# Patient Record
Sex: MALE | Race: BLACK OR AFRICAN AMERICAN | Employment: FULL TIME | URBAN - METROPOLITAN AREA
[De-identification: names, ages, dates, MRNs, and addresses within clinical notes are randomized per-mention and may not be internally consistent; named-entity substitution may affect disease eponyms.]

---

## 2015-03-31 LAB — HBA1C MFR BLD: 8.1 % (ref 0–5.7)

## 2015-11-24 LAB
CHOLEST SERPL-MCNC: 213 MG/DL
CREAT SERPL-MCNC: 1.12 MG/DL (ref 0.7–1.3)
GFR SERPL CREATININE-BSD FRML MDRD: >60 ML/MIN/1.73M2
GLUCOSE SERPL-MCNC: 182 MG/DL (ref 74–106)
HBA1C MFR BLD: 8.4 % (ref 0–5.7)
HDLC SERPL-MCNC: 52 MG/DL
LDLC SERPL CALC-MCNC: 115 MG/DL
POTASSIUM SERPL-SCNC: 3.6 MMOL/L (ref 3.5–5.1)
TRIGL SERPL-MCNC: 228 MG/DL

## 2016-06-28 LAB — HBA1C MFR BLD: 8 % (ref 0–5.7)

## 2016-08-16 LAB — HBA1C MFR BLD: 7.2 % (ref 0–5.7)

## 2016-11-17 LAB
CHOLEST SERPL-MCNC: 252 MG/DL
CREAT SERPL-MCNC: 1.09 MG/DL (ref 0.7–1.3)
GFR SERPL CREATININE-BSD FRML MDRD: >60 ML/MIN/1.73M2
GLUCOSE SERPL-MCNC: 147 MG/DL (ref 74–106)
HBA1C MFR BLD: 7.2 % (ref 0–5.7)
HDLC SERPL-MCNC: 81 MG/DL
HEP C HIM: NORMAL
LDLC SERPL CALC-MCNC: 150 MG/DL
POTASSIUM SERPL-SCNC: 4 MMOL/L (ref 3.5–5.1)
TRIGL SERPL-MCNC: 107 MG/DL

## 2017-02-27 ENCOUNTER — PRE VISIT (OUTPATIENT)
Dept: UROLOGY | Facility: CLINIC | Age: 62
End: 2017-02-27

## 2017-02-27 NOTE — TELEPHONE ENCOUNTER
"Spoke with patient had patient states difficulty with ejaculation and Dr Molina \"told him\" to take sudafed and it would help him and its not working. Patient also stated that he is coming in to discuss Viagra.   "

## 2017-03-02 ENCOUNTER — OFFICE VISIT (OUTPATIENT)
Dept: UROLOGY | Facility: CLINIC | Age: 62
End: 2017-03-02

## 2017-03-02 VITALS
HEART RATE: 104 BPM | BODY MASS INDEX: 21.5 KG/M2 | HEIGHT: 67 IN | DIASTOLIC BLOOD PRESSURE: 76 MMHG | SYSTOLIC BLOOD PRESSURE: 111 MMHG | WEIGHT: 137 LBS

## 2017-03-02 DIAGNOSIS — N52.9 ERECTILE DYSFUNCTION, UNSPECIFIED ERECTILE DYSFUNCTION TYPE: Primary | ICD-10-CM

## 2017-03-02 DIAGNOSIS — R68.82 DECREASED LIBIDO: ICD-10-CM

## 2017-03-02 DIAGNOSIS — C61 PC (PROSTATE CANCER) (H): ICD-10-CM

## 2017-03-02 RX ORDER — SILDENAFIL 100 MG/1
100 TABLET, FILM COATED ORAL DAILY PRN
Qty: 12 TABLET | Refills: 11 | Status: SHIPPED | OUTPATIENT
Start: 2017-03-02 | End: 2019-01-07

## 2017-03-02 RX ORDER — SILDENAFIL CITRATE 20 MG/1
20-40 TABLET ORAL DAILY PRN
Qty: 30 TABLET | Refills: 12 | Status: SHIPPED | OUTPATIENT
Start: 2017-03-02 | End: 2018-05-07

## 2017-03-02 ASSESSMENT — PAIN SCALES - GENERAL: PAINLEVEL: NO PAIN (0)

## 2017-03-02 NOTE — MR AVS SNAPSHOT
After Visit Summary   3/2/2017    August Ray    MRN: [id number]           Patient Information     Date Of Birth          1955        Visit Information        Provider Department      3/2/2017 10:40 AM Mao Molina MD [facility] Urology and [facility]        Today's Diagnoses     Erectile dysfunction, unspecified erectile dysfunction type    -  1    PC (prostate cancer) (H)        Decreased libido          Care Instructions    Follow up in 1 year with Dr Molina    It was a pleasure meeting with you today.  Thank you for allowing me and my team the privilege of caring for you today.  YOU are the reason we are here, and I truly hope we provided you with the excellent service you deserve.  Please let us know if there is anything else we can do for you so that we can be sure you are leaving completely satisfied with your care experience.                Follow-ups after your visit        Follow-up notes from your care team     Return in about 1 year (around 3/2/2018).      Your next 10 appointments already scheduled     Aug 29, 2017  8:30 AM CDT   Return Visit with Lillie Worley MD   Radiation Therapy Center ([facility])    [address] [place] [address], [address]  [facility] [address]   [phone]              Who to contact     Please call your clinic at [phone] to:    Ask questions about your health    Make or cancel appointments    Discuss your medicines    Learn about your test results    Speak to your doctor   If you have compliments or concerns about an experience at your clinic, or if you wish to file a complaint, please contact [facility] Patient Relations at [phone] or email us at [email or web address]         Additional Information About Your Visit        MyChart Information     Upplication is an electronic gateway that provides easy, online access to your medical records. With Upplication, you can request a  "clinic appointment, read your test results, renew a prescription or communicate with your care team.     To sign up for DEMANDITt visit the website at [email or web address]   You will be asked to enter the access code listed below, as well as some personal information. Please follow the directions to create your username and password.     Your access code is: [email or web address]  Expires: 2017  6:31 AM     Your access code will  in 90 days. If you need help or a new code, please contact your [facility] or call [phone] for assistance.        Care EveryWhere ID     This is your Care EveryWhere ID. This could be used by other organizations to access your [place] medical records  [id number]        Your Vitals Were     Pulse Height BMI (Body Mass Index)             104 1.702 m (5' 7\") 21.46 kg/m2          Blood Pressure from Last 3 Encounters:   17 111/76   10/06/16 99/67   16 109/60    Weight from Last 3 Encounters:   17 62.1 kg (137 lb)   10/06/16 62.1 kg (137 lb)   16 59.9 kg (132 lb)              Today, you had the following     No orders found for display         Today's Medication Changes          These changes are accurate as of: 3/2/17  1:35 PM.  If you have any questions, ask your nurse or doctor.               Start taking these medicines.        Dose/Directions    sildenafil 20 MG tablet   Commonly known as:  REVATIO/VIAGRA   Used for:  Erectile dysfunction, unspecified erectile dysfunction type   Started by:  Mao Molina MD        Dose:  20-40 mg   Take 1-2 tablets (20-40 mg) by mouth daily as needed May increase up to 100mg dose ( 5 tablets) if needed.  Use lowest effective dose.   Quantity:  30 tablet   Refills:  12         These medicines have changed or have updated prescriptions.        Dose/Directions    * VIAGRA PO   This may have changed:  Another medication with the same name was added. Make sure you understand how and " when to take each.   Changed by:  Lillie Worley MD        Take  by mouth as needed.   Refills:  0       * sildenafil 100 MG cap/tab   Commonly known as:  VIAGRA   This may have changed:  You were already taking a medication with the same name, and this prescription was added. Make sure you understand how and when to take each.   Used for:  Erectile dysfunction, unspecified erectile dysfunction type   Changed by:  Mao Molina MD        Dose:  100 mg   Take 1 tablet (100 mg) by mouth daily as needed for erectile dysfunction   Quantity:  12 tablet   Refills:  11       * Notice:  This list has 2 medication(s) that are the same as other medications prescribed for you. Read the directions carefully, and ask your doctor or other care provider to review them with you.         Where to get your medicines      Some of these will need a paper prescription and others can be bought over the counter.  Ask your nurse if you have questions.     Bring a paper prescription for each of these medications     sildenafil 100 MG cap/tab    sildenafil 20 MG tablet                Primary Care Provider Office Phone # Fax #    Jackson Gorman [phone]       [facility] [address] [address]        Thank you!     Thank you for choosing [facility] UROLOGY AND [facility] FOR PROSTATE AND UROLOGIC CANCERS  for your care. Our goal is always to provide you with excellent care. Hearing back from our patients is one way we can continue to improve our services. Please take a few minutes to complete the written survey that you may receive in the mail after your visit with us. Thank you!             Your Updated Medication List - Protect others around you: Learn how to safely use, store and throw away your medicines at www.disposemymeds.org.          This list is accurate as of: 3/2/17  1:35 PM.  Always use your most recent med list.                   Brand Name Dispense Instructions for use     acetaminophen-codeine 300-30 MG per tablet    TYLENOL #3     Reported on 3/2/2017       Adjustable Lancing Device Misc          Alcohol Prep 70 % Pads          allopurinol 150 MG Tabs half-tab    ZYLOPRIM     Take 150 mg by mouth daily.       amLODIPine 5 MG tablet    NORVASC         amoxicillin 875 MG tablet    AMOXIL     TK 1 T PO  BID TAT       aspirin 81 MG tablet      Take 1 tablet by mouth daily.       ASSURE COMFORT LANCETS 30G Misc          atorvastatin 40 MG tablet    LIPITOR     TK 1 T PO QD       azithromycin 250 MG tablet    ZITHROMAX         CARESENS CONTROL A Soln          CARESENS N GLUCOSE SYSTEM Gris          CARESENS N GLUCOSE TEST test strip   Generic drug:  blood glucose monitoring          ciprofloxacin 500 MG tablet    CIPRO     TK 1 T PO BID       CLARITIN PO      Take  by mouth as needed.       glipiZIDE 5 MG 24 hr tablet    GLUCOTROL XL     TK 1 T PO D IN THE MORNING WITH TIERNEY       glyBURIDE 5 MG tablet    DIABETA /MICRONASE     Take 5 mg by mouth 2 times daily.       HYDROcodone-acetaminophen 5-325 MG per tablet    NORCO     Reported on 3/2/2017       IBUPROFEN PO      Take  by mouth as needed.       indomethacin 50 MG capsule    INDOCIN         JARDIANCE 10 MG Tabs tablet   Generic drug:  empagliflozin      TK 1 T PO D       lisinopril-hydrochlorothiazide 20-25 MG per tablet    PRINZIDE/ZESTORETIC     Take 1 tablet by mouth daily.       metFORMIN 1000 MG tablet    GLUCOPHAGE     Take 1,000 mg by mouth 2 times daily (with meals) PT has been changed to a different medication - cannot remember name -       naproxen 500 MG tablet    NAPROSYN     TK 1 T PO BID       NIASPAN 500 MG CR tablet   Generic drug:  niacin      Take 1 tablet by mouth daily.       oxybutynin 10 MG 24 hr tablet    DITROPAN-XL     TK 1 T PO QD       penicillin V potassium 500 MG tablet    VEETID     TK 1 T PO QID       pravastatin 40 MG tablet    PRAVACHOL     TK ONE T PO D       * QC MENS DAILY MULTIVITAMIN PO      Take 1  tablet by mouth daily.       * ROGERS MULTI MEN PO      Take 1 tablet by mouth daily.       sildenafil 20 MG tablet    REVATIO/VIAGRA    30 tablet    Take 1-2 tablets (20-40 mg) by mouth daily as needed May increase up to 100mg dose ( 5 tablets) if needed.  Use lowest effective dose.       SIMVASTATIN PO          tamsulosin 0.4 MG capsule    FLOMAX     Take 1 capsule by mouth daily.       * testosterone 12.5 MG/ACT (1%) gel    ANDROGEL 1% PUMP    1 Package    Place 400 pumps (5,000 mg) onto the skin every morning       * testosterone 40.5 MG/2.5GM (1.62%) Gel    ANDROGEL    3 Month    Place 1 packet (40.5 mg) onto the skin daily       TYLENOL PO      Take  by mouth as needed.       * VIAGRA PO      Take  by mouth as needed.       * sildenafil 100 MG cap/tab    VIAGRA    12 tablet    Take 1 tablet (100 mg) by mouth daily as needed for erectile dysfunction       * Notice:  This list has 6 medication(s) that are the same as other medications prescribed for you. Read the directions carefully, and ask your doctor or other care provider to review them with you.

## 2017-03-02 NOTE — LETTER
"3/2/2017       RE: August Ray  [address] [place] [address]     Dear Colleague,    Thank you for referring your patient, August Ray, to the [facility] UROLOGY AND INST FOR PROSTATE AND UROLOGIC CANCERS at [facility]. Please see a copy of my visit note below.    Urology Clinic Note:     Mr. Ray is a 61 year old male with urologic history of prostate cancer Rose 6= 3+3 s/p  brachytherapy in 2007 who presents today for follow up for low libido. Patient has previously been on Androgel in 2013 which has since been discontinued. His most recent PSA 0.2. He states that his libido comes and goes but he does report good erections. He has tried Cialis in the past but without much improvement.     He reports that sometimes he can have normal intercourse without Viagra.  Medications do help, though.  He struggles with cost.      He complains of low libido and lack of ejaculation.  Testosterone was normal 10/2016.    /76 (BP Location: Right arm, Cuff Size: Adult Large)  Pulse 104  Ht 1.702 m (5' 7\")  Wt 62.1 kg (137 lb)  BMI 21.46 kg/m2   No acute distress   Non-labored breathing on RA     Labs:  Testosterone 378 10/2016   PSA 0.02 8/30/16    Lab Results   Component Value Date    PSA 0.02 08/30/2016    PSA 0.04 09/21/2015    PSA 0.06 08/14/2015    PSA 0.05 03/09/2015    PSA  03/06/2014     <0.07  PSA results are about 7% lower than our prior method due to a methodology change   on August 30, 2011.    PSA  08/23/2013     <0.07  PSA results are about 7% lower than our prior method due to a methodology change   on August 30, 2011.    PSA 0.09 04/26/2013    PSA 0.10 02/04/2013    PSA 0.07 07/16/2012    PSA 0.15 09/06/2011        A-  Prostate cancer  Decreased libido with normal testosterone.  Anejaculation    Plan:     - Will prescribe Viagra 50-100mg.  Discussed side effects and how to take.  - Sudafed could be tried but probably won't work for antegrade " ejaculation.  - prefer not do testosterone replacement therapy due to history of prostate cancer and normal testosterone level.  - return to clinic 1 year, sooner if needed.    15min visit, over 50% face to face in counseling/discussion of ED, CAP, T level.    Mao Molina MD  Urology Staff

## 2017-03-02 NOTE — PATIENT INSTRUCTIONS
Follow up in 1 year with Dr Molina    It was a pleasure meeting with you today.  Thank you for allowing me and my team the privilege of caring for you today.  YOU are the reason we are here, and I truly hope we provided you with the excellent service you deserve.  Please let us know if there is anything else we can do for you so that we can be sure you are leaving completely satisfied with your care experience.

## 2017-03-02 NOTE — PROGRESS NOTES
"Urology Clinic Note:     Mr. Ray is a 61 year old male with urologic history of prostate cancer Rose 6= 3+3 s/p  brachytherapy in 2007 who presents today for follow up for low libido. Patient has previously been on Androgel in 2013 which has since been discontinued. His most recent PSA 0.2. He states that his libido comes and goes but he does report good erections. He has tried Cialis in the past but without much improvement.     He reports that sometimes he can have normal intercourse without Viagra.  Medications do help, though.  He struggles with cost.      He complains of low libido and lack of ejaculation.  Testosterone was normal 10/2016.    /76 (BP Location: Right arm, Cuff Size: Adult Large)  Pulse 104  Ht 1.702 m (5' 7\")  Wt 62.1 kg (137 lb)  BMI 21.46 kg/m2   No acute distress   Non-labored breathing on RA     Labs:  Testosterone 378 10/2016   PSA 0.02 8/30/16    Lab Results   Component Value Date    PSA 0.02 08/30/2016    PSA 0.04 09/21/2015    PSA 0.06 08/14/2015    PSA 0.05 03/09/2015    PSA  03/06/2014     <0.07  PSA results are about 7% lower than our prior method due to a methodology change   on August 30, 2011.    PSA  08/23/2013     <0.07  PSA results are about 7% lower than our prior method due to a methodology change   on August 30, 2011.    PSA 0.09 04/26/2013    PSA 0.10 02/04/2013    PSA 0.07 07/16/2012    PSA 0.15 09/06/2011        A-  Prostate cancer  Decreased libido with normal testosterone.  Anejaculation    Plan:     - Will prescribe Viagra 50-100mg.  Discussed side effects and how to take.  - Sudafed could be tried but probably won't work for antegrade ejaculation.  - prefer not do testosterone replacement therapy due to history of prostate cancer and normal testosterone level.  - return to clinic 1 year, sooner if needed.    15min visit, over 50% face to face in counseling/discussion of ED, CAP, T level.    Mao Molina MD  Urology Staff   "

## 2017-06-06 LAB
CREAT SERPL-MCNC: 1.08 MG/DL (ref 0.7–1.3)
GFR SERPL CREATININE-BSD FRML MDRD: >60 ML/MIN/1.73M2
GLUCOSE SERPL-MCNC: 228 MG/DL (ref 74–106)
HBA1C MFR BLD: 8.7 % (ref 0–5.7)
POTASSIUM SERPL-SCNC: 4.1 MMOL/L (ref 3.5–5.1)

## 2017-08-17 ENCOUNTER — TRANSFERRED RECORDS (OUTPATIENT)
Dept: HEALTH INFORMATION MANAGEMENT | Facility: CLINIC | Age: 62
End: 2017-08-17

## 2017-10-17 ENCOUNTER — OFFICE VISIT (OUTPATIENT)
Dept: RADIATION THERAPY | Facility: OUTPATIENT CENTER | Age: 62
End: 2017-10-17

## 2017-10-17 VITALS
BODY MASS INDEX: 22.02 KG/M2 | SYSTOLIC BLOOD PRESSURE: 130 MMHG | DIASTOLIC BLOOD PRESSURE: 80 MMHG | HEART RATE: 88 BPM | OXYGEN SATURATION: 99 % | WEIGHT: 140.6 LBS

## 2017-10-17 DIAGNOSIS — C61 MALIGNANT NEOPLASM OF PROSTATE (H): ICD-10-CM

## 2017-10-17 DIAGNOSIS — C61 MALIGNANT NEOPLASM OF PROSTATE (H): Primary | ICD-10-CM

## 2017-10-17 LAB — PSA SERPL-MCNC: 0.03 UG/L (ref 0–4)

## 2017-10-17 PROCEDURE — 36415 COLL VENOUS BLD VENIPUNCTURE: CPT | Performed by: RADIOLOGY

## 2017-10-17 PROCEDURE — 84153 ASSAY OF PSA TOTAL: CPT | Performed by: RADIOLOGY

## 2017-10-17 ASSESSMENT — PAIN SCALES - GENERAL: PAINLEVEL: NO PAIN (0)

## 2017-10-17 NOTE — LETTER
10/17/2017      RE: August Ray  [address]  [place] [address]       RADIATION ONCOLOGY FOLLOW UP  October 17, 2017    DISEASE TREATED:   cT1c N0 M0, [place] 3+3 = 6 adenocarcinoma of the prostate with a pre-treatment PSA of 5.6    RADIATION THERAPY DELIVERED:   Low-dose rate brachytherapy: 144 Gy via I-125 seeds, implanted 9/28/2007    INTERVAL SINCE COMPLETION OF RADIATION THERAPY: 10 years    SUBJECTIVE:   August Ray is a 61 year old male with a PMH significant for a low-risk prostate cancer s/p definitive radiotherapy with low-dose rate prostate brachytherapy. He received a total dose of 144 Gy via I-125 seeds which were implanted on 9/28/2007. Mr. Ray has been doing very well since implantation, with a progressively declining PSA and no significant late-term radiation-induced toxicities.    On exam today, Mr. Ray is doing very well. He continues to have some issues with urinary frequency with AUA today of 16/35. This is largely unchanged over the past year. He continues to take Flomax daily. . He does not currently have any dysuria or hematuria. His sexual function remains good with DEMAR of 22/25. However, he does not have much sexual desire. He has been using Viagra with some help. His bowel movements have been regular without any diarrhea or constipation. He otherwise feels well without any significant issues     PHYSICAL EXAM:  /80 (Cuff Size: Adult Regular)  Pulse 88  Wt 63.8 kg (140 lb 9.6 oz)  SpO2 99%  BMI 22.02 kg/m2  Gen: Alert, in NAD  Pulm: CTAB, no wheezing, stridor or respiratory distress  CV: Frequent PVCs, no m/r/g, well-perfused, no cyanosis, no pedal edema  Rectal: Deferred  Musculoskeletal: Normal bulk and tone   Skin: Normal color and turgor    LABS AND IMAGING:    PSA   Date Value Ref Range Status   10/17/2017 0.03 0 - 4 ug/L Final     Comment:     Assay Method:  Chemiluminescence using Siemens Vista analyzer   08/30/2016 0.02 0 - 4 ug/L Final      Comment:     Assay Method:  Chemiluminescence using Siemens Vista analyzer   09/21/2015 0.04 0 - 4 ug/L Final   08/14/2015 0.06 0 - 4 ug/L Final   03/09/2015 0.05 0 - 4 ug/L Final   03/06/2014  0 - 4 ug/L Final    <0.07  PSA results are about 7% lower than our prior method due to a methodology change   on August 30, 2011.   08/23/2013  0 - 4 ug/L Final    <0.07  PSA results are about 7% lower than our prior method due to a methodology change   on August 30, 2011.   04/26/2013 0.09 0 - 4 ug/L Final   02/04/2013 0.10 0 - 4 ug/L Final   07/16/2012 0.07 0 - 4 ug/L Final     Comment:     PSA results are about 7% lower than our prior method due to a methodology   change   on August 30, 2011.   09/06/2011 0.15 0 - 4 ug/L Final     Comment:     PSA results are about 7% lower than our prior method due to a methodology   change   on August 30, 2011. To repeat testing by prior method contact the laboratory.   02/11/2011 0.16 0 - 4 ug/L Final   02/15/2010 0.25 0 - 4 ug/L Final   07/06/2009 0.99 0 - 4 ug/L Final   02/16/2009 0.71 0 - 4 ug/L Final   09/29/2008 0.68 0 - 4 ug/L Final   05/12/2008 0.88 0 - 4 ug/L Final   02/11/2008 1.08 0 - 4 ug/L Final        IMPRESSION:   Mr. Ray is a 61 year old male with a low-risk prostate cancer s/p definitive radiotherapy with I-125 low dose rate brachytherapy implantation in 9/2007. He is currently 10 years out from the completion of treatment with no biochemical evidence concerning for recurrent/relapsed disease.     PLAN:   1. Follow-up in radiation oncology clinic ([facility] or [facility])  in 1 year with a repeat PSA  2.   Continue regular follow up with PCP for health maintenance       Lillie Worley M.D., Ph.D.      Radiation Oncologist   [facility]   Radiation Therapy Center   Phone: [phone]    CC  Patient Care Team:  Jackson Gorman as PCP - General  Mao Molina MD as MD (Urology)

## 2017-10-17 NOTE — PROGRESS NOTES
RADIATION ONCOLOGY FOLLOW UP  October 17, 2017    DISEASE TREATED:   cT1c N0 M0, Rose 3+3 = 6 adenocarcinoma of the prostate with a pre-treatment PSA of 5.6    RADIATION THERAPY DELIVERED:   Low-dose rate brachytherapy: 144 Gy via I-125 seeds, implanted 9/28/2007    INTERVAL SINCE COMPLETION OF RADIATION THERAPY: 10 years    SUBJECTIVE:   August Ray is a 61 year old male with a PMH significant for a low-risk prostate cancer s/p definitive radiotherapy with low-dose rate prostate brachytherapy. He received a total dose of 144 Gy via I-125 seeds which were implanted on 9/28/2007. Mr. Ray has been doing very well since implantation, with a progressively declining PSA and no significant late-term radiation-induced toxicities.    On exam today, Mr. Ray is doing very well. He continues to have some issues with urinary frequency with AUA today of 16/35. This is largely unchanged over the past year. He continues to take Flomax daily. . He does not currently have any dysuria or hematuria. His sexual function remains good with DEMAR of 22/25. However, he does not have much sexual desire. He has been using Viagra with some help. His bowel movements have been regular without any diarrhea or constipation. He otherwise feels well without any significant issues     PHYSICAL EXAM:  /80 (Cuff Size: Adult Regular)  Pulse 88  Wt 63.8 kg (140 lb 9.6 oz)  SpO2 99%  BMI 22.02 kg/m2  Gen: Alert, in NAD  Pulm: CTAB, no wheezing, stridor or respiratory distress  CV: Frequent PVCs, no m/r/g, well-perfused, no cyanosis, no pedal edema  Rectal: Deferred  Musculoskeletal: Normal bulk and tone   Skin: Normal color and turgor    LABS AND IMAGING:    PSA   Date Value Ref Range Status   10/17/2017 0.03 0 - 4 ug/L Final     Comment:     Assay Method:  Chemiluminescence using Siemens Vista analyzer   08/30/2016 0.02 0 - 4 ug/L Final     Comment:     Assay Method:  Chemiluminescence using Siemens Vista analyzer   09/21/2015  0.04 0 - 4 ug/L Final   08/14/2015 0.06 0 - 4 ug/L Final   03/09/2015 0.05 0 - 4 ug/L Final   03/06/2014  0 - 4 ug/L Final    <0.07  PSA results are about 7% lower than our prior method due to a methodology change   on August 30, 2011.   08/23/2013  0 - 4 ug/L Final    <0.07  PSA results are about 7% lower than our prior method due to a methodology change   on August 30, 2011.   04/26/2013 0.09 0 - 4 ug/L Final   02/04/2013 0.10 0 - 4 ug/L Final   07/16/2012 0.07 0 - 4 ug/L Final     Comment:     PSA results are about 7% lower than our prior method due to a methodology   change   on August 30, 2011.   09/06/2011 0.15 0 - 4 ug/L Final     Comment:     PSA results are about 7% lower than our prior method due to a methodology   change   on August 30, 2011. To repeat testing by prior method contact the laboratory.   02/11/2011 0.16 0 - 4 ug/L Final   02/15/2010 0.25 0 - 4 ug/L Final   07/06/2009 0.99 0 - 4 ug/L Final   02/16/2009 0.71 0 - 4 ug/L Final   09/29/2008 0.68 0 - 4 ug/L Final   05/12/2008 0.88 0 - 4 ug/L Final   02/11/2008 1.08 0 - 4 ug/L Final        IMPRESSION:   Mr. Ray is a 61 year old male with a low-risk prostate cancer s/p definitive radiotherapy with I-125 low dose rate brachytherapy implantation in 9/2007. He is currently 10 years out from the completion of treatment with no biochemical evidence concerning for recurrent/relapsed disease.     PLAN:   1. Follow-up in radiation oncology clinic (here or [facility])  in 1 year with a repeat PSA  2.   Continue regular follow up with PCP for health maintenance       Lillie Rodrigez M.D., Ph.D.      Radiation Oncologist   [facility]   Radiation Therapy Center   Phone: [phone]    CC  Patient Care Team:  Jackson Gorman as PCP - General  Lillie Rodrigez MD as Referring Physician (Radiation Oncology)  Mao Molina MD as MD (Urology)  Jackson Gorman as PCP (Physician Assistant)  LILLIE RODRIGEZ

## 2017-10-17 NOTE — MR AVS SNAPSHOT
After Visit Summary   10/17/2017    August Ray    MRN: [id number]           Patient Information     Date Of Birth          1955        Visit Information        Provider Department      10/17/2017 8:30 AM Lillie Worley MD [facility]         Follow-ups after your visit        Who to contact     Please call your clinic at [phone] to:    Ask questions about your health    Make or cancel appointments    Discuss your medicines    Learn about your test results    Speak to your doctor   If you have compliments or concerns about an experience at your clinic, or if you wish to file a complaint, please contact [facility] Patient Relations at [phone] or email us at [email or web address]         Additional Information About Your Visit        Care EveryWhere ID     This is your Care EveryWhere ID. This could be used by other organizations to access your [place] medical records  [id number]        Your Vitals Were     Pulse Pulse Oximetry BMI (Body Mass Index)             88 99% 22.02 kg/m2          Blood Pressure from Last 3 Encounters:   10/17/17 130/80   03/02/17 111/76   10/06/16 99/67    Weight from Last 3 Encounters:   10/17/17 63.8 kg (140 lb 9.6 oz)   03/02/17 62.1 kg (137 lb)   10/06/16 62.1 kg (137 lb)              Today, you had the following     No orders found for display         Today's Medication Changes          These changes are accurate as of: 10/17/17 11:40 AM.  If you have any questions, ask your nurse or doctor.               These medicines have changed or have updated prescriptions.        Dose/Directions    ROGERS MULTI MEN PO   This may have changed:  Another medication with the same name was removed. Continue taking this medication, and follow the directions you see here.   Changed by:  Josh Baeza MD        Dose:  1 tablet   Take 1 tablet by mouth daily.   Refills:  0       sildenafil 100 MG tablet   Commonly  known as:  VIAGRA   This may have changed:  Another medication with the same name was removed. Continue taking this medication, and follow the directions you see here.   Used for:  Erectile dysfunction, unspecified erectile dysfunction type   Changed by:  Mao Molina MD        Dose:  100 mg   Take 1 tablet (100 mg) by mouth daily as needed for erectile dysfunction   Quantity:  12 tablet   Refills:  11         Stop taking these medicines if you haven't already. Please contact your care team if you have questions.     acetaminophen-codeine 300-30 MG per tablet   Commonly known as:  TYLENOL #3   Stopped by:  Lillie Worley MD           Alcohol Prep 70 % Pads   Stopped by:  Lillie Worley MD           allopurinol 150 mg Tabs half-tab   Commonly known as:  ZYLOPRIM   Stopped by:  Lillie Worley MD           amLODIPine 5 MG tablet   Commonly known as:  NORVASC   Stopped by:  Lillie Worley MD           amoxicillin 875 MG tablet   Commonly known as:  AMOXIL   Stopped by:  Lillie Worley MD           azithromycin 250 MG tablet   Commonly known as:  ZITHROMAX   Stopped by:  Lillie Worley MD           ciprofloxacin 500 MG tablet   Commonly known as:  CIPRO   Stopped by:  Lillie Worley MD           HYDROcodone-acetaminophen 5-325 MG per tablet   Commonly known as:  NORCO   Stopped by:  Lillie Worley MD           IBUPROFEN PO   Stopped by:  Lillie Worley MD           oxybutynin 10 MG 24 hr tablet   Commonly known as:  DITROPAN-XL   Stopped by:  Lillie Worley MD           penicillin V potassium 500 MG tablet   Commonly known as:  VEETID   Stopped by:  Lillie Worley MD                    Primary Care Provider Office Phone # Fax #    Jackson Gorman [phone]       [facility] [address]  [facility] [address]        Equal Access to Services     [facility]: Hadii aad willie hadasho Soomaali, waaxda luqadaha, qaybta kaalmada ademarcell, mel little . [facility] [phone].    ATENCIÓN: Lui murphy,  tiene a mceknzie disposición servicios gratuitos de asistencia lingüística. Radhames denney [phone].    We comply with applicable federal civil rights laws and [place] laws. We do not discriminate on the basis of race, color, national origin, age, disability, sex, sexual orientation, or gender identity.            Thank you!     Thank you for choosing [facility]  for your care. Our goal is always to provide you with excellent care. Hearing back from our patients is one way we can continue to improve our services. Please take a few minutes to complete the written survey that you may receive in the mail after your visit with us. Thank you!             Your Updated Medication List - Protect others around you: Learn how to safely use, store and throw away your medicines at www.disposemymeds.org.          This list is accurate as of: 10/17/17 11:40 AM.  Always use your most recent med list.                   Brand Name Dispense Instructions for use Diagnosis    Adjustable Lancing Device Misc           aspirin 81 MG tablet      Take 1 tablet by mouth daily.        ASSURE COMFORT LANCETS 30G Misc           atorvastatin 40 MG tablet    LIPITOR     TK 1 T PO QD        CARESENS CONTROL A Soln           CARESENS N GLUCOSE SYSTEM Gris           CARESENS N GLUCOSE TEST test strip   Generic drug:  blood glucose monitoring           CLARITIN PO      Take  by mouth as needed.        glipiZIDE 5 MG 24 hr tablet    GLUCOTROL XL     TK 1 T PO D IN THE MORNING WITH TIERNEY        glyBURIDE 5 MG tablet    DIABETA /MICRONASE     Take 5 mg by mouth 2 times daily.        indomethacin 50 MG capsule    INDOCIN          JARDIANCE 10 MG Tabs tablet   Generic drug:  empagliflozin      TK 1 T PO D        lisinopril-hydrochlorothiazide 20-25 MG per tablet    PRINZIDE/ZESTORETIC     Take 1 tablet by mouth daily.        ROGERS MULTI MEN PO      Take 1 tablet by mouth daily.        metFORMIN 1000 MG tablet    GLUCOPHAGE     Take 1,000 mg by mouth  2 times daily (with meals) PT has been changed to a different medication - cannot remember name -        naproxen 500 MG tablet    NAPROSYN     TK 1 T PO BID        NIASPAN 500 MG CR tablet   Generic drug:  niacin      Take 1 tablet by mouth daily.        pravastatin 40 MG tablet    PRAVACHOL     TK ONE T PO D        sildenafil 100 MG tablet    VIAGRA    12 tablet    Take 1 tablet (100 mg) by mouth daily as needed for erectile dysfunction    Erectile dysfunction, unspecified erectile dysfunction type       sildenafil 20 MG tablet    REVATIO    30 tablet    Take 1-2 tablets (20-40 mg) by mouth daily as needed May increase up to 100mg dose ( 5 tablets) if needed.  Use lowest effective dose.    Erectile dysfunction, unspecified erectile dysfunction type       SIMVASTATIN PO           tamsulosin 0.4 MG capsule    FLOMAX     Take 1 capsule by mouth daily.        * testosterone 12.5 MG/ACT (1%) gel    ANDROGEL 1% PUMP    1 Package    Place 400 pumps (5,000 mg) onto the skin every morning    Libido, decreased, Hypogonadism, male       * testosterone 40.5 MG/2.5GM (1.62%) Gel    ANDROGEL    3 Month    Place 1 packet (40.5 mg) onto the skin daily    Hypogonadism male       TYLENOL PO      Take  by mouth as needed.        * Notice:  This list has 2 medication(s) that are the same as other medications prescribed for you. Read the directions carefully, and ask your doctor or other care provider to review them with you.

## 2017-11-24 ENCOUNTER — TRANSFERRED RECORDS (OUTPATIENT)
Dept: HEALTH INFORMATION MANAGEMENT | Facility: CLINIC | Age: 62
End: 2017-11-24

## 2017-11-24 LAB
CHOLEST SERPL-MCNC: 212 MG/DL
HBA1C MFR BLD: 8.2 % (ref 0–5.7)
HDLC SERPL-MCNC: 76 MG/DL
LDLC SERPL CALC-MCNC: 118 MG/DL
TRIGL SERPL-MCNC: 88 MG/DL

## 2017-12-13 ENCOUNTER — TRANSFERRED RECORDS (OUTPATIENT)
Dept: HEALTH INFORMATION MANAGEMENT | Facility: CLINIC | Age: 62
End: 2017-12-13

## 2018-01-23 ENCOUNTER — TELEPHONE (OUTPATIENT)
Dept: UROLOGY | Facility: CLINIC | Age: 63
End: 2018-01-23

## 2018-01-23 NOTE — TELEPHONE ENCOUNTER
"RE: follow up  Received: Today       Philip Ibanez Brian, LPN                   Per pt, he is currently switching insurance, and he would like to hold off on making a f/u appt with Dr. Molina at this time. The pt said he would call to make an future appt in need be.     Philip            Previous Messages       ----- Message -----      From: Jv Mukherjee LPN      Sent: 1/23/2018   8:22 AM        To: [facility]ordin[facility]   Subject: FW: follow up                                     Please see below; call patient to schedule next available with Dr. Molina either here or [place].     Thanks   ----- Message -----      From: Mao Molina MD      Sent: 1/23/2018   8:01 AM        To: Jv Mukherjee LPN   Subject: RE: follow up                                     Just have him see me back in clinic.   Thank You     ----- Message -----      From: Jv Mukherjee LPN      Sent: 1/22/2018   3:32 PM        To: Mao Molina MD   Subject: follow up                                         This patient called triage and it was very difficult to understand/communicate.     He says Dr. Downs told him to call you and tell you that \"he has no sexual feeling\" as a side effect from cancer 10 years ago.     He insists that Dr. Downs said after he tells you this, you will know what to do.     Repeatedly asked if he wanted to make an appt to see you; he kept reiterating, Dr. Downs said to tell you this and you would decide if/when to see him.  Couldn't get any other information from him.     I agreed to send you this message and told him our office would return his call with your response.            "

## 2018-05-07 ENCOUNTER — TELEPHONE (OUTPATIENT)
Dept: FAMILY MEDICINE | Facility: CLINIC | Age: 63
End: 2018-05-07

## 2018-05-07 ENCOUNTER — OFFICE VISIT (OUTPATIENT)
Dept: FAMILY MEDICINE | Facility: CLINIC | Age: 63
End: 2018-05-07
Payer: COMMERCIAL

## 2018-05-07 VITALS
HEART RATE: 92 BPM | DIASTOLIC BLOOD PRESSURE: 93 MMHG | WEIGHT: 136 LBS | OXYGEN SATURATION: 99 % | RESPIRATION RATE: 16 BRPM | TEMPERATURE: 98.4 F | SYSTOLIC BLOOD PRESSURE: 180 MMHG | HEIGHT: 65 IN | BODY MASS INDEX: 22.66 KG/M2

## 2018-05-07 VITALS
WEIGHT: 136 LBS | TEMPERATURE: 98.4 F | HEART RATE: 92 BPM | SYSTOLIC BLOOD PRESSURE: 146 MMHG | HEIGHT: 65 IN | DIASTOLIC BLOOD PRESSURE: 83 MMHG | OXYGEN SATURATION: 99 % | BODY MASS INDEX: 22.66 KG/M2 | RESPIRATION RATE: 16 BRPM

## 2018-05-07 DIAGNOSIS — E11.69 TYPE 2 DIABETES MELLITUS WITH OTHER SPECIFIED COMPLICATION, WITHOUT LONG-TERM CURRENT USE OF INSULIN (H): Primary | ICD-10-CM

## 2018-05-07 DIAGNOSIS — R39.9 LOWER URINARY TRACT SYMPTOMS (LUTS): ICD-10-CM

## 2018-05-07 DIAGNOSIS — C61 PC (PROSTATE CANCER) (H): ICD-10-CM

## 2018-05-07 DIAGNOSIS — M54.2 CERVICALGIA: Primary | ICD-10-CM

## 2018-05-07 DIAGNOSIS — M54.50 ACUTE BILATERAL LOW BACK PAIN WITHOUT SCIATICA: ICD-10-CM

## 2018-05-07 DIAGNOSIS — Z11.4 SCREENING FOR HUMAN IMMUNODEFICIENCY VIRUS: ICD-10-CM

## 2018-05-07 PROBLEM — I95.1 ORTHOSTATIC HYPOTENSION: Status: ACTIVE | Noted: 2017-05-22

## 2018-05-07 PROBLEM — I10 HTN (HYPERTENSION): Status: ACTIVE | Noted: 2017-05-22

## 2018-05-07 LAB
GLUCOSE BLD-MCNC: 237 MG/DL (ref 70–99)
HBA1C MFR BLD: 7.9 % (ref 0–5.6)
HIV 1+2 AB+HIV1 P24 AG SERPL QL IA: NONREACTIVE
TSH SERPL DL<=0.005 MIU/L-ACNC: 0.94 MU/L (ref 0.4–4)

## 2018-05-07 PROCEDURE — 36415 COLL VENOUS BLD VENIPUNCTURE: CPT | Performed by: PHYSICIAN ASSISTANT

## 2018-05-07 PROCEDURE — 87389 HIV-1 AG W/HIV-1&-2 AB AG IA: CPT | Performed by: PHYSICIAN ASSISTANT

## 2018-05-07 PROCEDURE — 82947 ASSAY GLUCOSE BLOOD QUANT: CPT | Performed by: PHYSICIAN ASSISTANT

## 2018-05-07 PROCEDURE — 83036 HEMOGLOBIN GLYCOSYLATED A1C: CPT | Performed by: PHYSICIAN ASSISTANT

## 2018-05-07 PROCEDURE — 99214 OFFICE O/P EST MOD 30 MIN: CPT | Performed by: PHYSICIAN ASSISTANT

## 2018-05-07 PROCEDURE — 99203 OFFICE O/P NEW LOW 30 MIN: CPT | Performed by: PHYSICIAN ASSISTANT

## 2018-05-07 PROCEDURE — 84443 ASSAY THYROID STIM HORMONE: CPT | Performed by: PHYSICIAN ASSISTANT

## 2018-05-07 RX ORDER — TIZANIDINE 2 MG/1
2 TABLET ORAL 3 TIMES DAILY PRN
Qty: 30 TABLET | Refills: 0 | Status: SHIPPED | OUTPATIENT
Start: 2018-05-07 | End: 2018-09-20

## 2018-05-07 RX ORDER — GLIPIZIDE 5 MG/1
TABLET, FILM COATED, EXTENDED RELEASE ORAL
Qty: 30 TABLET | Refills: 2 | Status: SHIPPED | OUTPATIENT
Start: 2018-05-07 | End: 2018-07-26

## 2018-05-07 RX ORDER — EMPAGLIFLOZIN 10 MG/1
TABLET, FILM COATED ORAL
Qty: 30 TABLET | Refills: 2 | Status: SHIPPED | OUTPATIENT
Start: 2018-05-07 | End: 2018-05-14

## 2018-05-07 RX ORDER — TIZANIDINE 2 MG/1
2 TABLET ORAL 3 TIMES DAILY PRN
Qty: 30 TABLET | Refills: 0 | Status: SHIPPED | OUTPATIENT
Start: 2018-05-07 | End: 2018-05-07

## 2018-05-07 RX ORDER — OXYBUTYNIN CHLORIDE 10 MG/1
10 TABLET, EXTENDED RELEASE ORAL DAILY
Qty: 90 TABLET | Refills: 0 | Status: SHIPPED | OUTPATIENT
Start: 2018-05-07 | End: 2019-04-17

## 2018-05-07 NOTE — PATIENT INSTRUCTIONS
Apply ice for 24 hours then alternate heat or ice, which ever feels better. Return to clinic or Emergency Department  for uncontrolled pain, chest pain, shortness of breath, fever, numbness, incontinence or urinary problems.    Neck/Back Pain [General]  Both neck and back pain are usually caused by injury to the muscles or ligaments of the spine. Sometimes the disks that separate each bone of the spine may cause pain by putting pressure on a nearby nerve. Back and neck pain may appear after a sudden twisting/bending force (such as in a car accident), or sometimes after a simple awkward movement. In either case, muscle spasm is often present and adds to the pain.  Acute neck and back pain usually gets better in one to two weeks. Pain related to disk disease, arthritis in the spinal joints or spinal stenosis (narrowing of the spinal canal) can become chronic and last for months or years.  Home Care:  FOR NECK PAIN: Use a comfortable pillow that supports the head and keeps the spine in a neutral position. The position of the head should not be tilted forward or backward.  FOR BACK PAIN: You may need to stay in bed the first few days. But, as soon as possible, begin sitting or walking to avoid problems with prolonged bed rest (muscle weakness, worsening back stiffness and pain, blood clots in the legs).  When in bed, try to find a position of comfort. A firm mattress is best. Try lying flat on your back with pillows under your knees. You can also try lying on your side with your knees bent up towards your chest and a pillow between your knees.  Avoid prolonged sitting. This puts more stress on the lower back than standing or walking.  During the first two days after injury, apply an ICE PACK to the painful area for 20 minutes every 2-4 hours. This will reduce swelling and pain. HEAT (hot shower, hot bath or heating pad) works well for muscle spasm. You can start with ice, then switch to heat after two days. Some  patients feel best alternating ice and heat treatments. Use the one method that feels the best to you.  You may use acetaminophen (Tylenol) or ibuprofen (Motrin, Advil) to control pain, unless another pain medicine was prescribed. [NOTE: If you have chronic liver or kidney disease or ever had a stomach ulcer or GI bleeding, talk with your doctor before using these medicines.]  Be aware of safe lifting methods and do not lift anything over 15 pounds until all the pain is gone.  Follow Up  with your physician or this facility if your symptoms do not start to improve after one week. Physical therapy or further tests may be needed.  [NOTE: If X-rays were taken, they will be reviewed by a radiologist. You will be notified of any new findings that may affect your care.]  Get Prompt Medical Attention  if any of the following occur:  Pain becomes worse or spreads into your arms or legs  Weakness, numbness or pain in one or both arms or legs  Loss of bowel or bladder control  Numbness in the groin area  Difficulty walking  Fever of 100.4 F (38 C) or higher, or as directed by your healthcare provider    [id number] [facility], [address] [place] [address], [place] [address]. All rights reserved. This information is not intended as a substitute for professional medical care. Always follow your healthcare professional's instructions.

## 2018-05-07 NOTE — TELEPHONE ENCOUNTER
Central Prior Authorization Team   Phone: [phone]    PRIOR AUTHORIZATION DENIED - epa    Medication: JARDIANCE 10 MG TABS tablet - epa denied    Denial Date:  5/7/18    Denial Rational:                     Appeal Information:
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
## 2018-05-07 NOTE — PROGRESS NOTES
MrMatthew Ray,    Your thyroid and HIV tests were normal.    Please contact the clinic if you have additional questions or if symptoms persist.  Thank you.    Sincerely,    Ibrahima Malhotra

## 2018-05-07 NOTE — LETTER
[address] [address]  Phone: [phone]    May 7, 2018        August Ray  [address]  [facility] [address]          To whom it may concern:    RE: August Ray    Patient was seen and treated today at our clinic.  Patient excused from work until until 5/14/18.    Patient may return to work without restrictions after that.          Please contact me for questions or concerns.      Sincerely,        JESSI Latif
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
## 2018-05-07 NOTE — MR AVS SNAPSHOT
After Visit Summary   5/7/2018    August Ray    MRN: [id number]           Patient Information     Date Of Birth          1955        Visit Information        Provider Department      5/7/2018 8:20 AM Ibrahima Malhotra PA [facility]        Today's Diagnoses     Cervicalgia    -  1    Acute bilateral low back pain without sciatica          Care Instructions    Apply ice for 24 hours then alternate heat or ice, which ever feels better. Return to clinic or Emergency Department  for uncontrolled pain, chest pain, shortness of breath, fever, numbness, incontinence or urinary problems.    Neck/Back Pain [General]  Both neck and back pain are usually caused by injury to the muscles or ligaments of the spine. Sometimes the disks that separate each bone of the spine may cause pain by putting pressure on a nearby nerve. Back and neck pain may appear after a sudden twisting/bending force (such as in a car accident), or sometimes after a simple awkward movement. In either case, muscle spasm is often present and adds to the pain.  Acute neck and back pain usually gets better in one to two weeks. Pain related to disk disease, arthritis in the spinal joints or spinal stenosis (narrowing of the spinal canal) can become chronic and last for months or years.  Home Care:  FOR NECK PAIN: Use a comfortable pillow that supports the head and keeps the spine in a neutral position. The position of the head should not be tilted forward or backward.  FOR BACK PAIN: You may need to stay in bed the first few days. But, as soon as possible, begin sitting or walking to avoid problems with prolonged bed rest (muscle weakness, worsening back stiffness and pain, blood clots in the legs).  When in bed, try to find a position of comfort. A firm mattress is best. Try lying flat on your back with pillows under your knees. You can also try lying on your side with your knees bent up towards your  chest and a pillow between your knees.  Avoid prolonged sitting. This puts more stress on the lower back than standing or walking.  During the first two days after injury, apply an ICE PACK to the painful area for 20 minutes every 2-4 hours. This will reduce swelling and pain. HEAT (hot shower, hot bath or heating pad) works well for muscle spasm. You can start with ice, then switch to heat after two days. Some patients feel best alternating ice and heat treatments. Use the one method that feels the best to you.  You may use acetaminophen (Tylenol) or ibuprofen (Motrin, Advil) to control pain, unless another pain medicine was prescribed. [NOTE: If you have chronic liver or kidney disease or ever had a stomach ulcer or GI bleeding, talk with your doctor before using these medicines.]  Be aware of safe lifting methods and do not lift anything over 15 pounds until all the pain is gone.  Follow Up  with your physician or this facility if your symptoms do not start to improve after one week. Physical therapy or further tests may be needed.  [NOTE: If X-rays were taken, they will be reviewed by a radiologist. You will be notified of any new findings that may affect your care.]  Get Prompt Medical Attention  if any of the following occur:  Pain becomes worse or spreads into your arms or legs  Weakness, numbness or pain in one or both arms or legs  Loss of bowel or bladder control  Numbness in the groin area  Difficulty walking  Fever of 100.4 F (38 C) or higher, or as directed by your healthcare provider    [id number] [facility], [address] [place] [address], [place] [address]. All rights reserved. This information is not intended as a substitute for professional medical care. Always follow your healthcare professional's instructions.                  Follow-ups after your visit        Additional Services     [facility] PT, HAND, AND CHIROPRACTIC REFERRAL       **This order will print in the [facility] Scheduling Office**    Physical  Therapy, Hand Therapy and Chiropractic Care are available through:    *[facility]  *[facility]  *[facility]    Call one number to schedule at any of the above locations: [phone].    Your provider has referred you to: [facility] - PT and/or Chiropractic Care determined by clinical presentation at [facility] or [facility] Initial Visit    Indication/Reason for Referral: Low Back Pain and Neck Pain  Onset of Illness: 3 days  Therapy Orders: Evaluate and Treat  Special Programs: None  Special Request: None    [place]      Additional Comments for the Therapist or Chiropractor: no    Please be aware that coverage of these services is subject to the terms and limitations of your health insurance plan.  Call member services at your health plan with any benefit or coverage questions.      Please bring the following to your appointment:    *Your personal calendar for scheduling future appointments  *Comfortable clothing                  Follow-up notes from your care team     Return if symptoms worsen or fail to improve.      Who to contact     If you have questions or need follow up information about today's clinic visit or your schedule please contact [facility] directly at [phone].  Normal or non-critical lab and imaging results will be communicated to you by MyChart, letter or phone within 4 business days after the clinic has received the results. If you do not hear from us within 7 days, please contact the clinic through [facility] or phone. If you have a critical or abnormal lab result, we will notify you by phone as soon as possible.  Submit refill requests through [employer] or call your pharmacy and they will forward the refill request to us. Please allow 3 business days for your refill to be completed.          Additional Information About Your Visit        [facility] Information     [employer] lets you send messages to your doctor,  "view your test results, renew your prescriptions, schedule appointments and more. To sign up, go to [email or web address] . Click on \"Log in\" on the left side of the screen, which will take you to the Welcome page. Then click on \"Sign up Now\" on the right side of the page.     You will be asked to enter the access code listed below, as well as some personal information. Please follow the directions to create your username and password.     Your access code is: [email or web address]  Expires: 2018  9:02 AM     Your access code will  in 90 days. If you need help or a new code, please call your [place] clinic or [phone].        Care EveryWhere ID     This is your Care EveryWhere ID. This could be used by other organizations to access your [place] medical records  [id number]        Your Vitals Were     Pulse Temperature Respirations Height Pulse Oximetry BMI (Body Mass Index)    92 98.4  F (36.9  C) (Oral) 16 5' 4.5\" (1.638 m) 99% 22.98 kg/m2       Blood Pressure from Last 3 Encounters:   18 (!) 180/93   10/17/17 130/80   17 111/76    Weight from Last 3 Encounters:   18 136 lb (61.7 kg)   10/17/17 140 lb 9.6 oz (63.8 kg)   17 137 lb (62.1 kg)              We Performed the Following     [facility] PT, HAND, AND CHIROPRACTIC REFERRAL          Today's Medication Changes          These changes are accurate as of 18  9:02 AM.  If you have any questions, ask your nurse or doctor.               Start taking these medicines.        Dose/Directions    tiZANidine 2 MG tablet   Commonly known as:  ZANAFLEX   Used for:  Cervicalgia   Started by:  Ibrahima Malhotra PA        Dose:  2 mg   Take 1 tablet (2 mg) by mouth 3 times daily as needed for muscle spasms   Quantity:  30 tablet   Refills:  0            Where to get your medicines      These medications were sent to [facility] [address] [facility] [address] [facility]  [address] [facility] " [address]     Phone:  [phone]     tiZANidine 2 MG tablet                Primary Care Provider Office Phone # Fax #    Jackson Gorman PA-C [phone]       [facility] [address]  [facility] [address]        Equal Access to Services     KIRK CAGLE : Hadii aad ku hadasho Soomaali, waaxda luqadaha, qaybta kaalmada adeegyada, waxay idiin hayaan adeeg kharash la'aan . So [facility] [phone].    ATENCIÓN: Si habla español, tiene a mckenzie disposición servicios gratuitos de asistencia lingüística. Radhames al [phone].    We comply with applicable federal civil rights laws and [place] laws. We do not discriminate on the basis of race, color, national origin, age, disability, sex, sexual orientation, or gender identity.            Thank you!     Thank you for choosing [facility]  for your care. Our goal is always to provide you with excellent care. Hearing back from our patients is one way we can continue to improve our services. Please take a few minutes to complete the written survey that you may receive in the mail after your visit with us. Thank you!             Your Updated Medication List - Protect others around you: Learn how to safely use, store and throw away your medicines at www.disposemymeds.org.          This list is accurate as of 5/7/18  9:02 AM.  Always use your most recent med list.                   Brand Name Dispense Instructions for use Diagnosis    Adjustable Lancing Device Misc           aspirin 81 MG tablet      Take 1 tablet by mouth daily.        ASSURE COMFORT LANCETS 30G Misc           atorvastatin 40 MG tablet    LIPITOR     TK 1 T PO QD        CARESENS CONTROL A Soln           CARESENS N GLUCOSE SYSTEM Gris           CARESENS N GLUCOSE TEST test strip   Generic drug:  blood glucose monitoring           CLARITIN PO      Take  by mouth as needed.        glipiZIDE 5 MG 24 hr tablet    GLUCOTROL XL     TK 1 T PO D IN THE MORNING WITH TIERNEY         glyBURIDE 5 MG tablet    DIABETA /MICRONASE     Take 5 mg by mouth 2 times daily.        indomethacin 50 MG capsule    INDOCIN          JARDIANCE 10 MG Tabs tablet   Generic drug:  empagliflozin      TK 1 T PO D        lisinopril-hydrochlorothiazide 20-25 MG per tablet    PRINZIDE/ZESTORETIC     Take 1 tablet by mouth daily.        ROGERS MULTI MEN PO      Take 1 tablet by mouth daily.        metFORMIN 1000 MG tablet    GLUCOPHAGE     Take 1,000 mg by mouth 2 times daily (with meals) PT has been changed to a different medication - cannot remember name -        naproxen 500 MG tablet    NAPROSYN     TK 1 T PO BID        NIASPAN 500 MG CR tablet   Generic drug:  niacin      Take 1 tablet by mouth daily.        pravastatin 40 MG tablet    PRAVACHOL     TK ONE T PO D        sildenafil 100 MG tablet    VIAGRA    12 tablet    Take 1 tablet (100 mg) by mouth daily as needed for erectile dysfunction    Erectile dysfunction, unspecified erectile dysfunction type       sildenafil 20 MG tablet    REVATIO    30 tablet    Take 1-2 tablets (20-40 mg) by mouth daily as needed May increase up to 100mg dose ( 5 tablets) if needed.  Use lowest effective dose.    Erectile dysfunction, unspecified erectile dysfunction type       SIMVASTATIN PO           tamsulosin 0.4 MG capsule    FLOMAX     Take 1 capsule by mouth daily.        * testosterone 12.5 MG/ACT (1%) gel    ANDROGEL 1% PUMP    1 Package    Place 400 pumps (5,000 mg) onto the skin every morning    Libido, decreased, Hypogonadism, male       * testosterone 40.5 MG/2.5GM (1.62%) Gel    ANDROGEL    3 Month    Place 1 packet (40.5 mg) onto the skin daily    Hypogonadism male       tiZANidine 2 MG tablet    ZANAFLEX    30 tablet    Take 1 tablet (2 mg) by mouth 3 times daily as needed for muscle spasms    Cervicalgia       TYLENOL PO      Take  by mouth as needed.        * Notice:  This list has 2 medication(s) that are the same as other medications prescribed for you. Read the  directions carefully, and ask your doctor or other care provider to review them with you.

## 2018-05-07 NOTE — LETTER
May 7, 2018      August aRy  [address]  [place] [address]        Dear August NUNU oCry    Your thyroid and HIV tests were normal.     Please contact the clinic if you have additional questions or if symptoms persist.  Thank you.      Enclosed is a copy of the results.  It was a pleasure to see you at your last appointment.    If you have any questions or concerns, please call myself or my nurse at [phone].      Sincerely,      JESSI Segundo/ROSSANA Jay MA      Results for orders placed or performed in visit on 05/07/18   Glucose whole blood   Result Value Ref Range    Glucose Whole Blood 237 (H) 70 - 99 mg/dL   Hemoglobin A1c   Result Value Ref Range    Hemoglobin A1C 7.9 (H) 0 - 5.6 %   TSH with free T4 reflex   Result Value Ref Range    TSH 0.94 0.40 - 4.00 mU/L   HIV Antigen Antibody Combo   Result Value Ref Range    HIV Antigen Antibody Combo Nonreactive NR^Nonreactive

## 2018-05-07 NOTE — Clinical Note
Please abstract the following data from this visit with this patient into the appropriate field in Epic:  Colonoscopy done on this date: 7/3/2012 (approximately), by this group: [facility], ( in care everywhere), results were normal.

## 2018-05-07 NOTE — MR AVS SNAPSHOT
"              After Visit Summary   5/7/2018    August Ray    MRN: [id number]           Patient Information     Date Of Birth          1955        Visit Information        Provider Department      5/7/2018 9:40 AM Ibrahima Malhotra PA [facility]        Today's Diagnoses     Type 2 diabetes mellitus with other specified complication, without long-term current use of insulin (H)    -  1    PC (prostate cancer) (H)        Screening for human immunodeficiency virus        Lower urinary tract symptoms (LUTS)          Care Instructions    BRING YOUR PILL BOTTLE WITH YOU TO YOUR NEXT APPT              Follow-ups after your visit        Follow-up notes from your care team     Return in about 3 months (around 8/7/2018), or if symptoms worsen or fail to improve.      Who to contact     If you have questions or need follow up information about today's clinic visit or your schedule please contact [facility] directly at [phone].  Normal or non-critical lab and imaging results will be communicated to you by MyChart, letter or phone within 4 business days after the clinic has received the results. If you do not hear from us within 7 days, please contact the clinic through [employer] or phone. If you have a critical or abnormal lab result, we will notify you by phone as soon as possible.  Submit refill requests through [facility] or call your pharmacy and they will forward the refill request to us. Please allow 3 business days for your refill to be completed.          Additional Information About Your Visit        MyChart Information     [facility] lets you send messages to your doctor, view your test results, renew your prescriptions, schedule appointments and more. To sign up, go to [email or web address] . Click on \"Log in\" on the left side of the screen, which will take you to the Welcome page. Then click on \"Sign up Now\" on the right side of the page.     You will " "be asked to enter the access code listed below, as well as some personal information. Please follow the directions to create your username and password.     Your access code is: [email or web address]  Expires: 2018  9:02 AM     Your access code will  in 90 days. If you need help or a new code, please call your [place] clinic or [phone].        Care EveryWhere ID     This is your Care EveryWhere ID. This could be used by other organizations to access your [place] medical records  [id number]        Your Vitals Were     Pulse Temperature Respirations Height Pulse Oximetry BMI (Body Mass Index)    92 98.4  F (36.9  C) (Oral) 16 5' 4.5\" (1.638 m) 99% 22.98 kg/m2       Blood Pressure from Last 3 Encounters:   18 146/83   18 (!) 180/93   10/17/17 130/80    Weight from Last 3 Encounters:   18 136 lb (61.7 kg)   18 136 lb (61.7 kg)   10/17/17 140 lb 9.6 oz (63.8 kg)              We Performed the Following     Glucose whole blood     Hemoglobin A1c     HIV Antigen Antibody Combo     TSH with free T4 reflex          Today's Medication Changes          These changes are accurate as of 18 10:10 AM.  If you have any questions, ask your nurse or doctor.               Start taking these medicines.        Dose/Directions    oxybutynin 10 MG 24 hr tablet   Commonly known as:  DITROPAN XL   Used for:  Lower urinary tract symptoms (LUTS)   Started by:  Ibrahima Malhotra PA        Dose:  10 mg   Take 1 tablet (10 mg) by mouth daily   Quantity:  90 tablet   Refills:  0       tiZANidine 2 MG tablet   Commonly known as:  ZANAFLEX   Used for:  Cervicalgia   Started by:  Ibrahima Malhotra PA        Dose:  2 mg   Take 1 tablet (2 mg) by mouth 3 times daily as needed for muscle spasms   Quantity:  30 tablet   Refills:  0         These medicines have changed or have updated prescriptions.        Dose/Directions    glipiZIDE 5 MG 24 hr tablet   Commonly known as:  GLUCOTROL XL   This may " have changed:  See the new instructions.   Used for:  Type 2 diabetes mellitus with other specified complication, without long-term current use of insulin (H)   Changed by:  Ibrahima Malhotra PA        TK 1 T PO D IN THE MORNING WITH BREAKFAST   Quantity:  30 tablet   Refills:  2       JARDIANCE 10 MG Tabs tablet   This may have changed:  See the new instructions.   Used for:  Type 2 diabetes mellitus with other specified complication, without long-term current use of insulin (H)   Generic drug:  empagliflozin   Changed by:  Ibrahima Malhotra PA        Take 1 tab PO daily   Quantity:  30 tablet   Refills:  2         Stop taking these medicines if you haven't already. Please contact your care team if you have questions.     atorvastatin 40 MG tablet   Commonly known as:  LIPITOR   Stopped by:  Ibrahima Malhotra PA           glyBURIDE 5 MG tablet   Commonly known as:  DIABETA /MICRONASE   Stopped by:  Ibrahima Malhotra PA           naproxen 500 MG tablet   Commonly known as:  NAPROSYN   Stopped by:  Ibrahima Malhotra PA           sildenafil 20 MG tablet   Commonly known as:  REVATIO   Stopped by:  Ibrahima Malhotra PA           SIMVASTATIN PO   Stopped by:  Ibrahima Malhotra PA           testosterone 12.5 MG/ACT (1%) gel   Commonly known as:  ANDROGEL 1% PUMP   Stopped by:  Ibrahima Malhotra PA           testosterone 40.5 MG/2.5GM (1.62%) Gel   Commonly known as:  ANDROGEL   Stopped by:  Ibrahima Malhotra PA                Where to get your medicines      These medications were sent to [facility] - [place], [place] - [address]  [address], [facility] [address]     Phone:  [phone]     tiZANidine 2 MG tablet         These medications were sent to [facility] [address] [facility] [address] OF [address]  [address] [facility] [address]     Phone:   [phone]     glipiZIDE 5 MG 24 hr tablet    metFORMIN 1000 MG tablet    oxybutynin 10 MG 24 hr tablet         Call your pharmacy to confirm that your medication is ready for pickup. It may take up to 24 hours for them to receive the prescription. If the prescription is not ready within 3 business days, please contact your clinic or your provider.     We will let you know when these medications are ready. If you don't hear back within 3 business days, please contact us.     JARDIANCE 10 MG Tabs tablet                Primary Care Provider Office Phone # Fax #    Jackson Gorman PA-C [phone]       [facility] [address]  [facility] [address]        Equal Access to Services     KIRK CAGLE : Hadii aad ku hadasho Sojimenaali, waaxda luqadaha, qaybta kaalmada adeegyada, mel little . So [facility] [phone].    ATENCIÓN: Si habla español, tiene a mckenzie disposición servicios gratuitos de asistencia lingüística. [facility] [phone].    We comply with applicable federal civil rights laws and [place] laws. We do not discriminate on the basis of race, color, national origin, age, disability, sex, sexual orientation, or gender identity.            Thank you!     Thank you for choosing [facility]  for your care. Our goal is always to provide you with excellent care. Hearing back from our patients is one way we can continue to improve our services. Please take a few minutes to complete the written survey that you may receive in the mail after your visit with us. Thank you!             Your Updated Medication List - Protect others around you: Learn how to safely use, store and throw away your medicines at www.disposemymeds.org.          This list is accurate as of 5/7/18 10:10 AM.  Always use your most recent med list.                   Brand Name Dispense Instructions for use Diagnosis    Adjustable Lancing Device Misc           aspirin 81 MG  tablet      Take 1 tablet by mouth daily.        ASSURE COMFORT LANCETS 30G [facility]           CARESENS CONTROL A Soln           CARESENS N GLUCOSE SYSTEM Gris           CARESENS N GLUCOSE TEST test strip   Generic drug:  blood glucose monitoring           CLARITIN PO      Take  by mouth as needed.        glipiZIDE 5 MG 24 hr tablet    GLUCOTROL XL    30 tablet    TK 1 T PO D IN THE MORNING WITH BREAKFAST    Type 2 diabetes mellitus with other specified complication, without long-term current use of insulin (H)       indomethacin 50 MG capsule    INDOCIN          JARDIANCE 10 MG Tabs tablet   Generic drug:  empagliflozin     30 tablet    Take 1 tab PO daily    Type 2 diabetes mellitus with other specified complication, without long-term current use of insulin (H)       lisinopril-hydrochlorothiazide 20-25 MG per tablet    PRINZIDE/ZESTORETIC     Take 1 tablet by mouth daily.        ROGERS MULTI MEN PO      Take 1 tablet by mouth daily.        metFORMIN 1000 MG tablet    GLUCOPHAGE    60 tablet    Take 1 tablet (1,000 mg) by mouth 2 times daily (with meals) PT has been changed to a different medication - cannot remember name -    Type 2 diabetes mellitus with other specified complication, without long-term current use of insulin (H)       NIASPAN 500 MG CR tablet   Generic drug:  niacin      Take 1 tablet by mouth daily.        oxybutynin 10 MG 24 hr tablet    DITROPAN XL    90 tablet    Take 1 tablet (10 mg) by mouth daily    Lower urinary tract symptoms (LUTS)       pravastatin 40 MG tablet    PRAVACHOL     TK ONE T PO D        sildenafil 100 MG tablet    VIAGRA    12 tablet    Take 1 tablet (100 mg) by mouth daily as needed for erectile dysfunction    Erectile dysfunction, unspecified erectile dysfunction type       tamsulosin 0.4 MG capsule    FLOMAX     Take 1 capsule by mouth daily.        tiZANidine 2 MG tablet    ZANAFLEX    30 tablet    Take 1 tablet (2 mg) by mouth 3 times daily as needed for muscle spasms     Cervicalgia       TYLENOL PO      Take  by mouth as needed.

## 2018-05-08 RX ORDER — DAPAGLIFLOZIN 5 MG/1
5 TABLET, FILM COATED ORAL EVERY MORNING
Qty: 30 TABLET | Refills: 2 | Status: SHIPPED | OUTPATIENT
Start: 2018-05-08 | End: 2019-01-07

## 2018-05-08 NOTE — TELEPHONE ENCOUNTER
Please inform patient his jardiance wasn't covered so I sent farxiga ( a similar medicine ) instead.  Will recheck labs at his f/u in ~ 3 months.    Ja Malhotra PA-C

## 2018-05-09 NOTE — TELEPHONE ENCOUNTER
This writer attempted to contact patient on 05/09/18      Reason for call  Medication change and left detailed message.      If patient calls back:   Relay message from Ja Malhotra, (read verbatim), document that pt called and close encounter      Quita Adler, CMA

## 2018-05-10 NOTE — TELEPHONE ENCOUNTER
This writer attempted to contact August on 05/10/18      Reason for call inform alternative medication sent to his pharmacy & follow in 3 months  and left message.      If patient calls back:   Patient contacted by 1st floor [facility] Care Team (MA/TC). Inform patient that someone from the team will contact them, document that pt called and route to care team.         Tomy Mora MA

## 2018-05-11 ENCOUNTER — TELEPHONE (OUTPATIENT)
Dept: FAMILY MEDICINE | Facility: CLINIC | Age: 63
End: 2018-05-11

## 2018-05-11 DIAGNOSIS — E11.69 TYPE 2 DIABETES MELLITUS WITH OTHER SPECIFIED COMPLICATION, WITHOUT LONG-TERM CURRENT USE OF INSULIN (H): Primary | ICD-10-CM

## 2018-05-11 NOTE — TELEPHONE ENCOUNTER
Ly Benavidez 11:02 AM   Addend              Patient returned call     Patient has questions about test strips.     Best number to reach caller: Home number on file [phone] (home)     Is it ok to leave a detailed message: YES

## 2018-05-11 NOTE — TELEPHONE ENCOUNTER
Patient contacted and informed of medication change. Patient states he already picked up the alternative yesterday. He has to look at his schedule will call back another time to schedule the 3 month follow up with labs.    New telephone encounter for diabetes supply Rx request.  Chris Matson CMA

## 2018-05-11 NOTE — TELEPHONE ENCOUNTER
Patient contacted and states the pharmacy told him his insurance will cover One Touch Ultra or Perio. He needs new Rx for Diabetes supplies: glucometer, strips, lancets.    Okay to leave detailed message if calling patient back.    Chris Matson CMA

## 2018-05-11 NOTE — TELEPHONE ENCOUNTER
Patient returned call    Patient has questions about test strips.    Best number to reach caller: Home number on file [phone] (home)    Is it ok to leave a detailed message: YES

## 2018-05-11 NOTE — TELEPHONE ENCOUNTER
Prescription approved per [facility]. Sent to [facility] in [place].    Parveen Chu RN, BSN

## 2018-05-14 ENCOUNTER — OFFICE VISIT (OUTPATIENT)
Dept: FAMILY MEDICINE | Facility: CLINIC | Age: 63
End: 2018-05-14
Payer: COMMERCIAL

## 2018-05-14 VITALS
WEIGHT: 136 LBS | HEART RATE: 100 BPM | SYSTOLIC BLOOD PRESSURE: 138 MMHG | OXYGEN SATURATION: 98 % | TEMPERATURE: 98.3 F | DIASTOLIC BLOOD PRESSURE: 80 MMHG | BODY MASS INDEX: 22.66 KG/M2 | HEIGHT: 65 IN

## 2018-05-14 DIAGNOSIS — C61 PC (PROSTATE CANCER) (H): ICD-10-CM

## 2018-05-14 DIAGNOSIS — E11.69 TYPE 2 DIABETES MELLITUS WITH OTHER SPECIFIED COMPLICATION, WITHOUT LONG-TERM CURRENT USE OF INSULIN (H): ICD-10-CM

## 2018-05-14 DIAGNOSIS — M54.2 CERVICALGIA: ICD-10-CM

## 2018-05-14 DIAGNOSIS — R42 DIZZINESS: Primary | ICD-10-CM

## 2018-05-14 LAB
ALBUMIN SERPL-MCNC: 4.1 G/DL (ref 3.4–5)
ALP SERPL-CCNC: 85 U/L (ref 40–150)
ALT SERPL W P-5'-P-CCNC: 23 U/L (ref 0–70)
ANION GAP SERPL CALCULATED.3IONS-SCNC: 10 MMOL/L (ref 3–14)
AST SERPL W P-5'-P-CCNC: 21 U/L (ref 0–45)
BASOPHILS # BLD AUTO: 0 10E9/L (ref 0–0.2)
BASOPHILS NFR BLD AUTO: 0.1 %
BILIRUB SERPL-MCNC: 0.2 MG/DL (ref 0.2–1.3)
BUN SERPL-MCNC: 25 MG/DL (ref 7–30)
CALCIUM SERPL-MCNC: 10.2 MG/DL (ref 8.5–10.1)
CHLORIDE SERPL-SCNC: 105 MMOL/L (ref 94–109)
CO2 SERPL-SCNC: 27 MMOL/L (ref 20–32)
CREAT SERPL-MCNC: 1.31 MG/DL (ref 0.66–1.25)
DIFFERENTIAL METHOD BLD: ABNORMAL
EOSINOPHIL # BLD AUTO: 0.1 10E9/L (ref 0–0.7)
EOSINOPHIL NFR BLD AUTO: 1.5 %
ERYTHROCYTE [DISTWIDTH] IN BLOOD BY AUTOMATED COUNT: 14.1 % (ref 10–15)
GFR SERPL CREATININE-BSD FRML MDRD: 55 ML/MIN/1.7M2
GLUCOSE BLD-MCNC: 108 MG/DL (ref 70–99)
GLUCOSE SERPL-MCNC: 106 MG/DL (ref 70–99)
HCT VFR BLD AUTO: 34.4 % (ref 40–53)
HGB BLD-MCNC: 11.2 G/DL (ref 13.3–17.7)
LYMPHOCYTES # BLD AUTO: 2.8 10E9/L (ref 0.8–5.3)
LYMPHOCYTES NFR BLD AUTO: 37.4 %
MCH RBC QN AUTO: 25.3 PG (ref 26.5–33)
MCHC RBC AUTO-ENTMCNC: 32.6 G/DL (ref 31.5–36.5)
MCV RBC AUTO: 78 FL (ref 78–100)
MONOCYTES # BLD AUTO: 0.7 10E9/L (ref 0–1.3)
MONOCYTES NFR BLD AUTO: 9.8 %
NEUTROPHILS # BLD AUTO: 3.8 10E9/L (ref 1.6–8.3)
NEUTROPHILS NFR BLD AUTO: 51.2 %
PLATELET # BLD AUTO: 190 10E9/L (ref 150–450)
POTASSIUM SERPL-SCNC: 3.4 MMOL/L (ref 3.4–5.3)
PROT SERPL-MCNC: 8.2 G/DL (ref 6.8–8.8)
RBC # BLD AUTO: 4.42 10E12/L (ref 4.4–5.9)
SODIUM SERPL-SCNC: 142 MMOL/L (ref 133–144)
WBC # BLD AUTO: 7.4 10E9/L (ref 4–11)

## 2018-05-14 PROCEDURE — 85025 COMPLETE CBC W/AUTO DIFF WBC: CPT | Performed by: PHYSICIAN ASSISTANT

## 2018-05-14 PROCEDURE — 80053 COMPREHEN METABOLIC PANEL: CPT | Performed by: PHYSICIAN ASSISTANT

## 2018-05-14 PROCEDURE — 99214 OFFICE O/P EST MOD 30 MIN: CPT | Performed by: PHYSICIAN ASSISTANT

## 2018-05-14 PROCEDURE — 93000 ELECTROCARDIOGRAM COMPLETE: CPT | Performed by: PHYSICIAN ASSISTANT

## 2018-05-14 PROCEDURE — 82947 ASSAY GLUCOSE BLOOD QUANT: CPT | Mod: 59 | Performed by: PHYSICIAN ASSISTANT

## 2018-05-14 PROCEDURE — 36415 COLL VENOUS BLD VENIPUNCTURE: CPT | Performed by: PHYSICIAN ASSISTANT

## 2018-05-14 ASSESSMENT — PAIN SCALES - GENERAL: PAINLEVEL: NO PAIN (0)

## 2018-05-14 NOTE — PATIENT INSTRUCTIONS
At [facility], we strive to deliver an exceptional experience to you, every time we see you.  If you receive a survey in the mail, please send us back your thoughts. We really do value your feedback.    Based on your medical history, these are the current health maintenance/preventive care services that you are due for (some may have been done at this visit.)  Health Maintenance Due   Topic Date Due     FOOT EXAM Q1 YEAR  11/24/1956     EYE EXAM Q1 YEAR  11/24/1956     CREATININE Q1 YEAR  11/24/1955     LIPID MONITORING Q1 YEAR  11/24/1955     MICROALBUMIN Q1 YEAR  11/24/1955     HEPATITIS C SCREENING  11/24/1972     ADVANCE DIRECTIVE PLANNING Q5 YRS  11/24/2009       Suggested websites for health information:  Www.ScaleDB.org : Up to date and easily searchable information on multiple topics.  Www.medlineplus.gov : medication info, interactive tutorials, watch real surgeries online  Www.familydoctor.org : good info from the Academy of Family Physicians  Www.cdc.gov : public health info, travel advisories, epidemics (H1N1)  Www.aap.org : children's health info, normal development, vaccinations  Www.health.[facility].[place].us : [place] dept of health, public health issues in [place], N1N1    Your care team:                            Family Medicine Internal Medicine   MD Reji Barlow MD Shantel Branch-Fleming, MD Katya Georgiev PA-C Megan Hill, APRN CNP    Compa Acosta MD Pediatrics   Ja Malhotra, PADharmeshC  Traci Waldron, CNP MD Renetta Rosado APRN CNP   MD Shari Guzman MD Deborah Mielke, MD Kim Thein, APRN [facility]      Clinic hours: Monday - Thursday 7 am-7 pm; Fridays 7 am-5 pm.   Urgent care: Monday - Friday 11 am-9 pm; Saturday and Sunday 9 am-5 pm.  Pharmacy : Monday -Thursday 8 am-8 pm; Friday 8 am-6 pm; Saturday and Sunday 9 am-5 pm.     Clinic: [phone]   Pharmacy: [phone]      Dizziness (Uncertain Cause)  Dizziness is a common symptom. It  may be described as lightheadedness, spinning, or feeling like you are going to faint. Dizziness can have many causes.  Be sure to tell the healthcare provider about:    All medicines you take, including prescription, over-the-counter, herbs, and supplements    Any other symptoms you have    Any health problems you are being treated for    Any past major health problems you've had, such as a heart attack, balance issues, hearing problems, or blood pressure problems    Anything that causes the dizziness to get worse or better  Today's exam did not show an exact cause for your dizziness. Other tests may be needed. Follow up with your healthcare provider.  Home care    Dizziness that occurs with sudden standing may be a sign of mild dehydration. Drink extra fluids for the next few days.    If you recently started a new medicine, stopped a medicine, or had the dose of a current medicine changed, talk with the prescribing healthcare provider. Your medicine plan may need adjustment.    If dizziness lasts more than a few seconds, sit or lie down until it passes. This may help prevent injury in case you pass out. Get up slowly when you feel better.    Don't drive or use power tools or dangerous equipment until you have had no dizziness for at least 48 hours.  Follow-up care  Follow up with your healthcare provider for further evaluation within the next 7 days or as advised.  When to seek medical advice  Call your healthcare provider for any of the following:    Worsening of symptoms or new symptoms    Passing out or seizure    Repeated vomiting    Headache    Palpitations (the sense that your heart is fluttering or beating fast or hard)    Shortness of breath    Blood in vomit or stool (black or red color)    Weakness of an arm or leg or 1 side of the face    Vision or hearing changes    Trouble walking or speaking    Chest, arm, neck, back, or jaw pain  Date Last Reviewed: 11/1/2017 2000-2017 [employer]  [address] [place] [address]. All rights reserved. This information is not intended as a substitute for professional medical care. Always follow your healthcare professional's instructions.

## 2018-05-14 NOTE — MR AVS SNAPSHOT
After Visit Summary   5/14/2018    August Ray    MRN: [id number]           Patient Information     Date Of Birth          1955        Visit Information        Provider Department      5/14/2018 4:00 PM Ibrahima Malhotra PA [facility]        Today's Diagnoses     Medication side effects, initial encounter    -  1    Dizziness        Type 2 diabetes mellitus with other specified complication, without long-term current use of insulin (H)        Cervicalgia        PC (prostate cancer) (H)          Care Instructions    At [facility], we strive to deliver an exceptional experience to you, every time we see you.  If you receive a survey in the mail, please send us back your thoughts. We really do value your feedback.    Based on your medical history, these are the current health maintenance/preventive care services that you are due for (some may have been done at this visit.)  Health Maintenance Due   Topic Date Due     FOOT EXAM Q1 YEAR  11/24/1956     EYE EXAM Q1 YEAR  11/24/1955     CREATININE Q1 YEAR  11/24/1955     LIPID MONITORING Q1 YEAR  11/24/1955     MICROALBUMIN Q1 YEAR  11/24/1955     HEPATITIS C SCREENING  11/24/1972     ADVANCE DIRECTIVE PLANNING Q5 YRS  11/24/2009       Suggested websites for health information:  Www.[place].org : Up to date and easily searchable information on multiple topics.  Www.medlineplus.gov : medication info, interactive tutorials, watch real surgeries online  Www.familydoctor.org : good info from the Academy of Family Physicians  Www.cdc.gov : public health info, travel advisories, epidemics (H1N1)  Www.aap.org : children's health info, normal development, vaccinations  Www.health.state.[place].us : [place] dept of health, public health issues in [place], N1N1    Your care team:                            Family Medicine Internal Medicine   MD Reji Barlow MD Shantel Branch-Fleming, MD Katya  Emir Vasquez, APRN [facility]    Compa Acosta MD Pediatrics   JENNIFER Segundo, MD Renetta Coburn APRN CNP   MD Shari Guzman MD Deborah Mielke, MD Kim Thein, APRN [facility]      Clinic hours: Monday - Thursday 7 am-7 pm; Fridays 7 am-5 pm.   Urgent care: Monday - Friday 11 am-9 pm; Saturday and Sunday 9 am-5 pm.  Pharmacy : Monday -Thursday 8 am-8 pm; Friday 8 am-6 pm; Saturday and Sunday 9 am-5 pm.     Clinic: [phone]   Pharmacy: [phone]      Dizziness (Uncertain Cause)  Dizziness is a common symptom. It may be described as lightheadedness, spinning, or feeling like you are going to faint. Dizziness can have many causes.  Be sure to tell the healthcare provider about:    All medicines you take, including prescription, over-the-counter, herbs, and supplements    Any other symptoms you have    Any health problems you are being treated for    Any past major health problems you've had, such as a heart attack, balance issues, hearing problems, or blood pressure problems    Anything that causes the dizziness to get worse or better  Today's exam did not show an exact cause for your dizziness. Other tests may be needed. Follow up with your healthcare provider.  Home care    Dizziness that occurs with sudden standing may be a sign of mild dehydration. Drink extra fluids for the next few days.    If you recently started a new medicine, stopped a medicine, or had the dose of a current medicine changed, talk with the prescribing healthcare provider. Your medicine plan may need adjustment.    If dizziness lasts more than a few seconds, sit or lie down until it passes. This may help prevent injury in case you pass out. Get up slowly when you feel better.    Don't drive or use power tools or dangerous equipment until you have had no dizziness for at least 48 hours.  Follow-up care  Follow up with your healthcare provider for further evaluation within the next  7 days or as advised.  When to seek medical advice  Call your healthcare provider for any of the following:    Worsening of symptoms or new symptoms    Passing out or seizure    Repeated vomiting    Headache    Palpitations (the sense that your heart is fluttering or beating fast or hard)    Shortness of breath    Blood in vomit or stool (black or red color)    Weakness of an arm or leg or 1 side of the face    Vision or hearing changes    Trouble walking or speaking    Chest, arm, neck, back, or jaw pain  Date Last Reviewed: 11/1/2017 2000-2017 [employer]. [address] [place] [address]. All rights reserved. This information is not intended as a substitute for professional medical care. Always follow your healthcare professional's instructions.                Follow-ups after your visit        Additional Services     CARDIO [facility] ADULT REFERRAL       [facility] is referring you to Cardiology Services.       The  Representative will assist you in the coordination of your Cardiology care as prescribed by your physician.    The  Representative will call you within 24 hours to help schedule your appointment, or you may contact the  Representative at: [phone].         Type of Referral: New Cardiology Referral            Timeframe requested: 1-3 days       Coverage of these services is subject to the terms and limitations of your health insurance plan.  Please call member services at your health plan with any benefit or coverage questions.      If X-rays, CT or MRI's have been performed, please contact the facility where they were done to arrange for , prior to your scheduled appointment.  Please bring this referral request to your appointment and present it to your specialist.            [facility] PT, HAND, AND CHIROPRACTIC REFERRAL       **This order will print in the [facility] Scheduling Office**    Physical Therapy, Hand Therapy and  Chiropractic Care are available through:    *[facility]  *[facility]  *[facility]    Call one number to schedule at any of the above locations: [phone].    Your provider has referred you to: [facility] - PT and/or Chiropractic Care determined by clinical presentation at [facility] or [facility] [place] [facility]    Indication/Reason for Referral: Neck Pain  Onset of Illness: unspecified  Therapy Orders: Evaluate and Treat  Special Programs: None  Special Request: None    [place]      Additional Comments for the Therapist or Chiropractor: no    Please be aware that coverage of these services is subject to the terms and limitations of your health insurance plan.  Call member services at your health plan with any benefit or coverage questions.      Please bring the following to your appointment:    *Your personal calendar for scheduling future appointments  *Comfortable clothing                  Follow-up notes from your care team     Return if symptoms worsen or fail to improve.      Who to contact     If you have questions or need follow up information about today's clinic visit or your schedule please contact [facility] GONZALEZ directly at [phone].  Normal or non-critical lab and imaging results will be communicated to you by MyChart, letter or phone within 4 business days after the clinic has received the results. If you do not hear from us within 7 days, please contact the clinic through [employer] or phone. If you have a critical or abnormal lab result, we will notify you by phone as soon as possible.  Submit refill requests through Prezi or call your pharmacy and they will forward the refill request to us. Please allow 3 business days for your refill to be completed.          Additional Information About Your Visit        [employer] Information     Prezi lets you send messages to your doctor, view your test results, renew your  "prescriptions, schedule appointments and more. To sign up, go to [email or web address] . Click on \"Log in\" on the left side of the screen, which will take you to the Welcome page. Then click on \"Sign up Now\" on the right side of the page.     You will be asked to enter the access code listed below, as well as some personal information. Please follow the directions to create your username and password.     Your access code is: [email or web address]  Expires: 2018  9:02 AM     Your access code will  in 90 days. If you need help or a new code, please call your [place] clinic or [phone].        Care EveryWhere ID     This is your Care EveryWhere ID. This could be used by other organizations to access your [facility] medical records  [id number]        Your Vitals Were     Pulse Temperature Height Pulse Oximetry BMI (Body Mass Index)       100 98.3  F (36.8  C) (Oral) 5' 4.5\" (1.638 m) 98% 22.98 kg/m2        Blood Pressure from Last 3 Encounters:   18 138/80   18 146/83   18 (!) 180/93    Weight from Last 3 Encounters:   18 136 lb (61.7 kg)   18 136 lb (61.7 kg)   18 136 lb (61.7 kg)              We Performed the Following     CARDIO  ADULT REFERRAL     CBC with platelets differential     Comprehensive metabolic panel     EKG 12-lead complete w/read - Clinics     EKG 12-lead complete w/read - Clinics     Glucose whole blood     [facility] PT, HAND, AND CHIROPRACTIC REFERRAL          Today's Medication Changes          These changes are accurate as of 18  5:21 PM.  If you have any questions, ask your nurse or doctor.               Stop taking these medicines if you haven't already. Please contact your care team if you have questions.     JARDIANCE 10 MG Tabs tablet   Generic drug:  empagliflozin   Stopped by:  Ibrahima Malhotra PA                    Primary Care Provider Office Phone # Fax #    JESSI Latif [phone] " [facility] [id number]        Equal Access to Services     KIRK CAGLE : Hadii aleksandra tapia dom Sojacob, waaxda luqadaha, qaybta kakarlalaura pradhan, mel bookercanbobby marrero. So [facility] [phone].    ATENCIÓN: Si habla español, tiene a mckenzie disposición servicios gratuitos de asistencia lingüística. Llame al [phone].    We comply with applicable federal civil rights laws and [place] laws. We do not discriminate on the basis of race, color, national origin, age, disability, sex, sexual orientation, or gender identity.            Thank you!     Thank you for choosing [facility]  for your care. Our goal is always to provide you with excellent care. Hearing back from our patients is one way we can continue to improve our services. Please take a few minutes to complete the written survey that you may receive in the mail after your visit with us. Thank you!             Your Updated Medication List - Protect others around you: Learn how to safely use, store and throw away your medicines at www.disposemymeds.org.          This list is accurate as of 5/14/18  5:21 PM.  Always use your most recent med list.                   Brand Name Dispense Instructions for use Diagnosis    Adjustable Lancing Device Misc           aspirin 81 MG tablet      Take 1 tablet by mouth daily.        ASSURE COMFORT LANCETS 30G Misc           blood glucose lancets standard    no brand specified    100 each    Use to test blood sugar 1-4 times daily or as directed.    Type 2 diabetes mellitus with other specified complication, without long-term current use of insulin (H)       CARESENS CONTROL A Soln           * CARESENS N GLUCOSE SYSTEM Gris           * blood glucose monitoring meter device kit    no brand specified    1 kit    Use to test blood sugar 1-4 times daily or as directed. Per insurance: One touch    Type 2 diabetes mellitus with other specified complication, without long-term current  use of insulin (H)       * CARESENS N GLUCOSE TEST test strip   Generic drug:  blood glucose monitoring           * blood glucose monitoring test strip    no brand specified    100 strip    Use to test blood sugars 1-4 times daily or as directed. Per insurance    Type 2 diabetes mellitus with other specified complication, without long-term current use of insulin (H)       CLARITIN PO      Take  by mouth as needed.        dapagliflozin 5 MG Tabs tablet    FARXIGA    30 tablet    Take 1 tablet (5 mg) by mouth every morning    Type 2 diabetes mellitus with other specified complication, without long-term current use of insulin (H)       glipiZIDE 5 MG 24 hr tablet    GLUCOTROL XL    30 tablet    TK 1 T PO D IN THE MORNING WITH BREAKFAST    Type 2 diabetes mellitus with other specified complication, without long-term current use of insulin (H)       indomethacin 50 MG capsule    INDOCIN          lisinopril-hydrochlorothiazide 20-25 MG per tablet    PRINZIDE/ZESTORETIC     Take 1 tablet by mouth daily.        ROGERS MULTI MEN PO      Take 1 tablet by mouth daily.        metFORMIN 1000 MG tablet    GLUCOPHAGE    60 tablet    Take 1 tablet (1,000 mg) by mouth 2 times daily (with meals) PT has been changed to a different medication - cannot remember name -    Type 2 diabetes mellitus with other specified complication, without long-term current use of insulin (H)       NIASPAN 500 MG CR tablet   Generic drug:  niacin      Take 1 tablet by mouth daily.        oxybutynin 10 MG 24 hr tablet    DITROPAN XL    90 tablet    Take 1 tablet (10 mg) by mouth daily    Lower urinary tract symptoms (LUTS)       pravastatin 40 MG tablet    PRAVACHOL     TK ONE T PO D        sildenafil 100 MG tablet    VIAGRA    12 tablet    Take 1 tablet (100 mg) by mouth daily as needed for erectile dysfunction    Erectile dysfunction, unspecified erectile dysfunction type       tamsulosin 0.4 MG capsule    FLOMAX     Take 1 capsule by mouth daily.         tiZANidine 2 MG tablet    ZANAFLEX    30 tablet    Take 1 tablet (2 mg) by mouth 3 times daily as needed for muscle spasms    Cervicalgia       TYLENOL PO      Take  by mouth as needed.        * Notice:  This list has 4 medication(s) that are the same as other medications prescribed for you. Read the directions carefully, and ask your doctor or other care provider to review them with you.

## 2018-05-14 NOTE — PROGRESS NOTES
SUBJECTIVE:   August Ray is a 62 year old male who presents to clinic today for the following health issues:      Medication Followup of Zanaflex    Taking Medication as prescribed: NO    Side Effects:  Dizziness, feels unsteady while walking and jogging    Medication Helping Symptoms:  yes      rec'd records from prev clinic-   labs UTD (at [facility] tuntil June)- it looks like patient was stuck at A1C 8.2 fr some time, provider wanted to start insulin which pt didn't want to do.  Also had to change DM medicine fye to insurance.  Sugars have been good but hasnt checked it while feeling lightheaded.      hasnt been to p/t yet,         Has been off androgel for 6-7 years and would like to start.  Hx prostate CA              Allergies   Allergen Reactions     Hydrocodone-Acetaminophen      syncope     Insulin Glargine      Itchy rash     No Clinical Screening - See Comments      Intolerance to this     Oxycodone      States he passed out/fell after taking it       Patient Active Problem List   Diagnosis     Erectile dysfunction     PC (prostate cancer) (H)     Decreased libido     HTN (hypertension)     Hyperlipidemia     Orthostatic hypotension     Rotator cuff tear arthropathy of right shoulder     Type 2 diabetes mellitus with other specified complication, without long-term current use of insulin (H)     Gout     Allergic rhinitis     Insomnia     Lower urinary tract symptoms (LUTS)       Past Medical History:   Diagnosis Date     Diabetes type 2, controlled (H)      HTN (hypertension)      Hyperlipidemia          Current Outpatient Prescriptions on File Prior to Visit:  Acetaminophen (TYLENOL PO) Take  by mouth as needed.   aspirin 81 MG tablet Take 1 tablet by mouth daily.   ASSURE COMFORT LANCETS 30G MISC    blood glucose (NO BRAND SPECIFIED) lancets standard Use to test blood sugar 1-4 times daily or as directed.   Blood Glucose Calibration (CARESENS CONTROL A) SOLN    blood glucose monitoring (NO BRAND  SPECIFIED) meter device kit Use to test blood sugar 1-4 times daily or as directed. Per insurance: One touch   blood glucose monitoring (NO BRAND SPECIFIED) test strip Use to test blood sugars 1-4 times daily or as directed. Per insurance   Blood Glucose Monitoring Suppl (CARESENS N GLUCOSE SYSTEM) MARIAELENA    CARESENS N GLUCOSE TEST test strip    dapagliflozin (FARXIGA) 5 MG TABS tablet Take 1 tablet (5 mg) by mouth every morning   glipiZIDE (GLUCOTROL XL) 5 MG 24 hr tablet TK 1 T PO D IN THE MORNING WITH BREAKFAST   indomethacin (INDOCIN) 50 MG capsule    Lancet Devices (ADJUSTABLE LANCING DEVICE) MISC    lisinopril-hydrochlorothiazide (PRINZIDE,ZESTORETIC) 20-25 MG per tablet Take 1 tablet by mouth daily.   Loratadine (CLARITIN PO) Take  by mouth as needed.   metFORMIN (GLUCOPHAGE) 1000 MG tablet Take 1 tablet (1,000 mg) by mouth 2 times daily (with meals) PT has been changed to a different medication - cannot remember name -   Multiple Vitamins-Minerals (ROGERS MULTI MEN PO) Take 1 tablet by mouth daily.   niacin (NIASPAN) 500 MG CR tablet Take 1 tablet by mouth daily.   oxybutynin (DITROPAN XL) 10 MG 24 hr tablet Take 1 tablet (10 mg) by mouth daily   pravastatin (PRAVACHOL) 40 MG tablet TK ONE T PO D   sildenafil (VIAGRA) 100 MG cap/tab Take 1 tablet (100 mg) by mouth daily as needed for erectile dysfunction   tamsulosin (FLOMAX) 0.4 MG 24 hr capsule Take 1 capsule by mouth daily.   tiZANidine (ZANAFLEX) 2 MG tablet Take 1 tablet (2 mg) by mouth 3 times daily as needed for muscle spasms     No current facility-administered medications on file prior to visit.     Social History   Substance Use Topics     Smoking status: Former Smoker     Quit date: 11/26/1991     Smokeless tobacco: Never Used     Alcohol use Yes       No family history on file.    ROS:  General: negative for fever  Resp: negative for chest pain   CV: negative for chest pain, syncope or near sycnope  Neurologic:as above, denies vertigo  ENDO- DM hx as  "above    OBJECTIVE:  /80  Pulse 100  Temp 98.3  F (36.8  C) (Oral)  Ht 1.638 m (5' 4.5\")  Wt 61.7 kg (136 lb)  SpO2 98%  BMI 22.98 kg/m2   General:   awake, alert, and cooperative.  NAD.   Head: Normocephalic, atraumatic.  Eyes: Conjunctiva clear, non icteric. PERRLA. EOMI.  Nose: No lesions.  Mouth / Throat: Normal dentition.  No oral lesions. Pharynx non erythematous, tonsils without hypertrophy. Tongue midline.  Neck: Supple. BREE grossly.   Heart: Regular rate and rhythm. No murmur.  Lungs: Chest is clear; no wheezes or rales.   Neuro: Alert and oriented - normal speech. Cranial nerves intact.  Normal strength. Using extremities freely.    ASSESSMENT:well appearing. Likely the musle relaxant given age  but does have cardiac risk factors with stable mild tachycardia on ekg and possible old infarct.   Unfortunately, all muscle relaxants likely to be problematic for patient.  No dizziness in clinic and only mildly tachy and otherwise vitals WNL . Could also be related to glycemic control and I advised he check his glucose the nexttme symptoms emerge.  .   Did discuss that we could justify Emergency Department eval for his symptoms but patient declined this.  He agrees to close cardiology f/u and to contact EMS if symptoms worsen or become more severe.  Will d/c all muscle relaxants    ICD-10-CM    1. Dizziness R42 CBC with platelets differential     Comprehensive metabolic panel     EKG 12-lead complete w/read - Clinics     EKG 12-lead complete w/read - Clinics     CARDIO  ADULT REFERRAL   2. Type 2 diabetes mellitus with other specified complication, without long-term current use of insulin (H) E11.69 Glucose whole blood   3. Cervicalgia M54.2 [facility] PT, HAND, AND CHIROPRACTIC REFERRAL   4. PC (prostate cancer) (H) C61            PLAN:   Follow up URO to dsicuss resuming testosterone given prostate CA hx.  :    Advised about symptoms which might herald more serious problems.          "

## 2018-05-15 ENCOUNTER — TELEPHONE (OUTPATIENT)
Dept: FAMILY MEDICINE | Facility: CLINIC | Age: 63
End: 2018-05-15

## 2018-05-15 NOTE — TELEPHONE ENCOUNTER
Telephone encounter.    This writer attempted to contact Adelfo on 05/15/18  Reason for call lab results and left message.  If patient calls back:   Patient contacted by 1st floor [facility] Team (MA/TC). Inform patient that someone from the team will contact them, document that pt called and route to care team.         Chris Matson

## 2018-05-15 NOTE — LETTER
[address]   [address]  Tel. [phone] / Fax [phone]        May 18, 2018        August Ray  [address]    [facility] [address]              Dear Mr. Ray,      LAB RESULTS:     The results of your recent diabetes labs were NORMAL. Please see the attached lab report. Drink plenty of water and continue to work on your healthy diet and exercise. Let us know if you are not feeling better. Follow up with Ja Malhotra as recommended in 3-6 months.    If you have any further questions or problems, please contact our office.    Sincerely,        Isamar Olvera MD

## 2018-05-15 NOTE — PROGRESS NOTES
Please call patient with results , and ask how he is feeling.  His labs were basically normal for him. He should drink plenty of water    Ibrahima Malhotra

## 2018-05-16 NOTE — TELEPHONE ENCOUNTER
This writer attempted to contact patient on 05/16/18      Reason for call results and left message.      If patient calls back:   Patient contacted by 1st floor [facility] Team (MA/TC). Inform patient that someone from the team will contact them, document that pt called and route to care team.         Quita Adler, CMA

## 2018-05-17 NOTE — TELEPHONE ENCOUNTER
This writer attempted to contact Adelfo on 05/17/18      Reason for call inform results and left message.      If patient calls back:   Patient contacted by 1st floor [facility] Team (MA/TC). Inform patient that someone from the team will contact them, document that pt called and route to care team.         Tomy Mora MA

## 2018-05-21 ENCOUNTER — TELEPHONE (OUTPATIENT)
Dept: FAMILY MEDICINE | Facility: CLINIC | Age: 63
End: 2018-05-21

## 2018-05-21 NOTE — TELEPHONE ENCOUNTER
Ja Malhotra PA-C completed form for patient and the form has been mailed back to [employer], scanning center [address] [place] [id number].  Kalen Loya,  [facility]

## 2018-06-01 ENCOUNTER — TELEPHONE (OUTPATIENT)
Dept: FAMILY MEDICINE | Facility: CLINIC | Age: 63
End: 2018-06-01

## 2018-06-12 ENCOUNTER — THERAPY VISIT (OUTPATIENT)
Dept: PHYSICAL THERAPY | Facility: CLINIC | Age: 63
End: 2018-06-12
Payer: COMMERCIAL

## 2018-06-12 DIAGNOSIS — M54.2 CERVICALGIA: Primary | ICD-10-CM

## 2018-06-12 PROCEDURE — 97161 PT EVAL LOW COMPLEX 20 MIN: CPT | Mod: GP | Performed by: PHYSICAL THERAPIST

## 2018-06-12 PROCEDURE — 97112 NEUROMUSCULAR REEDUCATION: CPT | Mod: GP | Performed by: PHYSICAL THERAPIST

## 2018-06-12 PROCEDURE — 97110 THERAPEUTIC EXERCISES: CPT | Mod: GP | Performed by: PHYSICAL THERAPIST

## 2018-06-12 NOTE — MR AVS SNAPSHOT
After Visit Summary   6/12/2018    August Ray    MRN: [id number]           Patient Information     Date Of Birth          1955        Visit Information        Provider Department      6/12/2018 2:40 PM Torsten See, PT [facility]        Today's Diagnoses     Cervicalgia    -  1       Follow-ups after your visit        Your next 10 appointments already scheduled     Jun 21, 2018 10:10 AM CDT   [facility] Spine with Torsten See PT   [facility] ([facility] [place]  )    [address]  [facility] [id number] [phone]              Who to contact     If you have questions or need follow up information about today's clinic visit or your schedule please contact [facility] directly at [phone].  Normal or non-critical lab and imaging results will be communicated to you by MyChart, letter or phone within 4 business days after the clinic has received the results. If you do not hear from us within 7 days, please contact the clinic through MyChart or phone. If you have a critical or abnormal lab result, we will notify you by phone as soon as possible.  Submit refill requests through [employer] or call your pharmacy and they will forward the refill request to us. Please allow 3 business days for your refill to be completed.          Additional Information About Your Visit        Care EveryWhere ID     This is your Care EveryWhere ID. This could be used by other organizations to access your [facility] medical records  [id number]         Blood Pressure from Last 3 Encounters:   05/14/18 138/80   05/07/18 146/83   05/07/18 (!) 180/93    Weight from Last 3 Encounters:   05/14/18 61.7 kg (136 lb)   05/07/18 61.7 kg (136 lb)   05/07/18 61.7 kg (136 lb)              We Performed the Following     [facility] Inital Eval Report     Neuromuscular Re-Education     PT Flakito, Low Complexity ([id number])      Therapeutic Exercises        Primary Care Provider Office Phone # Fax #    JESSI Latif [phone] [address] [address] [facility] [address]        Equal Access to Services     KIRK CAGLE : Hadii aad ku hadarnolo Soomaali, waaxda luqadaha, qaybta kaalmada adeegyada, mel brandtcristal marrero. So [facility] [phone].    ATENCIÓN: Si habla español, tiene a mckenzie disposición servicios gratuitos de asistencia lingüística. Llame al [phone].    We comply with applicable federal civil rights laws and [place] laws. We do not discriminate on the basis of race, color, national origin, age, disability, sex, sexual orientation, or gender identity.            Thank you!     Thank you for choosing [facility]  for your care. Our goal is always to provide you with excellent care. Hearing back from our patients is one way we can continue to improve our services. Please take a few minutes to complete the written survey that you may receive in the mail after your visit with us. Thank you!             Your Updated Medication List - Protect others around you: Learn how to safely use, store and throw away your medicines at www.disposemymeds.org.          This list is accurate as of 6/12/18 11:59 PM.  Always use your most recent med list.                   Brand Name Dispense Instructions for use Diagnosis    Adjustable Lancing Device Misc           aspirin 81 MG tablet      Take 1 tablet by mouth daily.        ASSURE COMFORT LANCETS 30G Misc           blood glucose lancets standard    no brand specified    100 each    Use to test blood sugar 1-4 times daily or as directed.    Type 2 diabetes mellitus with other specified complication, without long-term current use of insulin (H)       CARESENS CONTROL A Soln           * CARESENS N GLUCOSE SYSTEM Gris           * blood glucose monitoring meter device kit    no brand specified    1 kit    Use to test  blood sugar 1-4 times daily or as directed. Per insurance: One touch    Type 2 diabetes mellitus with other specified complication, without long-term current use of insulin (H)       * CARESENS N GLUCOSE TEST test strip   Generic drug:  blood glucose monitoring           * blood glucose monitoring test strip    no brand specified    100 strip    Use to test blood sugars 1-4 times daily or as directed. Per insurance    Type 2 diabetes mellitus with other specified complication, without long-term current use of insulin (H)       CLARITIN PO      Take  by mouth as needed.        dapagliflozin 5 MG Tabs tablet    FARXIGA    30 tablet    Take 1 tablet (5 mg) by mouth every morning    Type 2 diabetes mellitus with other specified complication, without long-term current use of insulin (H)       glipiZIDE 5 MG 24 hr tablet    GLUCOTROL XL    30 tablet    TK 1 T PO D IN THE MORNING WITH BREAKFAST    Type 2 diabetes mellitus with other specified complication, without long-term current use of insulin (H)       indomethacin 50 MG capsule    INDOCIN          lisinopril-hydrochlorothiazide 20-25 MG per tablet    PRINZIDE/ZESTORETIC     Take 1 tablet by mouth daily.        ROGERS MULTI MEN PO      Take 1 tablet by mouth daily.        metFORMIN 1000 MG tablet    GLUCOPHAGE    60 tablet    Take 1 tablet (1,000 mg) by mouth 2 times daily (with meals) PT has been changed to a different medication - cannot remember name -    Type 2 diabetes mellitus with other specified complication, without long-term current use of insulin (H)       NIASPAN 500 MG CR tablet   Generic drug:  niacin      Take 1 tablet by mouth daily.        oxybutynin 10 MG 24 hr tablet    DITROPAN XL    90 tablet    Take 1 tablet (10 mg) by mouth daily    Lower urinary tract symptoms (LUTS)       pravastatin 40 MG tablet    PRAVACHOL     TK ONE T PO D        sildenafil 100 MG tablet    VIAGRA    12 tablet    Take 1 tablet (100 mg) by mouth daily as needed for erectile  dysfunction    Erectile dysfunction, unspecified erectile dysfunction type       tamsulosin 0.4 MG capsule    FLOMAX     Take 1 capsule by mouth daily.        tiZANidine 2 MG tablet    ZANAFLEX    30 tablet    Take 1 tablet (2 mg) by mouth 3 times daily as needed for muscle spasms    Cervicalgia       TYLENOL PO      Take  by mouth as needed.        * Notice:  This list has 4 medication(s) that are the same as other medications prescribed for you. Read the directions carefully, and ask your doctor or other care provider to review them with you.
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
## 2018-06-14 PROBLEM — M54.2 CERVICALGIA: Status: ACTIVE | Noted: 2018-06-14

## 2018-06-21 ENCOUNTER — THERAPY VISIT (OUTPATIENT)
Dept: PHYSICAL THERAPY | Facility: CLINIC | Age: 63
End: 2018-06-21
Payer: COMMERCIAL

## 2018-06-21 DIAGNOSIS — M54.2 CERVICALGIA: ICD-10-CM

## 2018-06-21 PROCEDURE — 97530 THERAPEUTIC ACTIVITIES: CPT | Mod: GP | Performed by: PHYSICAL THERAPIST

## 2018-06-21 PROCEDURE — 97140 MANUAL THERAPY 1/> REGIONS: CPT | Mod: GP | Performed by: PHYSICAL THERAPIST

## 2018-06-21 PROCEDURE — 97110 THERAPEUTIC EXERCISES: CPT | Mod: GP | Performed by: PHYSICAL THERAPIST

## 2018-06-25 ENCOUNTER — OFFICE VISIT (OUTPATIENT)
Dept: FAMILY MEDICINE | Facility: CLINIC | Age: 63
End: 2018-06-25
Payer: COMMERCIAL

## 2018-06-25 VITALS
OXYGEN SATURATION: 98 % | HEART RATE: 85 BPM | WEIGHT: 134.7 LBS | BODY MASS INDEX: 22.44 KG/M2 | RESPIRATION RATE: 16 BRPM | DIASTOLIC BLOOD PRESSURE: 68 MMHG | SYSTOLIC BLOOD PRESSURE: 138 MMHG | HEIGHT: 65 IN | TEMPERATURE: 98.6 F

## 2018-06-25 DIAGNOSIS — E11.69 TYPE 2 DIABETES MELLITUS WITH OTHER SPECIFIED COMPLICATION, WITHOUT LONG-TERM CURRENT USE OF INSULIN (H): ICD-10-CM

## 2018-06-25 DIAGNOSIS — R35.89 POLYURIA: ICD-10-CM

## 2018-06-25 DIAGNOSIS — R35.0 URINARY FREQUENCY: ICD-10-CM

## 2018-06-25 DIAGNOSIS — R39.9 UTI SYMPTOMS: Primary | ICD-10-CM

## 2018-06-25 DIAGNOSIS — R81 GLUCOSURIA: ICD-10-CM

## 2018-06-25 LAB
ALBUMIN UR-MCNC: NEGATIVE MG/DL
APPEARANCE UR: CLEAR
BILIRUB UR QL STRIP: NEGATIVE
COLOR UR AUTO: YELLOW
GLUCOSE UR STRIP-MCNC: >=1000 MG/DL
HGB UR QL STRIP: NEGATIVE
KETONES UR STRIP-MCNC: NEGATIVE MG/DL
LEUKOCYTE ESTERASE UR QL STRIP: NEGATIVE
NITRATE UR QL: NEGATIVE
PH UR STRIP: 6.5 PH (ref 5–7)
SOURCE: ABNORMAL
SP GR UR STRIP: 1.01 (ref 1–1.03)
UROBILINOGEN UR STRIP-ACNC: 0.2 EU/DL (ref 0.2–1)

## 2018-06-25 PROCEDURE — 81003 URINALYSIS AUTO W/O SCOPE: CPT | Performed by: PHYSICIAN ASSISTANT

## 2018-06-25 PROCEDURE — 99214 OFFICE O/P EST MOD 30 MIN: CPT | Performed by: PHYSICIAN ASSISTANT

## 2018-06-25 RX ORDER — DAPAGLIFLOZIN 10 MG/1
10 TABLET, FILM COATED ORAL DAILY
Qty: 30 TABLET | Refills: 2 | Status: SHIPPED | OUTPATIENT
Start: 2018-06-25 | End: 2018-11-26

## 2018-06-25 RX ORDER — EMPAGLIFLOZIN 10 MG/1
10 TABLET, FILM COATED ORAL DAILY
Refills: 2 | COMMUNITY
Start: 2018-06-16 | End: 2018-11-20

## 2018-06-25 ASSESSMENT — PAIN SCALES - GENERAL: PAINLEVEL: MILD PAIN (2)

## 2018-06-25 NOTE — PROGRESS NOTES
SUBJECTIVE:   August Ray is a 62 year old male who presents to clinic today for the following health issues:      Genitourinary - Male  Onset: 2-3 months    Description:   Dysuria (painful urination): YES - intermittent  Hematuria (blood in urine): no   Frequency: YES  Are you urinating at night : YES  Hesitancy (delay in urine): no   Retention (unable to empty): no   Decrease in urinary flow: no   Incontinence: no     Progression of Symptoms:  intermittent    Accompanying Signs & Symptoms:  Fever: no   Back/Flank pain: YES  Urethral discharge: no   Testicle lumps/masses/pain: no   Nausea and/or vomiting: no   Abdominal pain: no     History:   History of frequent UTI's: YES  History of kidney stones: no   History of hernias: no   Personal or Family history of Prostate problems: YES  Sexually active: rarely    Precipitating factors:   NA    Alleviating factors:  NA    PSA last year undewtectable- due for rotine prostt cancer surbveillance f/ u October.   Patient spoke with urology who did not think it was a recurrence.      A1c 7.9 6 weeks ago, glucose 108.  Home sugars  Labile- diet inconsistent.  Some post prandial in 300s though directly relates to when he eats things he knows he shouldn't.  Last night though 131 after meal.  Fasting glucose have been within range.       CARDIO - dizziness has resolved.              Allergies   Allergen Reactions     Hydrocodone-Acetaminophen      syncope     Insulin Glargine      Itchy rash     No Clinical Screening - See Comments      Intolerance to this     Oxycodone      States he passed out/fell after taking it       Past Medical History:   Diagnosis Date     Diabetes type 2, controlled (H)      HTN (hypertension)      Hyperlipidemia        Patient Active Problem List   Diagnosis     Erectile dysfunction     PC (prostate cancer) (H)     Decreased libido     HTN (hypertension)     Hyperlipidemia     Orthostatic hypotension     Rotator cuff tear arthropathy of right  shoulder     Type 2 diabetes mellitus with other specified complication, without long-term current use of insulin (H)     Gout     Allergic rhinitis     Insomnia     Lower urinary tract symptoms (LUTS)     Cervicalgia         Current Outpatient Prescriptions on File Prior to Visit:  Acetaminophen (TYLENOL PO) Take  by mouth as needed.   aspirin 81 MG tablet Take 1 tablet by mouth daily.   ASSURE COMFORT LANCETS 30G MISC    blood glucose (NO BRAND SPECIFIED) lancets standard Use to test blood sugar 1-4 times daily or as directed.   Blood Glucose Calibration (CARESENS CONTROL A) SOLN    blood glucose monitoring (NO BRAND SPECIFIED) meter device kit Use to test blood sugar 1-4 times daily or as directed. Per insurance: One touch   blood glucose monitoring (NO BRAND SPECIFIED) test strip Use to test blood sugars 1-4 times daily or as directed. Per insurance   Blood Glucose Monitoring Suppl ([employer] N GLUCOSE SYSTEM) MARIAELENA    CARESENS N GLUCOSE TEST test strip    dapagliflozin (FARXIGA) 5 MG TABS tablet Take 1 tablet (5 mg) by mouth every morning   glipiZIDE (GLUCOTROL XL) 5 MG 24 hr tablet TK 1 T PO D IN THE MORNING WITH BREAKFAST   indomethacin (INDOCIN) 50 MG capsule    Lancet Devices (ADJUSTABLE LANCING DEVICE) MISC    lisinopril-hydrochlorothiazide (PRINZIDE,ZESTORETIC) 20-25 MG per tablet Take 1 tablet by mouth daily.   Loratadine (CLARITIN PO) Take  by mouth as needed.   metFORMIN (GLUCOPHAGE) 1000 MG tablet Take 1 tablet (1,000 mg) by mouth 2 times daily (with meals) PT has been changed to a different medication - cannot remember name -   Multiple Vitamins-Minerals (ROGERS MULTI MEN PO) Take 1 tablet by mouth daily.   niacin (NIASPAN) 500 MG CR tablet Take 1 tablet by mouth daily.   oxybutynin (DITROPAN XL) 10 MG 24 hr tablet Take 1 tablet (10 mg) by mouth daily   pravastatin (PRAVACHOL) 40 MG tablet TK ONE T PO D   sildenafil (VIAGRA) 100 MG cap/tab Take 1 tablet (100 mg) by mouth daily as needed for erectile  "dysfunction   tamsulosin (FLOMAX) 0.4 MG 24 hr capsule Take 1 capsule by mouth daily.   tiZANidine (ZANAFLEX) 2 MG tablet Take 1 tablet (2 mg) by mouth 3 times daily as needed for muscle spasms     No current facility-administered medications on file prior to visit.     Social History   Substance Use Topics     Smoking status: Former Smoker     Quit date: 11/26/1991     Smokeless tobacco: Never Used     Alcohol use Yes       History reviewed. No pertinent family history.    ROS:  General: negative for fever  ENDO DM as above   polyuria as above    OBJECTIVE:  /68  Pulse 85  Temp 98.6  F (37  C) (Oral)  Resp 16  Ht 5' 4.5\" (1.638 m)  Wt 134 lb 11.2 oz (61.1 kg)  SpO2 98%  BMI 22.76 kg/m2   General:   awake, alert, and cooperative.  NAD.   Head: Normocephalic, atraumatic.  Eyes: Conjunctiva clear,   Heart: Regular rate and rhythm. No murmur.  Lungs: Chest is clear; no wheezes or rales.   Neuro: Alert and oriented - normal speech.    ASSESSMENT:UA only large glucose, will improve glycemic (particularly post prandial control as below.  Would consider adding januvia as needed.      ICD-10-CM    1. UTI symptoms R39.9 UA reflex to Microscopic and Culture   2. Urinary frequency R35.0    3. Type 2 diabetes mellitus with other specified complication, without long-term current use of insulin (H) E11.69 dapagliflozin (FARXIGA) 10 MG TABS tablet   4. Glucosuria R81    5. Polyuria R35.8      I spent a total of 20 minutes in face to face time with > 50% of the visit related to  counseling/care coordination.      PLAN:   Follow up:  6 weeks or prn  Advised about symptoms which might herald more serious problems.            "

## 2018-06-25 NOTE — MR AVS SNAPSHOT
"              After Visit Summary   6/25/2018    August Ray    MRN: [id number]           Patient Information     Date Of Birth          1955        Visit Information        Provider Department      6/25/2018 9:40 AM Ibrahima Malhotra PA [facility]        Today's Diagnoses     UTI symptoms    -  1    Urinary frequency        Type 2 diabetes mellitus with other specified complication, without long-term current use of insulin (H)        Glucosuria        Polyuria           Follow-ups after your visit        Follow-up notes from your care team     Return in about 6 weeks (around 8/6/2018), or if symptoms worsen or fail to improve.      Who to contact     If you have questions or need follow up information about today's clinic visit or your schedule please contact [facility] directly at [phone].  Normal or non-critical lab and imaging results will be communicated to you by MyChart, letter or phone within 4 business days after the clinic has received the results. If you do not hear from us within 7 days, please contact the clinic through MyChart or phone. If you have a critical or abnormal lab result, we will notify you by phone as soon as possible.  Submit refill requests through [employer] or call your pharmacy and they will forward the refill request to us. Please allow 3 business days for your refill to be completed.          Additional Information About Your Visit        Care EveryWhere ID     This is your Care EveryWhere ID. This could be used by other organizations to access your [facility] medical records  [id number]        Your Vitals Were     Pulse Temperature Respirations Height Pulse Oximetry BMI (Body Mass Index)    85 98.6  F (37  C) (Oral) 16 5' 4.5\" (1.638 m) 98% 22.76 kg/m2       Blood Pressure from Last 3 Encounters:   06/25/18 138/68   05/14/18 138/80   05/07/18 146/83    Weight from Last 3 Encounters:   06/25/18 134 lb 11.2 oz (61.1 kg) "   05/14/18 136 lb (61.7 kg)   05/07/18 136 lb (61.7 kg)              We Performed the Following     UA reflex to Microscopic and Culture          Today's Medication Changes          These changes are accurate as of 6/25/18  9:42 AM.  If you have any questions, ask your nurse or doctor.               These medicines have changed or have updated prescriptions.        Dose/Directions    * dapagliflozin 5 MG Tabs tablet   Commonly known as:  FARXIGA   This may have changed:  Another medication with the same name was added. Make sure you understand how and when to take each.   Used for:  Type 2 diabetes mellitus with other specified complication, without long-term current use of insulin (H)   Changed by:  Ibrahiam Malhotra PA        Dose:  5 mg   Take 1 tablet (5 mg) by mouth every morning   Quantity:  30 tablet   Refills:  2       * dapagliflozin 10 MG Tabs tablet   Commonly known as:  FARXIGA   This may have changed:  You were already taking a medication with the same name, and this prescription was added. Make sure you understand how and when to take each.   Used for:  Type 2 diabetes mellitus with other specified complication, without long-term current use of insulin (H)   Changed by:  Ibrahima Malhotra PA        Dose:  10 mg   Take 1 tablet (10 mg) by mouth daily   Quantity:  30 tablet   Refills:  2       * Notice:  This list has 2 medication(s) that are the same as other medications prescribed for you. Read the directions carefully, and ask your doctor or other care provider to review them with you.         Where to get your medicines      These medications were sent to [facility] [address] [address] AT [address] OF [address] [address]     Phone:  [phone]     dapagliflozin 10 MG Tabs tablet                Primary Care Provider Office Phone # Fax #    JESSI Latif [phone]       [address]  [facility] [address]        Equal Access to Services     WES CAGLE : Hadii aleksandra tapia dom Sojimenaali, waaxda luqadaha, qaybta kaalmalaura pradhan, mel bookercanbobby marrero. So [facility] [phone].    ATENCIÓN: Si habla español, tiene a mckenzie disposición servicios gratuitos de asistencia lingüística. [facility] [phone].    We comply with applicable federal civil rights laws and [place] laws. We do not discriminate on the basis of race, color, national origin, age, disability, sex, sexual orientation, or gender identity.            Thank you!     Thank you for choosing [facility]  for your care. Our goal is always to provide you with excellent care. Hearing back from our patients is one way we can continue to improve our services. Please take a few minutes to complete the written survey that you may receive in the mail after your visit with us. Thank you!             Your Updated Medication List - Protect others around you: Learn how to safely use, store and throw away your medicines at www.disposemymeds.org.          This list is accurate as of 6/25/18  9:42 AM.  Always use your most recent med list.                   Brand Name Dispense Instructions for use Diagnosis    Adjustable Lancing Device Misc           aspirin 81 MG tablet      Take 1 tablet by mouth daily.        ASSURE COMFORT LANCETS 30G Misc           blood glucose lancets standard    no brand specified    100 each    Use to test blood sugar 1-4 times daily or as directed.    Type 2 diabetes mellitus with other specified complication, without long-term current use of insulin (H)       CARESENS CONTROL A Soln           * CARESENS N GLUCOSE SYSTEM Gris           * blood glucose monitoring meter device kit    no brand specified    1 kit    Use to test blood sugar 1-4 times daily or as directed. Per insurance: One touch    Type 2 diabetes mellitus with other specified complication, without long-term current use of  insulin (H)       * CARESENS N GLUCOSE TEST test strip   Generic drug:  blood glucose monitoring           * blood glucose monitoring test strip    no brand specified    100 strip    Use to test blood sugars 1-4 times daily or as directed. Per insurance    Type 2 diabetes mellitus with other specified complication, without long-term current use of insulin (H)       CLARITIN PO      Take  by mouth as needed.        * dapagliflozin 5 MG Tabs tablet    FARXIGA    30 tablet    Take 1 tablet (5 mg) by mouth every morning    Type 2 diabetes mellitus with other specified complication, without long-term current use of insulin (H)       * dapagliflozin 10 MG Tabs tablet    FARXIGA    30 tablet    Take 1 tablet (10 mg) by mouth daily    Type 2 diabetes mellitus with other specified complication, without long-term current use of insulin (H)       glipiZIDE 5 MG 24 hr tablet    GLUCOTROL XL    30 tablet    TK 1 T PO D IN THE MORNING WITH BREAKFAST    Type 2 diabetes mellitus with other specified complication, without long-term current use of insulin (H)       indomethacin 50 MG capsule    INDOCIN          JARDIANCE 10 MG Tabs tablet   Generic drug:  empagliflozin      Take 10 mg by mouth daily        lisinopril-hydrochlorothiazide 20-25 MG per tablet    PRINZIDE/ZESTORETIC     Take 1 tablet by mouth daily.        ROGERS MULTI MEN PO      Take 1 tablet by mouth daily.        metFORMIN 1000 MG tablet    GLUCOPHAGE    60 tablet    Take 1 tablet (1,000 mg) by mouth 2 times daily (with meals) PT has been changed to a different medication - cannot remember name -    Type 2 diabetes mellitus with other specified complication, without long-term current use of insulin (H)       NIASPAN 500 MG CR tablet   Generic drug:  niacin      Take 1 tablet by mouth daily.        oxybutynin 10 MG 24 hr tablet    DITROPAN XL    90 tablet    Take 1 tablet (10 mg) by mouth daily    Lower urinary tract symptoms (LUTS)       pravastatin 40 MG tablet     PRAVACHOL     TK ONE T PO D        sildenafil 100 MG tablet    VIAGRA    12 tablet    Take 1 tablet (100 mg) by mouth daily as needed for erectile dysfunction    Erectile dysfunction, unspecified erectile dysfunction type       tamsulosin 0.4 MG capsule    FLOMAX     Take 1 capsule by mouth daily.        tiZANidine 2 MG tablet    ZANAFLEX    30 tablet    Take 1 tablet (2 mg) by mouth 3 times daily as needed for muscle spasms    Cervicalgia       TYLENOL PO      Take  by mouth as needed.        * Notice:  This list has 6 medication(s) that are the same as other medications prescribed for you. Read the directions carefully, and ask your doctor or other care provider to review them with you.

## 2018-07-26 DIAGNOSIS — E11.69 TYPE 2 DIABETES MELLITUS WITH OTHER SPECIFIED COMPLICATION, WITHOUT LONG-TERM CURRENT USE OF INSULIN (H): ICD-10-CM

## 2018-07-26 NOTE — TELEPHONE ENCOUNTER
"Requested Prescriptions   Pending Prescriptions Disp Refills     glipiZIDE (GLUCOTROL XL) 5 MG 24 hr tablet  Last Written Prescription Date:  05/07/18  Last Fill Quantity: 30,  # refills: 2   Last Office Visit with [facility], [facility] or [facility] prescribing provider:  06/25/18   Future Office Visit:    30 tablet 2     Sig: TK 1 T PO D IN THE MORNING WITH BREAKFAST    Sulfonylurea Agents Failed    7/26/2018  2:29 PM       Failed - Patient has had a Microalbumin in the past 12 mos.    No lab results found.         Failed - Patient has a recent creatinine (normal) within the past 12 mos.    Recent Labs   Lab Test  05/14/18   1634   CR  1.31*            Passed - Blood pressure less than 140/90 in past 6 months    BP Readings from Last 3 Encounters:   06/25/18 138/68   05/14/18 138/80   05/07/18 146/83                Passed - Patient has documented LDL within the past 12 mos.    Recent Labs   Lab Test 11/24/17   LDL  118*            Passed - Patient has documented A1c within the specified period of time.    If HgbA1C is 8 or greater, it needs to be on file within the past 3 months.  If less than 8, must be on file within the past 6 months.     Recent Labs   Lab Test  05/07/18   0951   A1C  7.9*            Passed - Patient is age 18 or older       Passed - Recent (6 mo) or future (30 days) visit within the authorizing provider's specialty    Patient had office visit in the last 6 months or has a visit in the next 30 days with authorizing provider or within the authorizing provider's specialty.  See \"Patient Info\" tab in inbasket, or \"Choose Columns\" in Meds & Orders section of the refill encounter.              "

## 2018-07-31 RX ORDER — GLIPIZIDE 5 MG/1
TABLET, FILM COATED, EXTENDED RELEASE ORAL
Qty: 30 TABLET | Refills: 2 | Status: SHIPPED | OUTPATIENT
Start: 2018-07-31 | End: 2019-01-07

## 2018-09-20 DIAGNOSIS — M54.2 CERVICALGIA: ICD-10-CM

## 2018-09-20 DIAGNOSIS — E11.69 TYPE 2 DIABETES MELLITUS WITH OTHER SPECIFIED COMPLICATION, WITHOUT LONG-TERM CURRENT USE OF INSULIN (H): Primary | ICD-10-CM

## 2018-09-20 RX ORDER — TIZANIDINE 2 MG/1
2 TABLET ORAL 3 TIMES DAILY PRN
Qty: 30 TABLET | Refills: 0 | Status: SHIPPED | OUTPATIENT
Start: 2018-09-20

## 2018-09-20 NOTE — TELEPHONE ENCOUNTER
Requested Prescriptions   Pending Prescriptions Disp Refills     tiZANidine (ZANAFLEX) 2 MG tablet        Last Written Prescription Date:  05/07/18  Last Fill Quantity: 30,   # refills: 0  Last Office Visit: 06/25/18Jolynn  Future Office visit:       Routing refill request to provider for review/approval because:  Drug not on the [facility], [facility] or [facility] refill protocol or controlled substance 30 tablet 0     Sig: Take 1 tablet (2 mg) by mouth 3 times daily as needed for muscle spasms    There is no refill protocol information for this order

## 2018-09-20 NOTE — TELEPHONE ENCOUNTER
Please let patient know (letter is ok), that he is due for fasting lab work and then a f/u appt in November.  Ja Malhotra PA-C

## 2018-09-20 NOTE — LETTER
September 20, 2018          August NUNU Ray  [address]    [place] [place] [address]          Dear August,    At [facility] we care about your health and are committed to providing quality patient care. Regular appointments are a vital part of the care and management of your health and can help prevent many of the complications that can occur.      It has come to our attention that you are due for Fasting labs now and then office appointment in November.  Please call [facility] at [phone] soon to schedule your lab only appointment and office appointment.    If you have transferred care to another clinic please call to inform us so that we do not continue to send you reminder letters.    Sincerely,        [facility] Care Team/allie

## 2018-10-10 ENCOUNTER — OFFICE VISIT (OUTPATIENT)
Dept: FAMILY MEDICINE | Facility: CLINIC | Age: 63
End: 2018-10-10
Payer: MEDICAID

## 2018-10-10 ENCOUNTER — RADIANT APPOINTMENT (OUTPATIENT)
Dept: GENERAL RADIOLOGY | Facility: CLINIC | Age: 63
End: 2018-10-10
Payer: MEDICAID

## 2018-10-10 VITALS
HEART RATE: 91 BPM | DIASTOLIC BLOOD PRESSURE: 78 MMHG | HEIGHT: 65 IN | WEIGHT: 134 LBS | OXYGEN SATURATION: 98 % | BODY MASS INDEX: 22.33 KG/M2 | SYSTOLIC BLOOD PRESSURE: 126 MMHG | TEMPERATURE: 98 F

## 2018-10-10 DIAGNOSIS — R07.89 ATYPICAL CHEST PAIN: ICD-10-CM

## 2018-10-10 DIAGNOSIS — E11.69 TYPE 2 DIABETES MELLITUS WITH OTHER SPECIFIED COMPLICATION, WITHOUT LONG-TERM CURRENT USE OF INSULIN (H): ICD-10-CM

## 2018-10-10 DIAGNOSIS — Z00.00 ROUTINE HISTORY AND PHYSICAL EXAMINATION OF ADULT: ICD-10-CM

## 2018-10-10 DIAGNOSIS — Z13.89 SCREENING FOR DIABETIC PERIPHERAL NEUROPATHY: ICD-10-CM

## 2018-10-10 DIAGNOSIS — E78.5 HYPERLIPIDEMIA, UNSPECIFIED HYPERLIPIDEMIA TYPE: ICD-10-CM

## 2018-10-10 DIAGNOSIS — L98.9 LESION OF SKIN OF FOOT: Primary | ICD-10-CM

## 2018-10-10 DIAGNOSIS — R94.31 ABNORMAL ELECTROCARDIOGRAM: ICD-10-CM

## 2018-10-10 DIAGNOSIS — C61 PC (PROSTATE CANCER) (H): ICD-10-CM

## 2018-10-10 LAB
CHOLEST SERPL-MCNC: 180 MG/DL
CREAT UR-MCNC: 86 MG/DL
HBA1C MFR BLD: 7.3 % (ref 0–5.6)
HDLC SERPL-MCNC: 53 MG/DL
LDLC SERPL CALC-MCNC: 93 MG/DL
MICROALBUMIN UR-MCNC: 32 MG/L
MICROALBUMIN/CREAT UR: 36.95 MG/G CR (ref 0–17)
NONHDLC SERPL-MCNC: 127 MG/DL
PSA SERPL-ACNC: 0.02 UG/L (ref 0–4)
TRIGL SERPL-MCNC: 169 MG/DL

## 2018-10-10 PROCEDURE — 99396 PREV VISIT EST AGE 40-64: CPT | Performed by: PHYSICIAN ASSISTANT

## 2018-10-10 PROCEDURE — 80061 LIPID PANEL: CPT | Performed by: PHYSICIAN ASSISTANT

## 2018-10-10 PROCEDURE — 83036 HEMOGLOBIN GLYCOSYLATED A1C: CPT | Performed by: PHYSICIAN ASSISTANT

## 2018-10-10 PROCEDURE — 93000 ELECTROCARDIOGRAM COMPLETE: CPT | Performed by: PHYSICIAN ASSISTANT

## 2018-10-10 PROCEDURE — 84153 ASSAY OF PSA TOTAL: CPT | Performed by: PHYSICIAN ASSISTANT

## 2018-10-10 PROCEDURE — 82043 UR ALBUMIN QUANTITATIVE: CPT | Performed by: PHYSICIAN ASSISTANT

## 2018-10-10 PROCEDURE — 71046 X-RAY EXAM CHEST 2 VIEWS: CPT | Mod: FY

## 2018-10-10 PROCEDURE — 36415 COLL VENOUS BLD VENIPUNCTURE: CPT | Performed by: PHYSICIAN ASSISTANT

## 2018-10-10 PROCEDURE — 99213 OFFICE O/P EST LOW 20 MIN: CPT | Mod: 25 | Performed by: PHYSICIAN ASSISTANT

## 2018-10-10 ASSESSMENT — PAIN SCALES - GENERAL: PAINLEVEL: SEVERE PAIN (7)

## 2018-10-10 NOTE — PROGRESS NOTES
SUBJECTIVE:   CC: August Ray is an 62 year old male who presents for preventative health visit.     Healthy Habits:    Do you get at least three servings of calcium containing foods daily (dairy, green leafy vegetables, etc.)? yes    Amount of exercise or daily activities, outside of work: 1-2 day(s) per week    Problems taking medications regularly No    Medication side effects: No    Have you had an eye exam in the past two years? yes    Do you see a dentist twice per year? no    Do you have sleep apnea, excessive snoring or daytime drowsiness?no      DM-  Generally good, still spike when eats carbs but not as bad since starting the farxica which he is tolerating well.      Does report since  last having painful hyperkeratosis on both 1st and 3rd digit.     Is due for routine prostate cancer surveillance.     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  No pain recently, no cough. Did used to smoke in distant past.      Today's PHQ-2 Score:   PHQ-2 ( 1999 Pfizer) 6/25/2018 5/14/2018   Q1: Little interest or pleasure in doing things 0 0   Q2: Feeling down, depressed or hopeless 0 0   PHQ-2 Score 0 0    Abuse: Current or Past(Physical, Sexual or Emotional)- No  Do you feel safe in your environment - Yes    Social History   Substance Use Topics     Smoking status: Former Smoker     Quit date: 11/26/1991     Smokeless tobacco: Never Used     Alcohol use Yes      If you drink alcohol do you typically have >3 drinks per day or >7 drinks per week? No                      Last PSA:   PSA   Date Value Ref Range Status   10/17/2017 0.03 0 - 4 ug/L Final     Comment:     Assay Method:  Chemiluminescence using Siemens Vista analyzer       Reviewed orders with patient. Reviewed health  "maintenance and updated orders accordingly - Yes       Reviewed and updated as needed this visit by clinical staff  Tobacco  Allergies  Meds         Reviewed and updated as needed this visit by Provider         ROS:  CONSTITUTIONAL: NEGATIVE for fever, chills, change in weight  INTEGUMENTARY/SKIN: NEGATIVE for worrisome rashes, moles or lesions  EYES: NEGATIVE for vision changes or irritation  ENT: NEGATIVE for ear, mouth and throat problems  RESP: NEGATIVE for significant cough or SOB  CV: + chest pain as above  GI: NEGATIVE for nausea, abdominal pain, heartburn, or change in bowel habits   male:hx prostate CA  MUSCULOSKELETAL: NEGATIVE for significant arthralgias or myalgia  NEURO: NEGATIVE for weakness, dizziness or paresthesias  PSYCHIATRIC: NEGATIVE for changes in mood or affect    OBJECTIVE:   /78 (BP Location: Left arm, Patient Position: Chair, Cuff Size: Adult Regular)  Pulse 91  Temp 98  F (36.7  C) (Oral)  Ht 5' 4.5\" (1.638 m)  Wt 134 lb (60.8 kg)  SpO2 98%  BMI 22.65 kg/m2  EXAM:  GENERAL: healthy, alert and no distress  EYES: Eyes grossly normal to inspection, PERRL and conjunctivae and sclerae normal  HENT: ear canals and TM's normal, nose and mouth without ulcers or lesions  NECK: no adenopathy, no asymmetry, masses, or scars and thyroid normal to palpation  RESP: lungs clear to auscultation - no rales, rhonchi or wheezes  CV: regular rate and rhythm, normal S1 S2, no S3 or S4, no murmur, click or rub, no peripheral edema and peripheral pulses strong  ABDOMEN: soft, nontender, no hepatosplenomegaly, no masses and bowel sounds normal  MS: no gross musculoskeletal defects noted, no edema  SKIN:b/l lateraql 1st and dorsal 3rd toes with 10x2 mm hyperkeratotic lesions.  Cap refill intact, light sensation and vibratin sensation intact, DP pulses b/l 2+, NEURO: Normal strength and tone, mentation intact and speech normal  PSYCH: mentation appears normal, affect normal/bright        Diagnostic " "Test Results:  A1c 7.3 (improved)  Xray - My independent read of XR is WNL    EKG - sinus, stable anterseptal old infacrt, no changes from prev    ASSESSMENT/PLAN:       ICD-10-CM    1. Lesion of skin of foot L98.9 PODIATRY/FOOT & ANKLE SURGERY REFERRAL   2. Routine history and physical examination of adult Z00.00    3. Atypical chest pain R07.89 XR Chest 2 Views     CARDIO  ADULT REFERRAL   4. Screening for diabetic peripheral neuropathy Z13.89 FOOT EXAM  NO CHARGE [82450.114]   5. Abnormal electrocardiogram R94.31 EKG 12-lead complete w/read - Clinics     CARDIO  ADULT REFERRAL   6. Type 2 diabetes mellitus with other specified complication, without long-term current use of insulin (H) E11.69 HEMOGLOBIN A1C     Albumin Random Urine Quantitative with Creat Ratio   7. Hyperlipidemia, unspecified hyperlipidemia type E78.5 Lipid Profile   8. PC (prostate cancer) (H) C61 PSA, screen       COUNSELING:  Reviewed preventive health counseling, as reflected in patient instructions       Prostate cancer screening    BP Readings from Last 1 Encounters:   10/10/18 126/78     Estimated body mass index is 22.65 kg/(m^2) as calculated from the following:    Height as of this encounter: 5' 4.5\" (1.638 m).    Weight as of this encounter: 134 lb (60.8 kg).            reports that he quit smoking about 26 years ago. He has never used smokeless tobacco.      Counseling Resources:  ATP IV Guidelines  Pooled Cohorts Equation Calculator  FRAX Risk Assessment  ICSI Preventive Guidelines  Dietary Guidelines for Americans, 2010  USDA's MyPlate  ASA Prophylaxis  Lung CA Screening    JESSI Latif  [facility]  "

## 2018-10-10 NOTE — PATIENT INSTRUCTIONS
Preventive Health Recommendations  Male Ages 50 - 64    Yearly exam:             See your health care provider every year in order to  o   Review health changes.   o   Discuss preventive care.    o   Review your medicines if your doctor has prescribed any.     Have a cholesterol test every 5 years, or more frequently if you are at risk for high cholesterol/heart disease.     Have a diabetes test (fasting glucose) every three years. If you are at risk for diabetes, you should have this test more often.     Have a colonoscopy at age 50, or have a yearly FIT test (stool test). These exams will check for colon cancer.      Talk with your health care provider about whether or not a prostate cancer screening test (PSA) is right for you.    You should be tested each year for STDs (sexually transmitted diseases), if you re at risk.     Shots: Get a flu shot each year. Get a tetanus shot every 10 years.     Nutrition:    Eat at least 5 servings of fruits and vegetables daily.     Eat whole-grain bread, whole-wheat pasta and brown rice instead of white grains and rice.     Get adequate Calcium and Vitamin D.     Lifestyle    Exercise for at least 150 minutes a week (30 minutes a day, 5 days a week). This will help you control your weight and prevent disease.     Limit alcohol to one drink per day.     No smoking.     Wear sunscreen to prevent skin cancer.     See your dentist every six months for an exam and cleaning.     See your eye doctor every 1 to 2 years.     *CHEST PAIN, UNCERTAIN CAUSE    Based on your exam today, the exact cause of your chest pain is not certain. Your condition does not seem serious at this time, and your pain does not appear to be coming from your heart. However, sometimes the signs of a serious problem take more time to appear. Therefore, watch for the warning signs listed below.  HOME CARE:    1. Rest today and avoid strenuous activity.  2. Take any prescribed medicine as directed.  FOLLOW UP  with your doctor in 1-3 days.   GET PROMPT MEDICAL ATTENTION if any of the following occur:    A change in the type of pain: if it feels different, becomes more severe, lasts longer, or begins to spread into your shoulder, arm, neck, jaw or back    Shortness of breath or increased pain with breathing    Weakness, dizziness, or fainting    Cough with blood or dark colored sputum (phlegm)    Fever over 101  F (38.3  C)    Swelling, pain or redness in one leg    6294-4661 [employer]. [address] [place], [place] [id number]. All rights reserved. This information is not intended as a substitute for professional medical care. Always follow your healthcare professional's instructions.  This information has been modified by your health care provider with permission from the publisher.

## 2018-10-10 NOTE — MR AVS SNAPSHOT
After Visit Summary   10/10/2018    August Ray    MRN: [id number]           Patient Information     Date Of Birth          1955        Visit Information        Provider Department      10/10/2018 12:00 PM Ibrahima Malhotra PA [facility]        Today's Diagnoses     Lesion of skin of foot    -  1    Routine history and physical examination of adult        Atypical chest pain        Screening for diabetic peripheral neuropathy        Abnormal electrocardiogram        Type 2 diabetes mellitus with other specified complication, without long-term current use of insulin (H)        Hyperlipidemia, unspecified hyperlipidemia type        PC (prostate cancer) (H)          Care Instructions      Preventive Health Recommendations  Male Ages 50 - 64    Yearly exam:             See your health care provider every year in order to  o   Review health changes.   o   Discuss preventive care.    o   Review your medicines if your doctor has prescribed any.     Have a cholesterol test every 5 years, or more frequently if you are at risk for high cholesterol/heart disease.     Have a diabetes test (fasting glucose) every three years. If you are at risk for diabetes, you should have this test more often.     Have a colonoscopy at age 50, or have a yearly FIT test (stool test). These exams will check for colon cancer.      Talk with your health care provider about whether or not a prostate cancer screening test (PSA) is right for you.    You should be tested each year for STDs (sexually transmitted diseases), if you re at risk.     Shots: Get a flu shot each year. Get a tetanus shot every 10 years.     Nutrition:    Eat at least 5 servings of fruits and vegetables daily.     Eat whole-grain bread, whole-wheat pasta and brown rice instead of white grains and rice.     Get adequate Calcium and Vitamin D.     Lifestyle    Exercise for at least 150 minutes a week (30 minutes a day, 5  days a week). This will help you control your weight and prevent disease.     Limit alcohol to one drink per day.     No smoking.     Wear sunscreen to prevent skin cancer.     See your dentist every six months for an exam and cleaning.     See your eye doctor every 1 to 2 years.     *CHEST PAIN, UNCERTAIN CAUSE    Based on your exam today, the exact cause of your chest pain is not certain. Your condition does not seem serious at this time, and your pain does not appear to be coming from your heart. However, sometimes the signs of a serious problem take more time to appear. Therefore, watch for the warning signs listed below.  HOME CARE:    1. Rest today and avoid strenuous activity.  2. Take any prescribed medicine as directed.  FOLLOW UP with your doctor in 1-3 days.   GET PROMPT MEDICAL ATTENTION if any of the following occur:    A change in the type of pain: if it feels different, becomes more severe, lasts longer, or begins to spread into your shoulder, arm, neck, jaw or back    Shortness of breath or increased pain with breathing    Weakness, dizziness, or fainting    Cough with blood or dark colored sputum (phlegm)    Fever over 101  F (38.3  C)    Swelling, pain or redness in one leg    7465-6002 [employer]. [address] [place] [address]. All rights reserved. This information is not intended as a substitute for professional medical care. Always follow your healthcare professional's instructions.  This information has been modified by your health care provider with permission from the publisher.            Follow-ups after your visit        Additional Services     CARDIO  ADULT REFERRAL       [facility] is referring you to Cardiology Services.       The  Representative will assist you in the coordination of your Cardiology care as prescribed by your physician.    The  Representative will call you within 24 hours to help schedule your  appointment, or you may contact the [facility] Representative at: [phone].         Type of Referral: New Cardiology Referral            Timeframe requested: within 4 weeks       Coverage of these services is subject to the terms and limitations of your health insurance plan.  Please call member services at your health plan with any benefit or coverage questions.      If X-rays, CT or MRI's have been performed, please contact the facility where they were done to arrange for , prior to your scheduled appointment.  Please bring this referral request to your appointment and present it to your specialist.            PODIATRY/FOOT & ANKLE SURGERY REFERRAL       Your provider has referred you to: [facility]: [facility] [place] [phone]   [email or web address]/    Please be aware that coverage of these services is subject to the terms and limitations of your health insurance plan.  Call member services at your health plan with any benefit or coverage questions.      Please bring the following to your appointment:  >>   Any x-rays, CTs or MRIs which have been performed.  Contact the facility where they were done to arrange for  prior to your scheduled appointment.    >>   List of current medications   >>   This referral request   >>   Any documents/labs given to you for this referral                  Follow-up notes from your care team     Return in about 4 weeks (around 11/7/2018) for Specialist Follow-up.      Who to contact     If you have questions or need follow up information about today's clinic visit or your schedule please contact [facility] directly at [phone].  Normal or non-critical lab and imaging results will be communicated to you by MyChart, letter or phone within 4 business days after the clinic has received the results. If you do not hear from us within 7 days, please contact the clinic through MyChart or phone. If  "you have a critical or abnormal lab result, we will notify you by phone as soon as possible.  Submit refill requests through [employer] or call your pharmacy and they will forward the refill request to us. Please allow 3 business days for your refill to be completed.          Additional Information About Your Visit        i[employer]hart Information     [employer] lets you send messages to your doctor, view your test results, renew your prescriptions, schedule appointments and more. To sign up, go to [email or web address] . Click on \"Log in\" on the left side of the screen, which will take you to the Welcome page. Then click on \"Sign up Now\" on the right side of the page.     You will be asked to enter the access code listed below, as well as some personal information. Please follow the directions to create your username and password.     Your access code is: [email or web address]  Expires: 2019  1:11 PM     Your access code will  in 90 days. If you need help or a new code, please call your [place] clinic or [phone].        Care EveryWhere ID     This is your Care EveryWhere ID. This could be used by other organizations to access your [facility] [place] medical records  [id number]        Your Vitals Were     Pulse Temperature Height Pulse Oximetry BMI (Body Mass Index)       91 98  F (36.7  C) (Oral) 5' 4.5\" (1.638 m) 98% 22.65 kg/m2        Blood Pressure from Last 3 Encounters:   10/10/18 126/78   18 138/68   18 138/80    Weight from Last 3 Encounters:   10/10/18 134 lb (60.8 kg)   18 134 lb 11.2 oz (61.1 kg)   18 136 lb (61.7 kg)              We Performed the Following     Albumin Random Urine Quantitative with Creat Ratio     CARDIO  ADULT REFERRAL     EKG 12-lead complete w/read - Clinics     FOOT EXAM  NO CHARGE [07050.114]     HEMOGLOBIN A1C     Lipid Profile     PODIATRY/FOOT & ANKLE SURGERY REFERRAL     PSA, screen        Primary Care Provider Office Phone # Fax #    Ibrahima Guerrero " JESSI Malhotra [phone]       [address] [facility] [address]        Equal Access to Services     KIRK CAGLE : Hadii aad ku hadantoine Amato, wakjda luqwhitney, christiano kakarlada carolynn, mel wangwillard elida. So [facility] [phone].    ATENCIÓN: Si habla español, tiene a mckenzie disposición servicios gratuitos de asistencia lingüística. Llame al [phone].    We comply with applicable federal civil rights laws and [place] laws. We do not discriminate on the basis of race, color, national origin, age, disability, sex, sexual orientation, or gender identity.            Thank you!     Thank you for choosing [facility]  for your care. Our goal is always to provide you with excellent care. Hearing back from our patients is one way we can continue to improve our services. Please take a few minutes to complete the written survey that you may receive in the mail after your visit with us. Thank you!             Your Updated Medication List - Protect others around you: Learn how to safely use, store and throw away your medicines at www.disposemymeds.org.          This list is accurate as of 10/10/18  1:11 PM.  Always use your most recent med list.                   Brand Name Dispense Instructions for use Diagnosis    Adjustable Lancing Device Misc           aspirin 81 MG tablet      Take 1 tablet by mouth daily.        ASSURE COMFORT LANCETS 30G Misc           blood glucose lancets standard    no brand specified    100 each    Use to test blood sugar 1-4 times daily or as directed.    Type 2 diabetes mellitus with other specified complication, without long-term current use of insulin (H)       CARESENS CONTROL A Soln           * CARESENS N GLUCOSE SYSTEM Gris           * blood glucose monitoring meter device kit    no brand specified    1 kit    Use to test blood sugar 1-4 times daily or as directed. Per insurance: One touch    Type 2 diabetes mellitus with other  specified complication, without long-term current use of insulin (H)       * CARESENS N GLUCOSE TEST test strip   Generic drug:  blood glucose monitoring           * blood glucose monitoring test strip    no brand specified    100 strip    Use to test blood sugars 1-4 times daily or as directed. Per insurance    Type 2 diabetes mellitus with other specified complication, without long-term current use of insulin (H)       CLARITIN PO      Take  by mouth as needed.        * dapagliflozin 5 MG Tabs tablet    FARXIGA    30 tablet    Take 1 tablet (5 mg) by mouth every morning    Type 2 diabetes mellitus with other specified complication, without long-term current use of insulin (H)       * dapagliflozin 10 MG Tabs tablet    FARXIGA    30 tablet    Take 1 tablet (10 mg) by mouth daily    Type 2 diabetes mellitus with other specified complication, without long-term current use of insulin (H)       glipiZIDE 5 MG 24 hr tablet    GLUCOTROL XL    30 tablet    TK 1 T PO D IN THE MORNING WITH BREAKFAST    Type 2 diabetes mellitus with other specified complication, without long-term current use of insulin (H)       indomethacin 50 MG capsule    INDOCIN          JARDIANCE 10 MG Tabs tablet   Generic drug:  empagliflozin      Take 10 mg by mouth daily        lisinopril-hydrochlorothiazide 20-25 MG per tablet    PRINZIDE/ZESTORETIC     Take 1 tablet by mouth daily.        ROGERS MULTI MEN PO      Take 1 tablet by mouth daily.        metFORMIN 1000 MG tablet    GLUCOPHAGE    60 tablet    Take 1 tablet (1,000 mg) by mouth 2 times daily (with meals) PT has been changed to a different medication - cannot remember name -    Type 2 diabetes mellitus with other specified complication, without long-term current use of insulin (H)       NIASPAN 500 MG CR tablet   Generic drug:  niacin      Take 1 tablet by mouth daily.        oxybutynin 10 MG 24 hr tablet    DITROPAN XL    90 tablet    Take 1 tablet (10 mg) by mouth daily    Lower urinary  tract symptoms (LUTS)       pravastatin 40 MG tablet    PRAVACHOL     TK ONE T PO D        sildenafil 100 MG tablet    VIAGRA    12 tablet    Take 1 tablet (100 mg) by mouth daily as needed for erectile dysfunction    Erectile dysfunction, unspecified erectile dysfunction type       tamsulosin 0.4 MG capsule    FLOMAX     Take 1 capsule by mouth daily.        tiZANidine 2 MG tablet    ZANAFLEX    30 tablet    Take 1 tablet (2 mg) by mouth 3 times daily as needed for muscle spasms    Cervicalgia       TYLENOL PO      Take  by mouth as needed.        * Notice:  This list has 6 medication(s) that are the same as other medications prescribed for you. Read the directions carefully, and ask your doctor or other care provider to review them with you.

## 2018-10-11 NOTE — PROGRESS NOTES
Please send letter if doesn't check mychart.  Ja Malhotra PA-C    MrMatthew Ray,    All of your labs were normal for you except there was a small amount of protein in your urine.  This is from the diabetes and high blood pressure.  The lisinopril you are on will help with this.  Your cholesterol was mostly improved- one type of cholesterol was a little elevated.  Please continue the pravastatin and we will recheck this next year.    Please contact the clinic if you have additional questions or if symptoms persist.  Thank you.    Sincerely,    Ibrahima Malhotra

## 2018-10-27 ENCOUNTER — PRE VISIT (OUTPATIENT)
Dept: UROLOGY | Facility: CLINIC | Age: 63
End: 2018-10-27

## 2018-10-27 NOTE — TELEPHONE ENCOUNTER
Patient with history of ED and CAP coming in for PSA review. Recent PSA in system. Patient chart reviewed, no need for call, all records available and ready for appointment.

## 2018-11-02 ENCOUNTER — OFFICE VISIT (OUTPATIENT)
Dept: UROLOGY | Facility: CLINIC | Age: 63
End: 2018-11-02
Payer: COMMERCIAL

## 2018-11-02 VITALS
HEART RATE: 99 BPM | SYSTOLIC BLOOD PRESSURE: 148 MMHG | BODY MASS INDEX: 22.33 KG/M2 | HEIGHT: 65 IN | DIASTOLIC BLOOD PRESSURE: 80 MMHG | WEIGHT: 134 LBS

## 2018-11-02 DIAGNOSIS — R68.82 DECREASED LIBIDO: ICD-10-CM

## 2018-11-02 DIAGNOSIS — N52.9 ERECTILE DYSFUNCTION, UNSPECIFIED ERECTILE DYSFUNCTION TYPE: Primary | ICD-10-CM

## 2018-11-02 DIAGNOSIS — C61 PROSTATE CANCER (H): ICD-10-CM

## 2018-11-02 RX ORDER — SILDENAFIL 100 MG/1
50-100 TABLET, FILM COATED ORAL DAILY PRN
Qty: 12 TABLET | Refills: 12 | Status: SHIPPED | OUTPATIENT
Start: 2018-11-02 | End: 2018-11-02

## 2018-11-02 RX ORDER — SILDENAFIL 100 MG/1
100 TABLET, FILM COATED ORAL DAILY PRN
Qty: 16 TABLET | Refills: 11 | Status: SHIPPED | OUTPATIENT
Start: 2018-11-02 | End: 2019-05-06

## 2018-11-02 RX ORDER — SILDENAFIL 100 MG/1
100 TABLET, FILM COATED ORAL DAILY PRN
Qty: 16 TABLET | Refills: 11 | Status: SHIPPED | OUTPATIENT
Start: 2018-11-02 | End: 2018-11-02

## 2018-11-02 ASSESSMENT — PAIN SCALES - GENERAL: PAINLEVEL: NO PAIN (0)

## 2018-11-02 NOTE — PROGRESS NOTES
"Mr. Ray is a 62 year old male with urologic history of prostate cancer Silver 6= 3+3 s/p  brachytherapy in 2007 who presents today for follow up for PSA. Patient has previously been on Androgel in 2013 which has since been discontinued. His PSA trend is below. Viagra prescribed at his last visit with me in March 2017.     He reports that sometimes he can have normal intercourse without Viagra.  Medications do help, though.  He struggles with cost.       He complains of low libido and lack of ejaculation.  Testosterone was normal 10/2016:   378ng/dL.    Lab Results   Component Value Date    PSA 0.02 10/10/2018    PSA 0.03 10/17/2017    PSA 0.02 08/30/2016    PSA 0.04 09/21/2015    PSA 0.06 08/14/2015    PSA 0.05 03/09/2015    PSA  03/06/2014     <0.07  PSA results are about 7% lower than our prior method due to a methodology change   on August 30, 2011.    PSA  08/23/2013     <0.07  PSA results are about 7% lower than our prior method due to a methodology change   on August 30, 2011.    PSA 0.09 04/26/2013    PSA 0.10 02/04/2013     /80  Pulse 99  Ht 1.638 m (5' 4.5\")  Wt 60.8 kg (134 lb)  BMI 22.65 kg/m2    General: Alert, oriented, nad  Eyes: anicteric, EOMI.  Pulse: regular  Resps: normal, non-labored.  Abdomen:  nondistended.   exam deferred.     Current medications:    A-  History of prostate cancer, PSA low and stable.  Decreased libido with normal testosterone.  Anejaculation    Plan-  -Continue yearly PSA checks.    15min visit, over 50% face to face in counseling/discussion of ED, CAP, T level.       Jessica FLORENCE      "

## 2018-11-02 NOTE — MR AVS SNAPSHOT
"              After Visit Summary   2018    August Ray    MRN: [id number]           Patient Information     Date Of Birth          1955        Visit Information        Provider Department      2018 10:40 AM Mao Molina MD [facility] Urology and [facility] Pro[facility] Urologic C[facility]        Today's Diagnoses     Erectile dysfunction, unspecified erectile dysfunction type    -  1    Decreased libido        Prostate cancer (H)           Follow-ups after your visit        Who to contact     Please call your clinic at [phone] to:    Ask questions about your health    Make or cancel appointments    Discuss your medicines    Learn about your test results    Speak to your doctor            Additional Information About Your Visit        MyChart Information     i3 membranet is an electronic gateway that provides easy, online access to your medical records. With [employer], you can request a clinic appointment, read your test results, renew a prescription or communicate with your care team.     To sign up for i3 membranet visit the website at [email or web address]   You will be asked to enter the access code listed below, as well as some personal information. Please follow the directions to create your username and password.     Your access code is: [email or web address]  Expires: 2019  1:11 PM     Your access code will  in 90 days. If you need help or a new code, please contact your [facility] or call [phone] for assistance.        Care EveryWhere ID     This is your Care EveryWhere ID. This could be used by other organizations to access your [place] medical records  [id number]        Your Vitals Were     Pulse Height BMI (Body Mass Index)             99 1.638 m (5' 4.5\") 22.65 kg/m2          Blood Pressure from Last 3 Encounters:   18 148/80   10/10/18 126/78   18 138/68    Weight from Last 3 Encounters:   18 60.8 kg (134 lb)   10/10/18 " 60.8 kg (134 lb)   06/25/18 61.1 kg (134 lb 11.2 oz)              Today, you had the following     No orders found for display         Today's Medication Changes          These changes are accurate as of 11/2/18 11:00 AM.  If you have any questions, ask your nurse or doctor.               These medicines have changed or have updated prescriptions.        Dose/Directions    * sildenafil 100 MG tablet   Commonly known as:  VIAGRA   This may have changed:  Another medication with the same name was added. Make sure you understand how and when to take each.   Used for:  Erectile dysfunction, unspecified erectile dysfunction type   Changed by:  Mao Molina MD        Dose:  100 mg   Take 1 tablet (100 mg) by mouth daily as needed for erectile dysfunction   Quantity:  12 tablet   Refills:  11       * sildenafil 100 MG tablet   Commonly known as:  VIAGRA   This may have changed:  You were already taking a medication with the same name, and this prescription was added. Make sure you understand how and when to take each.   Used for:  Erectile dysfunction, unspecified erectile dysfunction type, Decreased libido, Prostate cancer (H)   Changed by:  Mao Molina MD        Dose:  100 mg   Take 1 tablet (100 mg) by mouth daily as needed (take 1 hour before intercourse)   Quantity:  16 tablet   Refills:  11       * Notice:  This list has 2 medication(s) that are the same as other medications prescribed for you. Read the directions carefully, and ask your doctor or other care provider to review them with you.         Where to get your medicines      Some of these will need a paper prescription and others can be bought over the counter.  Ask your nurse if you have questions.     Bring a paper prescription for each of these medications     sildenafil 100 MG tablet                Primary Care Provider Office Phone # Fax #    JESSI Latif [phone] [address] [facility]  [id number]        Equal Access to Services     WES TSERING : Hadii aleksandra tapia dom Amato, wakjda luqwhitney, qameg vernellkarlamel rollins. So [facility] [phone].    ATENCIÓN: Si habla español, tiene a mckenzie disposición servicios gratuitos de asistencia lingüística. Antonioame al [phone].    We comply with applicable federal civil rights laws and [place] laws. We do not discriminate on the basis of race, color, national origin, age, disability, sex, sexual orientation, or gender identity.            Thank you!     Thank you for choosing [facility] UROLOGY AND [facility] FOR PROSTATE AND UROLOGIC CANCERS  for your care. Our goal is always to provide you with excellent care. Hearing back from our patients is one way we can continue to improve our services. Please take a few minutes to complete the written survey that you may receive in the mail after your visit with us. Thank you!             Your Updated Medication List - Protect others around you: Learn how to safely use, store and throw away your medicines at www.disposemymeds.org.          This list is accurate as of 11/2/18 11:00 AM.  Always use your most recent med list.                   Brand Name Dispense Instructions for use Diagnosis    Adjustable Lancing Device Misc           aspirin 81 MG tablet      Take 1 tablet by mouth daily.        ASSURE COMFORT LANCETS 30G Misc           blood glucose lancets standard    no brand specified    100 each    Use to test blood sugar 1-4 times daily or as directed.    Type 2 diabetes mellitus with other specified complication, without long-term current use of insulin (H)       CARESENS CONTROL A Soln           * CARESENS N GLUCOSE SYSTEM Gris           * blood glucose monitoring meter device kit    no brand specified    1 kit    Use to test blood sugar 1-4 times daily or as directed. Per insurance: One touch    Type 2 diabetes mellitus with other specified complication, without long-term current  use of insulin (H)       * CARESENS N GLUCOSE TEST test strip   Generic drug:  blood glucose monitoring           * blood glucose monitoring test strip    no brand specified    100 strip    Use to test blood sugars 1-4 times daily or as directed. Per insurance    Type 2 diabetes mellitus with other specified complication, without long-term current use of insulin (H)       CLARITIN PO      Take  by mouth as needed.        * dapagliflozin 5 MG Tabs tablet    FARXIGA    30 tablet    Take 1 tablet (5 mg) by mouth every morning    Type 2 diabetes mellitus with other specified complication, without long-term current use of insulin (H)       * dapagliflozin 10 MG Tabs tablet    FARXIGA    30 tablet    Take 1 tablet (10 mg) by mouth daily    Type 2 diabetes mellitus with other specified complication, without long-term current use of insulin (H)       glipiZIDE 5 MG 24 hr tablet    GLUCOTROL XL    30 tablet    TK 1 T PO D IN THE MORNING WITH BREAKFAST    Type 2 diabetes mellitus with other specified complication, without long-term current use of insulin (H)       indomethacin 50 MG capsule    INDOCIN          JARDIANCE 10 MG Tabs tablet   Generic drug:  empagliflozin      Take 10 mg by mouth daily        lisinopril-hydrochlorothiazide 20-25 MG per tablet    PRINZIDE/ZESTORETIC     Take 1 tablet by mouth daily.        ROGERS MULTI MEN PO      Take 1 tablet by mouth daily.        metFORMIN 1000 MG tablet    GLUCOPHAGE    60 tablet    Take 1 tablet (1,000 mg) by mouth 2 times daily (with meals) PT has been changed to a different medication - cannot remember name -    Type 2 diabetes mellitus with other specified complication, without long-term current use of insulin (H)       NIASPAN 500 MG CR tablet   Generic drug:  niacin      Take 1 tablet by mouth daily.        oxybutynin 10 MG 24 hr tablet    DITROPAN XL    90 tablet    Take 1 tablet (10 mg) by mouth daily    Lower urinary tract symptoms (LUTS)       pravastatin 40 MG tablet     PRAVACHOL     TK ONE T PO D        * sildenafil 100 MG tablet    VIAGRA    12 tablet    Take 1 tablet (100 mg) by mouth daily as needed for erectile dysfunction    Erectile dysfunction, unspecified erectile dysfunction type       * sildenafil 100 MG tablet    VIAGRA    16 tablet    Take 1 tablet (100 mg) by mouth daily as needed (take 1 hour before intercourse)    Erectile dysfunction, unspecified erectile dysfunction type, Decreased libido, Prostate cancer (H)       tamsulosin 0.4 MG capsule    FLOMAX     Take 1 capsule by mouth daily.        tiZANidine 2 MG tablet    ZANAFLEX    30 tablet    Take 1 tablet (2 mg) by mouth 3 times daily as needed for muscle spasms    Cervicalgia       TYLENOL PO      Take  by mouth as needed.        * Notice:  This list has 8 medication(s) that are the same as other medications prescribed for you. Read the directions carefully, and ask your doctor or other care provider to review them with you.

## 2018-11-02 NOTE — NURSING NOTE
"Chief Complaint   Patient presents with     Follow Up For     PSA review       Blood pressure 148/80, pulse 99, height 1.638 m (5' 4.5\"), weight 60.8 kg (134 lb). Body mass index is 22.65 kg/(m^2).    Patient Active Problem List   Diagnosis     Erectile dysfunction     PC (prostate cancer) (H)     Decreased libido     HTN (hypertension)     Hyperlipidemia     Orthostatic hypotension     Rotator cuff tear arthropathy of right shoulder     Type 2 diabetes mellitus with other specified complication, without long-term current use of insulin (H)     Gout     Allergic rhinitis     Insomnia     Lower urinary tract symptoms (LUTS)     Cervicalgia       Allergies   Allergen Reactions     Hydrocodone-Acetaminophen      syncope     Insulin Glargine      Itchy rash     No Clinical Screening - See Comments      Intolerance to this     Oxycodone      States he passed out/fell after taking it       Current Outpatient Prescriptions   Medication Sig Dispense Refill     Acetaminophen (TYLENOL PO) Take  by mouth as needed.       aspirin 81 MG tablet Take 1 tablet by mouth daily.       ASSURE COMFORT LANCETS 30G MISC        blood glucose (NO BRAND SPECIFIED) lancets standard Use to test blood sugar 1-4 times daily or as directed. 100 each 3     Blood Glucose Calibration (CARESENS CONTROL A) SOLN        blood glucose monitoring (NO BRAND SPECIFIED) meter device kit Use to test blood sugar 1-4 times daily or as directed. Per insurance: One touch 1 kit 0     blood glucose monitoring (NO BRAND SPECIFIED) test strip Use to test blood sugars 1-4 times daily or as directed. Per insurance 100 strip 3     Blood Glucose Monitoring Suppl (CARESENS N GLUCOSE SYSTEM) MARIAELENA        CARESENS N GLUCOSE TEST test strip        dapagliflozin (FARXIGA) 10 MG TABS tablet Take 1 tablet (10 mg) by mouth daily 30 tablet 2     dapagliflozin (FARXIGA) 5 MG TABS tablet Take 1 tablet (5 mg) by mouth every morning 30 tablet 2     glipiZIDE (GLUCOTROL XL) 5 MG 24 hr " tablet TK 1 T PO D IN THE MORNING WITH BREAKFAST 30 tablet 2     indomethacin (INDOCIN) 50 MG capsule   3     JARDIANCE 10 MG TABS tablet Take 10 mg by mouth daily  2     Lancet Devices (ADJUSTABLE LANCING DEVICE) MISC        lisinopril-hydrochlorothiazide (PRINZIDE,ZESTORETIC) 20-25 MG per tablet Take 1 tablet by mouth daily.       Loratadine (CLARITIN PO) Take  by mouth as needed.       metFORMIN (GLUCOPHAGE) 1000 MG tablet Take 1 tablet (1,000 mg) by mouth 2 times daily (with meals) PT has been changed to a different medication - cannot remember name - 60 tablet 2     Multiple Vitamins-Minerals (ROGERS MULTI MEN PO) Take 1 tablet by mouth daily.       niacin (NIASPAN) 500 MG CR tablet Take 1 tablet by mouth daily.       oxybutynin (DITROPAN XL) 10 MG 24 hr tablet Take 1 tablet (10 mg) by mouth daily 90 tablet 0     pravastatin (PRAVACHOL) 40 MG tablet TK ONE T PO D  0     sildenafil (VIAGRA) 100 MG cap/tab Take 1 tablet (100 mg) by mouth daily as needed for erectile dysfunction 12 tablet 11     tamsulosin (FLOMAX) 0.4 MG 24 hr capsule Take 1 capsule by mouth daily.       tiZANidine (ZANAFLEX) 2 MG tablet Take 1 tablet (2 mg) by mouth 3 times daily as needed for muscle spasms (Patient not taking: Reported on 10/10/2018) 30 tablet 0       Social History   Substance Use Topics     Smoking status: Former Smoker     Quit date: 11/26/1991     Smokeless tobacco: Never Used     Alcohol use Yes       Mimi Echols LPN  11/2/2018  10:46 AM

## 2018-11-02 NOTE — LETTER
"11/2/2018       RE: August Ray  [address]  [address]  [place] [facility] [address]     Dear Colleague,    Thank you for referring your patient, August Ray, to the [facility] UROLOGY AND INST FOR PROSTATE AND UROLOGIC CANCERS at [facility]. Please see a copy of my visit note below.    Mr. Ray is a 62 year old male with urologic history of prostate cancer Rose 6= 3+3 s/p  brachytherapy in 2007 who presents today for follow up for PSA. Patient has previously been on Androgel in 2013 which has since been discontinued. His PSA trend is below. Viagra prescribed at his last visit with me in March 2017.     He reports that sometimes he can have normal intercourse without Viagra.  Medications do help, though.  He struggles with cost.       He complains of low libido and lack of ejaculation.  Testosterone was normal 10/2016:   378ng/dL.    Lab Results   Component Value Date    PSA 0.02 10/10/2018    PSA 0.03 10/17/2017    PSA 0.02 08/30/2016    PSA 0.04 09/21/2015    PSA 0.06 08/14/2015    PSA 0.05 03/09/2015    PSA  03/06/2014     <0.07  PSA results are about 7% lower than our prior method due to a methodology change   on August 30, 2011.    PSA  08/23/2013     <0.07  PSA results are about 7% lower than our prior method due to a methodology change   on August 30, 2011.    PSA 0.09 04/26/2013    PSA 0.10 02/04/2013     /80  Pulse 99  Ht 1.638 m (5' 4.5\")  Wt 60.8 kg (134 lb)  BMI 22.65 kg/m2    General: Alert, oriented, nad  Eyes: anicteric, EOMI.  Pulse: regular  Resps: normal, non-labored.  Abdomen:  nondistended.   exam deferred.     Current medications:    A-  History of prostate cancer, PSA low and stable.  Decreased libido with normal testosterone.  Anejaculation    Plan-  -Continue yearly PSA checks.    15min visit, over 50% face to face in counseling/discussion of ED, CAP, T level.       Jessica FLORENCE      "

## 2018-11-20 ENCOUNTER — TELEPHONE (OUTPATIENT)
Dept: FAMILY MEDICINE | Facility: CLINIC | Age: 63
End: 2018-11-20

## 2018-11-20 DIAGNOSIS — E11.69 TYPE 2 DIABETES MELLITUS WITH OTHER SPECIFIED COMPLICATION, WITHOUT LONG-TERM CURRENT USE OF INSULIN (H): Primary | ICD-10-CM

## 2018-11-20 NOTE — TELEPHONE ENCOUNTER
"Requested Prescriptions   Pending Prescriptions Disp Refills     JARDIANCE 10 MG TABS tablet        Last Written Prescription Date:  na  Last Fill Quantity: na,   # refills: na  Last Office Visit: 10/10/18Jolynn  Future Office visit:       Routing refill request to provider for review/approval because:  Medication is reported/historical  2     Sig: Take 1 tablet (10 mg) by mouth daily    Sodium Glucose Co-Transport Inhibitor Agents Failed    11/20/2018  9:17 AM       Failed - Blood pressure less than 140/90 in past 6 months    BP Readings from Last 3 Encounters:   11/02/18 148/80   10/10/18 126/78   06/25/18 138/68                Passed - Patient has documented LDL within the past 12 mos.    Recent Labs   Lab Test  10/10/18   1239   LDL  93            Passed - Patient has had a Microalbumin in the past 15 mos.    Recent Labs   Lab Test  10/10/18   1239   MICROL  32   UMALCR  36.95*            Passed - Patient has documented A1c within the specified period of time.    If HgbA1C is 8 or greater, it needs to be on file within the past 3 months.  If less than 8, must be on file within the past 6 months.     Recent Labs   Lab Test  10/10/18   1239   A1C  7.3*            Passed - No creatinine >1.4 or GFR <45 within the past 12 mos    Recent Labs   Lab Test  05/14/18   1634   GFRESTIMATED  55*   GFRESTBLACK  67       Recent Labs   Lab Test  05/14/18   1634   CR  1.31*            Passed - Patient is age 18 or older       Passed - Patient has documented normal Potassium within the last 12 mos.    Recent Labs   Lab Test  05/14/18   1634   POTASSIUM  3.4            Passed - Recent (6 mo) or future (30 days) visit within the authorizing provider's specialty    Patient had office visit in the last 6 months or has a visit in the next 30 days with authorizing provider or within the authorizing provider's specialty.  See \"Patient Info\" tab in inbasket, or \"Choose Columns\" in Meds & Orders section of the refill encounter.        "

## 2018-11-23 NOTE — TELEPHONE ENCOUNTER
Routing refill request to provider for review/approval because:  Medication is reported/historical.    Graciela Wilson RN, [facility]

## 2018-11-26 RX ORDER — DAPAGLIFLOZIN 10 MG/1
10 TABLET, FILM COATED ORAL DAILY
Qty: 30 TABLET | Refills: 5 | Status: SHIPPED | OUTPATIENT
Start: 2018-11-26 | End: 2020-01-30

## 2018-11-26 NOTE — TELEPHONE ENCOUNTER
This writer attempted to contact August on 11/26/18    Reason for call Lucio(Adelfo) and left message to return call.    When patient calls back, please contact 1st floor Rupal Sarmiento. routine priority.        Chris Matson

## 2018-11-26 NOTE — TELEPHONE ENCOUNTER
Rec'd PA for jardiacne, but farxiga is covered and has been what patient has been on most of this year.  Will send instead. Please inform patient.      Ja Malhotra PA-C

## 2018-11-27 NOTE — TELEPHONE ENCOUNTER
This writer attempted to contact August on 11/27/18    Reason for call formulary issue/change and left message to return call.    When patient calls back, please contact 1st floor Rupal Sarmiento. routine priority.        Chris Matson

## 2018-11-28 NOTE — TELEPHONE ENCOUNTER
Patient  returned call    Best number to reach caller: Home number on file [phone] (home)    Is it ok to leave a detailed message: YES

## 2018-12-07 ENCOUNTER — TELEPHONE (OUTPATIENT)
Dept: FAMILY MEDICINE | Facility: CLINIC | Age: 63
End: 2018-12-07

## 2018-12-07 DIAGNOSIS — L98.9 LESION OF SKIN OF FOOT: Primary | ICD-10-CM

## 2018-12-07 NOTE — TELEPHONE ENCOUNTER
..Reason for Call:  Other     Detailed comments: Patient called said the podiatrist he was referred to he can never get into see, he would like a referral to someone else. Patient also need a calk to discuus the heart doctor.    Phone Number Patient can be reached at: Home number on file [phone] (home)    Best Time: anytime    Can we leave a detailed message on this number? YES    Call taken on 12/7/2018 at 1:30 PM by Geovanny Reis

## 2018-12-09 NOTE — TELEPHONE ENCOUNTER
Please provide patient witht eh following podiatry options:    [facility] [place] [facility] [phone]   [email or web address]  [facility]: [facility] [phone]   [email or web address]  [facility]: [place] [facility] [phone]   [email or web address]  [facility]: [place] [facility] [phone]   [email or web address]      What questions does he have about the cardiologist?  Ja Malhotra PA-C

## 2018-12-10 NOTE — TELEPHONE ENCOUNTER
..Reason for Call:  Referral to another podiatrist    Detailed comments: his work schedule is opposite of Dr Jara's(every other Wed) and checked other clinics which also di not fit his schedule;      Phone Number Patient can be reached at: Home number on file [phone] (home)    Best Time: anytime    Can we leave a detailed message on this number? YES    Call taken on 12/10/2018 at 2:46 PM by Shaylee Chow

## 2018-12-10 NOTE — TELEPHONE ENCOUNTER
Patient contacted and informed about new referral for podiatry.    Patient asking about referral for heart doctor. Insurance didn't cover before but now does and he wants to schedule. Phone given to patient call to schedule.    Chris Matson CMA

## 2018-12-10 NOTE — TELEPHONE ENCOUNTER
This writer attempted to contact August on 12/10/18    Reason for call podiatry and cardiology and left detailed message call back about cardiology questions, phone for scheduling provided for podiatry.    When patient calls back, please contact [facility] Rupal Sarmiento. routine priority.        Chris Matson

## 2018-12-11 ENCOUNTER — TELEPHONE (OUTPATIENT)
Dept: UROLOGY | Facility: CLINIC | Age: 63
End: 2018-12-11

## 2018-12-11 NOTE — TELEPHONE ENCOUNTER
[facility] Call Center    Phone Message    May a detailed message be left on voicemail: yes    Reason for Call: Other: Pt said Dr Molina suggested a Pharmacy in [place] where he can get his medication cheaper and that Pharmacy is permanently closed -  so he needs to know what Pharmacy he should go to instead - Please return Pt call - Thanks     Action Taken: Message routed to:  [facility] Urology Clinic

## 2018-12-11 NOTE — TELEPHONE ENCOUNTER
Left message for patient.  Gave him the number to Rebecca Palomares.    Courtney Mulligan, RN, BSN  Urology Patient Care Supervisor

## 2018-12-12 ENCOUNTER — TELEPHONE (OUTPATIENT)
Dept: UROLOGY | Facility: CLINIC | Age: 63
End: 2018-12-12

## 2018-12-12 NOTE — TELEPHONE ENCOUNTER
Prior Authorization Retail Medication Request    Medication/Dose: viagra 100 mg  ICD code (if different than what is on RX):  ED  Previously Tried and Failed:  nothing  Rationale: help with erections    Insurance Name:  [facility]  Insurance ID:  [id number]      Pharmacy Information (if different than what is on RX)  Name:  [facility]  Phone:  [phone]

## 2018-12-13 NOTE — TELEPHONE ENCOUNTER
Central Prior Authorization Team   Phone: [phone]      PA Initiation    Medication: viagra 100 mg-PA Initiated  Insurance Company: [employer] - Phone [phone] Fax [phone]  Pharmacy Filling the Rx: [facility] [id number] [place] [address] AT SEC OF [address]  Filling Pharmacy Phone: [phone]  Filling Pharmacy Fax: [phone]  Start Date: 12/13/2018

## 2018-12-18 NOTE — TELEPHONE ENCOUNTER
PRIOR AUTHORIZATION DENIED    Medication: viagra 100 mg-DENIED    Denial Date: 12/13/2018    Denial Rational:

## 2018-12-21 ENCOUNTER — TELEPHONE (OUTPATIENT)
Dept: UROLOGY | Facility: CLINIC | Age: 63
End: 2018-12-21

## 2018-12-21 NOTE — TELEPHONE ENCOUNTER
Message left patient stating that his PA was denied. He will have to discuss getting regular viagra with his insurance company and his pharmacist. I informed him that he will have to pay out of pocket for his prescription.      Varun Babin MA

## 2018-12-21 NOTE — TELEPHONE ENCOUNTER
[facility] Call Center    Phone Message    May a detailed message be left on voicemail: yes    Reason for Call: Other: Pt states the pharmacy is telling him he can only take the generic viagra and the patient states he has never taken generic he takes regular viagra and he is confused and wants to talk to Dr. Molina. Please call Pt back.     Action Taken: Message routed to:  [facility] URO

## 2018-12-27 RX ORDER — EMPAGLIFLOZIN 10 MG/1
10 TABLET, FILM COATED ORAL DAILY
Refills: 2 | OUTPATIENT
Start: 2018-12-27

## 2018-12-27 NOTE — TELEPHONE ENCOUNTER
Jardiance 10 mg tabs  Last Written Prescription Date:  6/25/18  Last Fill Quantity: -,   # refills: 2  Last Office Visit: 10/10/18  Future Office visit:       Routing refill request to provider for review/approval because:  Drug not on the [facility], [facility] or [facility] refill protocol or controlled substance

## 2019-01-07 ENCOUNTER — OFFICE VISIT (OUTPATIENT)
Dept: FAMILY MEDICINE | Facility: CLINIC | Age: 64
End: 2019-01-07
Payer: COMMERCIAL

## 2019-01-07 VITALS
RESPIRATION RATE: 16 BRPM | HEART RATE: 106 BPM | SYSTOLIC BLOOD PRESSURE: 137 MMHG | WEIGHT: 133 LBS | OXYGEN SATURATION: 98 % | BODY MASS INDEX: 22.48 KG/M2 | DIASTOLIC BLOOD PRESSURE: 82 MMHG | TEMPERATURE: 98.5 F

## 2019-01-07 DIAGNOSIS — M21.619 BUNION: ICD-10-CM

## 2019-01-07 DIAGNOSIS — E78.5 HYPERLIPIDEMIA, UNSPECIFIED HYPERLIPIDEMIA TYPE: ICD-10-CM

## 2019-01-07 DIAGNOSIS — C61 PROSTATE CANCER (H): ICD-10-CM

## 2019-01-07 DIAGNOSIS — I10 ESSENTIAL HYPERTENSION: ICD-10-CM

## 2019-01-07 DIAGNOSIS — E11.69 TYPE 2 DIABETES MELLITUS WITH OTHER SPECIFIED COMPLICATION, WITHOUT LONG-TERM CURRENT USE OF INSULIN (H): Primary | ICD-10-CM

## 2019-01-07 DIAGNOSIS — N52.9 ERECTILE DYSFUNCTION, UNSPECIFIED ERECTILE DYSFUNCTION TYPE: ICD-10-CM

## 2019-01-07 PROCEDURE — 99214 OFFICE O/P EST MOD 30 MIN: CPT | Performed by: PHYSICIAN ASSISTANT

## 2019-01-07 RX ORDER — TAMSULOSIN HYDROCHLORIDE 0.4 MG/1
0.4 CAPSULE ORAL DAILY
Qty: 90 CAPSULE | Refills: 1 | Status: SHIPPED | OUTPATIENT
Start: 2019-01-07 | End: 2019-11-19

## 2019-01-07 RX ORDER — GLIPIZIDE 5 MG/1
TABLET, FILM COATED, EXTENDED RELEASE ORAL
Qty: 90 TABLET | Refills: 1 | Status: SHIPPED | OUTPATIENT
Start: 2019-01-07 | End: 2019-11-05

## 2019-01-07 RX ORDER — PRAVASTATIN SODIUM 40 MG
40 TABLET ORAL DAILY
Qty: 90 TABLET | Refills: 1 | Status: SHIPPED | OUTPATIENT
Start: 2019-01-07 | End: 2020-01-22

## 2019-01-07 RX ORDER — SILDENAFIL CITRATE 100 MG
100 TABLET ORAL DAILY PRN
Qty: 6 TABLET | Refills: 11 | Status: SHIPPED | OUTPATIENT
Start: 2019-01-07 | End: 2019-11-01

## 2019-01-07 RX ORDER — LISINOPRIL AND HYDROCHLOROTHIAZIDE 20; 25 MG/1; MG/1
1 TABLET ORAL DAILY
Qty: 90 TABLET | Refills: 1 | Status: SHIPPED | OUTPATIENT
Start: 2019-01-07 | End: 2019-11-01

## 2019-01-07 NOTE — PROGRESS NOTES
SUBJECTIVE:   August Ray is a 63 year old male who presents to clinic today for the following health issues:      Diabetes Follow-up      Patient is checking blood sugars: not lately since machine is not working    Diabetic concerns: None     Symptoms of hypoglycemia (low blood sugar): once in a while     Paresthesias (numbness or burning in feet) or sores: No     Date of last diabetic eye exam: needs to go this year    Insurance switched glocometers, pharm showed him how to use it but he can't get it to work.      Diabetes Management Resources    Hyperlipidemia Follow-Up      Rate your low fat/cholesterol diet?: fair    Taking statin?  Yes, no muscle aches from statin    Other lipid medications/supplements?:  none    Hypertension Follow-up      Outpatient blood pressures are being checked at home/work.  Results are in the normal range.    Low Salt Diet: low salt    WOudl like to switch to one of outr onc (s/p prostate cancer) as his moved away.      Insurance not covering ED meds- would like brand name    BP Readings from Last 2 Encounters:   01/07/19 137/82   11/02/18 148/80     Hemoglobin A1C (%)   Date Value   10/10/2018 7.3 (H)   05/07/2018 7.9 (H)     LDL Cholesterol Calculated (mg/dL)   Date Value   10/10/2018 93   11/24/2017 118 (H)       Amount of exercise or physical activity: tries to go to the gym in his area    Problems taking medications regularly: No    Medication side effects: none    Diet: regular (no restrictions)    hasn' tbeen able to get into see podiatry due to sched conflicts      Allergies   Allergen Reactions     Hydrocodone-Acetaminophen      syncope     Insulin Glargine      Itchy rash     No Clinical Screening - See Comments      Intolerance to this     Oxycodone      States he passed out/fell after taking it       Past Medical History:   Diagnosis Date     Diabetes type 2, controlled (H)      HTN (hypertension)      Hyperlipidemia        Patient Active Problem List   Diagnosis      Erectile dysfunction     PC (prostate cancer) (H)     Decreased libido     HTN (hypertension)     Hyperlipidemia     Orthostatic hypotension     Rotator cuff tear arthropathy of right shoulder     Type 2 diabetes mellitus with other specified complication, without long-term current use of insulin (H)     Gout     Allergic rhinitis     Insomnia     Lower urinary tract symptoms (LUTS)     Cervicalgia         Current Outpatient Medications on File Prior to Visit:  Acetaminophen (TYLENOL PO) Take  by mouth as needed.   aspirin 81 MG tablet Take 1 tablet by mouth daily.   ASSURE COMFORT LANCETS 30G MISC    blood glucose (NO BRAND SPECIFIED) lancets standard Use to test blood sugar 1-4 times daily or as directed.   Blood Glucose Calibration (CARESENS CONTROL A) SOLN    blood glucose monitoring (NO BRAND SPECIFIED) meter device kit Use to test blood sugar 1-4 times daily or as directed. Per insurance: One touch   blood glucose monitoring (NO BRAND SPECIFIED) test strip Use to test blood sugars 1-4 times daily or as directed. Per insurance   Blood Glucose Monitoring Suppl (CARESENS N GLUCOSE SYSTEM) MARIAELENA    CARESENS N GLUCOSE TEST test strip    dapagliflozin (FARXIGA) 10 MG TABS tablet Take 1 tablet (10 mg) by mouth daily   empagliflozin (JARDIANCE) 10 MG TABS tablet Take 1 tablet (10 mg) by mouth daily   metFORMIN (GLUCOPHAGE) 1000 MG tablet Take 1 tablet (1,000 mg) by mouth 2 times daily (with meals) PT has been changed to a different medication - cannot remember name -   oxybutynin (DITROPAN XL) 10 MG 24 hr tablet Take 1 tablet (10 mg) by mouth daily   pravastatin (PRAVACHOL) 40 MG tablet TK ONE T PO D   indomethacin (INDOCIN) 50 MG capsule    Lancet Devices (ADJUSTABLE LANCING DEVICE) MISC    Loratadine (CLARITIN PO) Take  by mouth as needed.   Multiple Vitamins-Minerals (ROGERS MULTI MEN PO) Take 1 tablet by mouth daily.   niacin (NIASPAN) 500 MG CR tablet Take 1 tablet by mouth daily.   sildenafil (VIAGRA) 100 MG  tablet Take 1 tablet (100 mg) by mouth daily as needed (take 1 hour before intercourse)   tiZANidine (ZANAFLEX) 2 MG tablet Take 1 tablet (2 mg) by mouth 3 times daily as needed for muscle spasms (Patient not taking: Reported on 10/10/2018)   [DISCONTINUED] glipiZIDE (GLUCOTROL XL) 5 MG 24 hr tablet TK 1 T PO D IN THE MORNING WITH BREAKFAST   [DISCONTINUED] lisinopril-hydrochlorothiazide (PRINZIDE,ZESTORETIC) 20-25 MG per tablet Take 1 tablet by mouth daily.   [DISCONTINUED] sildenafil (VIAGRA) 100 MG cap/tab Take 1 tablet (100 mg) by mouth daily as needed for erectile dysfunction     No current facility-administered medications on file prior to visit.     Social History     Tobacco Use     Smoking status: Former Smoker     Last attempt to quit: 1991     Years since quittin.1     Smokeless tobacco: Never Used   Substance Use Topics     Alcohol use: Yes     Drug use: Not on file       No family history on file.    ROS:  General: negative for fever  Resp: negative for chest pain   CV: negative for chest pain  GI: Negative for abd pain.  Neurologic:negative for headache    OBJECTIVE:  /82   Pulse 106   Temp 98.5  F (36.9  C) (Oral)   Resp 16   Wt 60.3 kg (133 lb)   SpO2 98%   BMI 22.48 kg/m     General:   awake, alert, and cooperative.  NAD.   Head: Normocephalic, atraumatic.  Eyes: Conjunctiva clear,   Heart: Regular rate and rhythm. No murmur.  Lungs: Chest is clear; no wheezes or rales.   Neuro: Alert and oriented - normal speech.    ASSESSMENT:    ICD-10-CM    1. Type 2 diabetes mellitus with other specified complication, without long-term current use of insulin (H) E11.69 Comprehensive metabolic panel     Hemoglobin A1c     glipiZIDE (GLUCOTROL XL) 5 MG 24 hr tablet     PODIATRY/FOOT & ANKLE SURGERY REFERRAL   2. Prostate cancer (H) C61 ONC/HEME ADULT REFERRAL     tamsulosin (FLOMAX) 0.4 MG capsule   3. Essential hypertension I10 lisinopril-hydrochlorothiazide (PRINZIDE/ZESTORETIC) 20-25 MG  tablet   4. Hyperlipidemia, unspecified hyperlipidemia type E78.5 pravastatin (PRAVACHOL) 40 MG tablet   5. Erectile dysfunction, unspecified erectile dysfunction type N52.9 VIAGRA 100 MG tablet   6. Bunion M21.619 PODIATRY/FOOT & ANKLE SURGERY REFERRAL       PLAN:   Follow up:  3 months for labs, 6 months appt  Advised about symptoms which might herald more serious problems.

## 2019-01-14 ENCOUNTER — TELEPHONE (OUTPATIENT)
Dept: FAMILY MEDICINE | Facility: CLINIC | Age: 64
End: 2019-01-14

## 2019-01-14 DIAGNOSIS — M21.611 BILATERAL BUNIONS: Primary | ICD-10-CM

## 2019-01-14 DIAGNOSIS — M21.612 BILATERAL BUNIONS: Primary | ICD-10-CM

## 2019-01-14 NOTE — TELEPHONE ENCOUNTER
He cannot get into [facility] schedule    He was referred by his employer to:    [facility]   [address]  [facility]  [phone]  Podiatry group               -or-    [facility]  [address]  [address] [facility]  [phone]    Can you send referrals to them    Thank you

## 2019-01-14 NOTE — TELEPHONE ENCOUNTER
This writer attempted to contact August on 01/14/19    Reason for call foot problem questions and left message to return call.    When patient calls back, please contact 1st floor Rupal Sarmiento. routine priority.        Chris Matson

## 2019-01-14 NOTE — TELEPHONE ENCOUNTER
This writer attempted to contact Adelfo on 01/14/19      Reason for call informed referrals placed and left detailed message.      If patient calls back:   Relay message, (read verbatim), document that pt called and close encounter        Tomy Mora MA

## 2019-01-14 NOTE — TELEPHONE ENCOUNTER
Reason for Call:  Other     Detailed comments: Patient called said he need to discuss issues with his foot with provider.  Phone Number Patient can be reached at: Home number on file [phone] (home)    Best Time: anytime    Can we leave a detailed message on this number? YES    Call taken on 1/14/2019 at 11:44 AM by Geovanny Reis

## 2019-03-12 ENCOUNTER — DOCUMENTATION ONLY (OUTPATIENT)
Dept: CARE COORDINATION | Facility: CLINIC | Age: 64
End: 2019-03-12

## 2019-03-13 NOTE — PROGRESS NOTES
I am delighted to see August Ray in consultation for chest pain.     History of Present Illness:  As you know, the patient is a 63 year old  Male without prior cardiac history who reported to his primary care physician several months ago that he has had intermittent chest pain for 1 year, occurring once every several weeks.  He describes the chest pain as being sharp in nature, lasting usually 2-3 minutes, focal in the left parasternal area without any radiation.  He denies any associated symptoms shortness of breath, palpitations, dizziness, or syncope.  There are no aggravating or alleviating factors.  Chest pain is not exertionally related.  He does not feel that the chest pain has increased in intensity or frequency.    He works full-time as a nurse's aide and a phlebotomist.  He is very active.  He is able to do his routine activities at work and at home without any limitations.  He is able to do stairs without any chest discomfort.    The following portions of the patient's history were reviewed and updated as appropriate: allergies, current medications, past family history, past medical history, past social history, past surgical history, and the problem list.    Past Medical History:  1.  Hypertension  2.  Hyperlipidemia-history of myalgia with statins per records, he does not recall  3.  Diabetes type II-he reports that he had hypoglycemia when he takes all of his oral diabetes medications prescribed; currently he takes glipizide and metformin in the morning, and takes Farxiga or Jardiance on alternate days.    4.  Prostate cancer 2007 status post seed implant  5.  Syncope May 2017, seen at [facility] emergency department, thought to be related to hypotension from new blood pressure medications, normal EKG and echocardiogram at that time    Medications:   Aspirin 81 every day  Farxiga 10 mg daily or Jardiance 10 mg daily  Glipizide 5 mg every morning  Metformin 1000 mg  qd  Lisinopril-hydrochlorothiazide 20-25 mg daily  Pravachol 40 mg daily  Sildenafil as needed  Tamsulosin 0.4 mg daily      Allergies:    Allergies   Allergen Reactions     Hydrocodone-Acetaminophen      syncope     Insulin Glargine      Itchy rash     No Clinical Screening - See Comments      Intolerance to this     Oxycodone      States he passed out/fell after taking it       Family History: No coronary artery disease    Psychosocial history:  reports that he quit smoking about 27 years ago. he has never used smokeless tobacco. He reports that he drinks alcohol.    Review of systems:   Cardiovascular: No palpitations, shortness of breath at rest, dyspnea with exertion, orthopnea, paroxysmal nocturia dyspnea, nocturia, dizziness, syncope.    In addition,   Constitutional: No change in weight, sleep or appetite.  Normal energy.  No fever or chills  Eyes: Negative for vision changes or eye problems  ENT: No problems with ears, nose or throat.  No difficulty swallowing.  Resp: No coughing, wheezing or shortness of breath  GI: No nausea, vomiting,  heartburn, abdominal pain, diarrhea, constipation or change in bowel habits  : No urinary frequency or dysuria, bladder or kidney problems  Musculoskeletal: No significant muscle or joint pains  Neurologic: No headaches, numbness, tingling, weakness, problems with balance or coordination  Psychiatric: No problems with anxiety, depression or mental health  Heme/immune/allergy: No history of bleeding or clotting problems or anemia.  No allergies or immune system problems  Integumentary: No rashes,worrisome lesions or skin problems      Physical examination  Vitals: 119/74, 101 bpm  BMI= 21    Constitutional: In general, the patient is a pleasant male in no apparent distress.    Eyes: PERRLA.  EOMI.  Sclerae white, not injected.  ENT/mouth: Normiocephalic and atraumatic.  Nares clear.  Pharynx without erythema or exudate.  Dentition intact.  No adenopathy.  No thyromegaly.  Carotids +2/2 bilaterally without bruits.  No jugular venous distension.   Card/Vasc: The PMI is in the 5th ICS in the midclavicular line. There is no heave. Regular rate and rhythm. Normal S1, S2. No murmur, rub, click, or gallop. Pulses are normal bilaterally throughout. No peripheral edema.  Respiratory: Clear to asculation.  No ronchi, wheezes, rales.  No dullness to percussion.   GI: Abdomen is soft, nontender, nondistended. No organomegaly. No AAA.  No bruits.   Integument: No significant bruises or rashes  Neurological: The neurological examination reveal a patient who was oriented to person, place, and time.    Psych: Normal  Heme/Lymph/Immun: no significant adenopathy  Chest wall: No tenderness to palpation    I have reviewed the following labs/imaging:  Labs 10/10/2018: Cholesterol 180, HDL 53, LDL 93, triglycerides 169  5/14/2018: Potassium 3.4, creatinine 1.31, hemoglobin 11, platelets 190 K, A1c 7.3, TSH 0.94    I have reviewed records from [facility].  Relevant findings are:  Echocardiogram 5/23/2017: Ejection fraction 50-60%, mild to moderate left ventricular hypertrophy, pulmonary artery pressure about 22 mm, mild diastolic dysfunction, no significant valve disease      I have personally and independently reviewed the following:  EKG 10/10/2018: Sinus rhythm, LVH, PVC, no ST/T changes  Chest x-ray 10/10/2018: Normal heart size, clear lung fields      Assessment :  1.  Chest pain.  Atypical, unlikely to be cardiac related.  He has excellent functional status at baseline. His exam and EKG are all within normal limits.  He does have significant risk factors for coronary artery disease including hypertension, diabetes, and hyperlipidemia.  He is somewhat concerned about his chest pain.  I would recommend a screening stress test at this time, if only to offer him reassurance.  2.  Hypertension.  Controlled  3.  Type 2 diabetes.  A1c is stable at 7.3.  He is on multiple oral hypoglycemics with episodes  of hypoglycemia.  I asked him to discuss with his primary care physician      Plan:  Exercise stress echocardiogram      I spent a total of 30 minutes face to face with  August Ray during today's office visit. Over 50% of this time was spent counseling the patient and/or coordinating care regarding management strategies.      The patient is to return pending above. The patient understood the treatment plan as outlined above.  There were no barriers to learning.      Gretel العراقي MD

## 2019-03-14 ENCOUNTER — OFFICE VISIT (OUTPATIENT)
Dept: CARDIOLOGY | Facility: CLINIC | Age: 64
End: 2019-03-14
Attending: PHYSICIAN ASSISTANT
Payer: COMMERCIAL

## 2019-03-14 VITALS
WEIGHT: 134.4 LBS | DIASTOLIC BLOOD PRESSURE: 74 MMHG | OXYGEN SATURATION: 97 % | HEIGHT: 67 IN | BODY MASS INDEX: 21.09 KG/M2 | HEART RATE: 101 BPM | SYSTOLIC BLOOD PRESSURE: 119 MMHG

## 2019-03-14 DIAGNOSIS — E11.69 TYPE 2 DIABETES MELLITUS WITH OTHER SPECIFIED COMPLICATION, WITHOUT LONG-TERM CURRENT USE OF INSULIN (H): ICD-10-CM

## 2019-03-14 DIAGNOSIS — I10 ESSENTIAL HYPERTENSION: ICD-10-CM

## 2019-03-14 DIAGNOSIS — E78.2 MIXED HYPERLIPIDEMIA: ICD-10-CM

## 2019-03-14 DIAGNOSIS — R07.89 ATYPICAL CHEST PAIN: Primary | ICD-10-CM

## 2019-03-14 PROCEDURE — 99204 OFFICE O/P NEW MOD 45 MIN: CPT | Mod: ZP | Performed by: INTERNAL MEDICINE

## 2019-03-14 PROCEDURE — 93010 ELECTROCARDIOGRAM REPORT: CPT | Mod: ZP | Performed by: INTERNAL MEDICINE

## 2019-03-14 PROCEDURE — 93005 ELECTROCARDIOGRAM TRACING: CPT | Mod: ZF

## 2019-03-14 PROCEDURE — G0463 HOSPITAL OUTPT CLINIC VISIT: HCPCS | Mod: 25,ZF

## 2019-03-14 ASSESSMENT — MIFFLIN-ST. JEOR: SCORE: 1358.26

## 2019-03-14 ASSESSMENT — PAIN SCALES - GENERAL: PAINLEVEL: NO PAIN (0)

## 2019-03-14 NOTE — NURSING NOTE
Chief Complaint   Patient presents with     New Patient     62 yo male present for atypical chest pain and abnormal EKG 10/10/2018     Vitals were taken and medications were reconciled. EKG was performed.    Colleen Trujillo MA    8:25 AM

## 2019-03-14 NOTE — PATIENT INSTRUCTIONS
"You were seen today in the Cardiovascular Clinic at the [facility].     Cardiology Providers you saw during your visit: Dr. Gretel العراقي     Diagnosis:   Encounter Diagnosis   Name Primary?     Atypical chest pain Yes        Results: Discussed with you today EKG      Orders:   Orders Placed This Encounter   Procedures     EKG 12-lead, tracing only (Same Day)     Exercise Stress Echocardiogram     Recommendations:   1. Exercise Stress Echocardiogram      Follow up with Provider - depending on results        Please feel free to call me with any questions or concerns.       Rabia Michelle LPN     Questions: [phone]  First press #1 for [facility] for \"Medical Questions\" to reach us Cardiology Nurses.   [phone]   First press #1 for the [facility] and then press #1     On Call Cardiologist for after hours or on weekends: [phone]   option #4 and ask to speak to the on-call Cardiologist.          If you need a medication refill please contact your pharmacy.  Please allow 3 business days for your refill to be completed.  --------------------------------------------------------------    You are scheduled for a Stress Echocardiogram at the [facility] [place].  Please report to the [facility] Waiting Room in [facility].     Please follow these instructions for the procedure:      A.  Do not take this medication/beta blocker the day before the test and the day of the test.  You can take your medication after the procedure is over.    B.  No alcohol, smoking or caffeine 12 hours before the procedure.  C.  Nothing by mouth 3 hours before the start of the procedure.          If you have further questions, please utilize [employer] to contact us.   If your question concerns the above instructions, contact:  Rabia Michelle LPN  Nurse Care Coordinator- Heart [facility]  [phone]    If your question concerns the schedule/appointment times, " contact:  [phone]

## 2019-03-14 NOTE — LETTER
3/14/2019      RE: August Ray  [address]  [place] [address]       Dear Colleague,    Thank you for the opportunity to participate in the care of your patient, August Ray, at the [facility]. Please see a copy of my visit note below.    I am delighted to see August Ray in consultation for chest pain.     History of Present Illness:  As you know, the patient is a 63 year old  Male without prior cardiac history who reported to his primary care physician several months ago that he has had intermittent chest pain for 1 year, occurring once every several weeks.  He describes the chest pain as being sharp in nature, lasting usually 2-3 minutes, focal in the left parasternal area without any radiation.  He denies any associated symptoms shortness of breath, palpitations, dizziness, or syncope.  There are no aggravating or alleviating factors.  Chest pain is not exertionally related.  He does not feel that the chest pain has increased in intensity or frequency.    He works full-time as a nurse's aide and a phlebotomist.  He is very active.  He is able to do his routine activities at work and at home without any limitations.  He is able to do stairs without any chest discomfort.    The following portions of the patient's history were reviewed and updated as appropriate: allergies, current medications, past family history, past medical history, past social history, past surgical history, and the problem list.    Past Medical History:  1.  Hypertension  2.  Hyperlipidemia-history of myalgia with statins per records, he does not recall  3.  Diabetes type II-he reports that he had hypoglycemia when he takes all of his oral diabetes medications prescribed; currently he takes glipizide and metformin in the morning, and takes Farxiga or Jardiance on alternate days.    4.  Prostate cancer 2007 status post seed implant  5.  Syncope  May 2017, seen at [facility] emergency department, thought to be related to hypotension from new blood pressure medications, normal EKG and echocardiogram at that time    Medications:   Aspirin 81 every day  Farxiga 10 mg daily or Jardiance 10 mg daily  Glipizide 5 mg every morning  Metformin 1000 mg qd  Lisinopril-hydrochlorothiazide 20-25 mg daily  Pravachol 40 mg daily  Sildenafil as needed  Tamsulosin 0.4 mg daily      Allergies:    Allergies   Allergen Reactions     Hydrocodone-Acetaminophen      syncope     Insulin Glargine      Itchy rash     No Clinical Screening - See Comments      Intolerance to this     Oxycodone      States he passed out/fell after taking it       Family History: No coronary artery disease    Psychosocial history:  reports that he quit smoking about 27 years ago. he has never used smokeless tobacco. He reports that he drinks alcohol.    Review of systems:   Cardiovascular: No palpitations, shortness of breath at rest, dyspnea with exertion, orthopnea, paroxysmal nocturia dyspnea, nocturia, dizziness, syncope.    In addition,   Constitutional: No change in weight, sleep or appetite.  Normal energy.  No fever or chills  Eyes: Negative for vision changes or eye problems  ENT: No problems with ears, nose or throat.  No difficulty swallowing.  Resp: No coughing, wheezing or shortness of breath  GI: No nausea, vomiting,  heartburn, abdominal pain, diarrhea, constipation or change in bowel habits  : No urinary frequency or dysuria, bladder or kidney problems  Musculoskeletal: No significant muscle or joint pains  Neurologic: No headaches, numbness, tingling, weakness, problems with balance or coordination  Psychiatric: No problems with anxiety, depression or mental health  Heme/immune/allergy: No history of bleeding or clotting problems or anemia.  No allergies or immune system problems  Integumentary: No rashes,worrisome lesions or skin problems    Physical examination  Vitals: 119/74,  101 bpm  BMI= 21    Constitutional: In general, the patient is a pleasant male in no apparent distress.    Eyes: PERRLA.  EOMI.  Sclerae white, not injected.  ENT/mouth: Normiocephalic and atraumatic.  Nares clear.  Pharynx without erythema or exudate.  Dentition intact.  No adenopathy.  No thyromegaly. Carotids +2/2 bilaterally without bruits.  No jugular venous distension.   Card/Vasc: The PMI is in the 5th ICS in the midclavicular line. There is no heave. Regular rate and rhythm. Normal S1, S2. No murmur, rub, click, or gallop. Pulses are normal bilaterally throughout. No peripheral edema.  Respiratory: Clear to asculation.  No ronchi, wheezes, rales.  No dullness to percussion.   GI: Abdomen is soft, nontender, nondistended. No organomegaly. No AAA.  No bruits.   Integument: No significant bruises or rashes  Neurological: The neurological examination reveal a patient who was oriented to person, place, and time.    Psych: Normal  Heme/Lymph/Immun: no significant adenopathy  Chest wall: No tenderness to palpation    I have reviewed the following labs/imaging:  Labs 10/10/2018: Cholesterol 180, HDL 53, LDL 93, triglycerides 169  5/14/2018: Potassium 3.4, creatinine 1.31, hemoglobin 11, platelets 190 K, A1c 7.3, TSH 0.94    I have reviewed records from [facility].  Relevant findings are:  Echocardiogram 5/23/2017: Ejection fraction 50-60%, mild to moderate left ventricular hypertrophy, pulmonary artery pressure about 22 mm, mild diastolic dysfunction, no significant valve disease    I have personally and independently reviewed the following:  EKG 10/10/2018: Sinus rhythm, LVH, PVC, no ST/T changes  Chest x-ray 10/10/2018: Normal heart size, clear lung fields    Assessment :  1.  Chest pain.  Atypical, unlikely to be cardiac related.  He has excellent functional status at baseline. His exam and EKG are all within normal limits.  He does have significant risk factors for coronary artery disease including  hypertension, diabetes, and hyperlipidemia.  He is somewhat concerned about his chest pain.  I would recommend a screening stress test at this time, if only to offer him reassurance.  2.  Hypertension.  Controlled  3.  Type 2 diabetes.  A1c is stable at 7.3.  He is on multiple oral hypoglycemics with episodes of hypoglycemia.  I asked him to discuss with his primary care physician    Plan:  Exercise stress echocardiogram    I spent a total of 30 minutes face to face with  August Ray during today's office visit. Over 50% of this time was spent counseling the patient and/or coordinating care regarding management strategies.    The patient is to return pending above. The patient understood the treatment plan as outlined above.  There were no barriers to learning.    Gretel العراقي MD

## 2019-03-15 LAB — INTERPRETATION ECG - MUSE: NORMAL

## 2019-03-25 ENCOUNTER — ALLIED HEALTH/NURSE VISIT (OUTPATIENT)
Dept: EDUCATION SERVICES | Facility: CLINIC | Age: 64
End: 2019-03-25
Payer: COMMERCIAL

## 2019-03-25 VITALS — WEIGHT: 138.8 LBS | HEIGHT: 66 IN | BODY MASS INDEX: 22.31 KG/M2

## 2019-03-25 DIAGNOSIS — E11.69 TYPE 2 DIABETES MELLITUS WITH OTHER SPECIFIED COMPLICATION, WITHOUT LONG-TERM CURRENT USE OF INSULIN (H): Primary | ICD-10-CM

## 2019-03-25 PROCEDURE — G0108 DIAB MANAGE TRN  PER INDIV: HCPCS

## 2019-03-25 PROCEDURE — 99207 ZZC DROP WITH A PROCEDURE: CPT

## 2019-03-25 ASSESSMENT — MIFFLIN-ST. JEOR: SCORE: 1358.37

## 2019-03-25 NOTE — PATIENT INSTRUCTIONS
1. Try the Farxiga. If you feel dizzy or lightheaded, check your blood pressure and blood sugar.  Farxiga (and Jardiance) may also help lower blood pressure so if you feel light-headed and blood pressure is low, may need to adjust the Lisinopril. Reach out to your doctor.     2. Take 1 Metformin with breakfast and 1 tablet with dinner.  Ok to take Glipizide and Farxiga at breakfast.     3. Always have source of sugar nearby incase of a low blood glucose.     4. Check over feet when you are done showering.  Check for any wounds on bottom of feet and always wear hard-soled shoes.  Let doctor know of any foot problems/wounds right away.     5. If seeing some high blood glucose after lunch or dinner, try to reduce rice at meals - only need about 1 cup (fist size). Ok to have 1.5 plantains or small sweet potato (fist size) at the meal with soup.    -Could also try eating breakfast more often if feeling hungry or need to reduce portions at lunch or dinner and/or eat small snack.   -Could also try walking directly after a meal    Radha Arboleda RD, LD, CDE  [phone]

## 2019-03-25 NOTE — PROGRESS NOTES
"Diabetes Self-Management Education & Support    Diabetes Education Self Management & Training    SUBJECTIVE/OBJECTIVE:  Presents for: Individual review  Accompanied by: Self  Diabetes education in the past 24mo: No  Focus of Visit: Taking Medication  Diabetes type: Type 2  Date of diagnosis: 1997  Disease course: Improving  How confident are you filling out medical forms by yourself:: Somewhat  Diabetes management related comments/concerns: Says he received a call stating he had to be seen by diabetes education.  Says he had education before from his doctor.   Transportation concerns: No  Other concerns:: English as a second language, Glasses  Cultural Influences/Ethnic Background:      Diabetes Symptoms & Complications  Blurred vision: No  Fatigue: No  Neuropathy: No  Foot ulcerations: No  Polydipsia: No  Polyphagia: No  Polyuria: No  Visual change: No  Weakness: No  Weight loss: No  Slow healing wounds: No  Recent Infection(s): No  Autonomic neuropathy: No  CVA: No  Heart disease: No  Nephropathy: Yes  Peripheral neuropathy: No  Peripheral Vascular Disease: No  Retinopathy: No  Sexual dysfunction: Yes    Patient Problem List and Family Medical History reviewed for relevant medical history, current medical status, and diabetes risk factors.    Vitals:  Ht 1.67 m (5' 5.75\")   Wt 63 kg (138 lb 12.8 oz)   BMI 22.57 kg/m    Estimated body mass index is 22.57 kg/m  as calculated from the following:    Height as of this encounter: 1.67 m (5' 5.75\").    Weight as of this encounter: 63 kg (138 lb 12.8 oz).   Last 3 BP:   BP Readings from Last 3 Encounters:   03/14/19 119/74   01/07/19 137/82   11/02/18 148/80       History   Smoking Status     Former Smoker     Quit date: 11/26/1991   Smokeless Tobacco     Never Used       Labs:  Lab Results   Component Value Date    A1C 7.3 10/10/2018     Lab Results   Component Value Date     05/14/2018     Lab Results   Component Value Date    LDL 93 10/10/2018     HDL " Cholesterol   Date Value Ref Range Status   10/10/2018 53 >39 mg/dL Final   ]  GFR Estimate   Date Value Ref Range Status   2018 55 (L) >60 mL/min/1.7m2 Final     Comment:     Non  GFR Calc     GFR Estimate If Black   Date Value Ref Range Status   2018 67 >60 mL/min/1.7m2 Final     Comment:      GFR Calc     Lab Results   Component Value Date    CR 1.31 2018     No results found for: MICROALBUMIN    Healthy Eating  Healthy Eating Assessed Today: Yes  Cultural/Mormonism diet restrictions?: No  Patient on a regular basis: Skips meals regularly(Usually eats 2 meals a day)  Meal planning: None  Meals include: Lunch, Dinner  Breakfast: None OR oatmeal OR egg and 2-3 slices bread and milk or water ([place]: 8am)  Lunch: 2 cups Rice with soup ((fish/meat/chicken) and onion, greens or bean soup or green sweet potato soup)  Dinner: 2 cups rice with spinach and chicken and fish   Snacks: banana OR melon  Beverages: Water, Diet soda, Milk  Has patient met with a dietitian in the past?: No    Being Active  Being Active Assessed Today: Yes  Exercise:: Yes  Days per week of moderate to strenuous exercise (like a brisk walk): 2(2-3 days)  On average, minutes per day of exercise at this level: 30(Gym or will walk for 30-60 minutes)  Exercise Minutes per Week: 60  Barrier to exercise: None    Monitoring  Monitoring Assessed Today: Yes  Did patient bring glucose meter to appointment? : No  Blood Glucose Meter: Unknown  Home Glucose (Sugar) Monitorin-2 times per day    Forgot meter at home.  Says other day believes blood glucose was in the 130's after a meal.     Taking Medications  Diabetes Medication(s)     Biguanides       metFORMIN (GLUCOPHAGE) 1000 MG tablet    Take 1 tablet (1,000 mg) by mouth 2 times daily (with meals) PT has been changed to a different medication - cannot remember name -    Sodium-Glucose Co-Transporter 2 (SGLT2) Inhibitors       dapagliflozin (FARXIGA) 10 MG  TABS tablet    Take 1 tablet (10 mg) by mouth daily    Sulfonylureas       glipiZIDE (GLUCOTROL XL) 5 MG 24 hr tablet    TK 1 T PO D IN THE MORNING WITH BREAKFAST          Taking Medication Assessed Today: Yes  Current Treatments: Oral Agent (triple therapy)  Problems taking diabetes medications regularly?: No  Diabetes medication side effects?: Yes(Felt dizzy on Jardiance so now has Farxiga but yet to take)  Treatment Compliance: All of the time    Problem Solving  Problem Solving Assessed Today: Yes  Hypoglycemia Frequency: Rarely  Hypoglycemia Treatment: Other food(Pop)  Patient carries a carbohydrate source: Yes    Hypoglycemia symptoms  Confusion: No  Dizziness or Light-Headedness: Yes  Headaches: No  Hunger: No  Mood changes: No  Nervousness/Anxiety: No  Sleepiness: No  Speech difficulty: No  Sweats: Yes  Tremors: No    Hypoglycemia Complications  Blackouts: No  Hospitalization: No  Nocturnal hypoglycemia: No  Required assistance: No  Required glucagon injection: No  Seizures: No    Reducing Risks  Reducing Risks Assessed Today: Yes  Diabetes Risks: Ethnicity  CAD Risks: Diabetes Mellitus, Male sex, Hypertension  Has dilated eye exam at least once a year?: Yes  Sees dentist every 6 months?: No(Usually will go every year but says delayed due to insurance change from job change)  Sees podiatrist (foot doctor)?: Yes    Healthy Coping  Healthy Coping Assessed Today: Yes  Emotional response to diabetes: Acceptance  Informal Support system:: Spouse  Stage of change: MAINTENANCE (Working to maintain change, with risk of relapse)  Difficulty affording diabetes management supplies?: No  Patient Activation Measure Survey Score:  No flowsheet data found.    ASSESSMENT:  Patient already has new meter but forgot name of it and did not bring it with him.  Unsure of blood glucose control so no adjustment to medications recommended. Adelfo did imply he was worried to start Farxiga due to concerns over feeling lightheaded, like  he did with Jardiance.  Discussed how the SGLT-2 inhibitors may lower blood pressure trying to check blood pressure as well as blood sugar, since has blood pressure monitor at home and one available with work; says day lightheaded on Jardiance, blood glucose was elevated but did not know to check blood pressure.  Will start Farxiga and reach out to MD if not tolerated or if seeing low blood pressure so lisinopril can be adjusted.    Discussing using blood glucose data to help with making adjustments to improve blood glucose control and target blood glucose values.  Discussed diet intake and changes to try for elevated blood glucose after meals by reducing rice and/or spreading intake to 3 meals and possibly smaller snacks if feeling hungry.  Also discussed walking after eating if blood glucose are high after meals.  Reviewed hypoglycemia safety and treatment today and patient encouraged to always have meter and source of rapid-acting glucose nearby incase of hypoglycemia.  He verbalized understanding.    Discussed risk reduction.  Patient due for dental visit - did not have insurance and waiting for new insurance to active with new job. Says he tries to get in for eye exams yearly.  Instructed on foot care today and checking over feet when showering.  Pt verbalized understanding of concepts discussed and recommendations provided.   Patient declines follow up since seeing MD next month but if A1C not target, encouraged him to call for follow up and bring meter along.       Patient's most recent   Lab Results   Component Value Date    A1C 7.3 10/10/2018    is meeting goal of <8.0    INTERVENTION:   Diabetes knowledge and skills assessment:     Patient is knowledgeable in diabetes management concepts related to: Being Active, Monitoring, Taking Medication, Problem Solving, Reducing Risks and Healthy Coping    Patient needs further education on the following diabetes management concepts: Healthy Eating, Being Active,  Monitoring, Problem Solving and Reducing Risks    Based on learning assessment above, most appropriate setting for further diabetes education would be: Individual setting.    Education provided today on:  AADE Self-Care Behaviors:  Diabetes Pathophysiology  Healthy Eating: carbohydrate counting, consistency in amount, composition, and timing of food intake, heart healthy diet and portion control  Being Active: relationship to blood glucose, describe appropriate activity program and precautions to take  Monitoring: purpose, log and interpret results, individual blood glucose targets and frequency of monitoring  Taking Medication: action of prescribed medication, side effects of prescribed medications and when to take medications  Problem Solving: high blood glucose - causes, signs/symptoms, treatment and prevention, low blood glucose - causes, signs/symptoms, treatment and prevention, carrying a carbohydrate source at all times and when to call health care provider  Reducing Risks: major complications of diabetes, prevention, early diagnostic measures and treatment of complications, foot care, appropriate dental care, annual eye exam and A1C - goals, relating to blood glucose levels, how often to check  Healthy Coping: recognize feelings about diagnosis, benefits of making appropriate lifestyle changes and utilize support systems    Opportunities for ongoing education and support in diabetes-self management were discussed.    Pt verbalized understanding of concepts discussed and recommendations provided today.       Education Materials Provided:  BG Log Sheet    PLAN:  See Patient Instructions for co-developed, patient-stated behavior change goals.  AVS printed and provided to patient today. See Follow-Up section for recommended follow-up.    Radha Arboleda RD, LD, CDE   Time Spent: 60 minutes  Encounter Type: Individual    Any diabetes medication dose changes were made via the [facility] Protocol and Collaborative Practice  Agreement with the patient's referring provider. A copy of this encounter was shared with the provider.

## 2019-04-16 DIAGNOSIS — E11.69 TYPE 2 DIABETES MELLITUS WITH OTHER SPECIFIED COMPLICATION, WITHOUT LONG-TERM CURRENT USE OF INSULIN (H): ICD-10-CM

## 2019-04-16 DIAGNOSIS — R39.9 LOWER URINARY TRACT SYMPTOMS (LUTS): ICD-10-CM

## 2019-04-16 NOTE — TELEPHONE ENCOUNTER
"Requested Prescriptions   Pending Prescriptions Disp Refills     metFORMIN (GLUCOPHAGE) 1000 MG tablet  Last Written Prescription Date:  05/07/18  Last Fill Quantity: 60,  # refills: 2   Last Office Visit with G, P or [facility] prescribing provider:  01/07/19   Future Office Visit:    60 tablet 2     Sig: Take 1 tablet (1,000 mg) by mouth 2 times daily (with meals) PT has been changed to a different medication - cannot remember name -       Biguanide Agents Failed - 4/16/2019  2:33 PM        Failed - Patient has documented A1c within the specified period of time.     If HgbA1C is 8 or greater, it needs to be on file within the past 3 months.  If less than 8, must be on file within the past 6 months.     Recent Labs   Lab Test 10/10/18  1239   A1C 7.3*             Passed - Blood pressure less than 140/90 in past 6 months     BP Readings from Last 3 Encounters:   03/14/19 119/74   01/07/19 137/82   11/02/18 148/80                 Passed - Patient has documented LDL within the past 12 mos.     Recent Labs   Lab Test 10/10/18  1239   LDL 93             Passed - Patient has had a Microalbumin in the past 15 mos.     Recent Labs   Lab Test 10/10/18  1239   MICROL 32   UMALCR 36.95*             Passed - Patient is age 10 or older        Passed - Patient's CR is NOT>1.4 OR Patient's EGFR is NOT<45 within past 12 mos.     Recent Labs   Lab Test 05/14/18  1634   GFRESTIMATED 55*   GFRESTBLACK 67       Recent Labs   Lab Test 05/14/18  1634   CR 1.31*             Passed - Patient does NOT have a diagnosis of CHF.        Passed - Medication is active on med list        Passed - Recent (6 mo) or future (30 days) visit within the authorizing provider's specialty     Patient had office visit in the last 6 months or has a visit in the next 30 days with authorizing provider or within the authorizing provider's specialty.  See \"Patient Info\" tab in inbasket, or \"Choose Columns\" in Meds & Orders section of the refill encounter.     " "       oxybutynin ER (DITROPAN XL) 10 MG 24 hr tablet  Last Written Prescription Date:  05/07/18  Last Fill Quantity: 90,  # refills: 0   [facility] Office Visit with [facility] prescribing provider:  01/07/19Jolynn   Future Office Visit:    90 tablet 0     Sig: Take 1 tablet (10 mg) by mouth daily       Muscarinic Antagonists (Urinary Incontinence Agents) Passed - 4/16/2019  2:33 PM        Passed - Recent (12 mo) or future (30 days) visit within the authorizing provider's specialty     Patient had office visit in the last 12 months or has a visit in the next 30 days with authorizing provider or within the authorizing provider's specialty.  See \"Patient Info\" tab in inbasket, or \"Choose Columns\" in Meds & Orders section of the refill encounter.              Passed - Medication is Oxybutynin and patient is 5 years of age or older        Passed - Patient does not have a diagnosis of glaucoma on the problem list     If glaucoma diagnosis is new, refer refill to physician.          Passed - Medication is active on med list        Passed - Patient is 18 years of age or older          "

## 2019-04-17 RX ORDER — OXYBUTYNIN CHLORIDE 10 MG/1
10 TABLET, EXTENDED RELEASE ORAL DAILY
Qty: 90 TABLET | Refills: 0 | Status: SHIPPED | OUTPATIENT
Start: 2019-04-17 | End: 2019-11-01

## 2019-04-17 NOTE — TELEPHONE ENCOUNTER
Routing refill request to provider for review/approval because:  Labs not current:  A1c  A break in medication - Both filled in May 2018 for less than a 6 month supply.    Kailee Dunlap RN

## 2019-04-18 NOTE — TELEPHONE ENCOUNTER
Called, patient is notified and lab appointment is scheduled.  Kalen Loya,  [facility]

## 2019-04-19 ENCOUNTER — TELEPHONE (OUTPATIENT)
Dept: UROLOGY | Facility: CLINIC | Age: 64
End: 2019-04-19

## 2019-04-19 NOTE — TELEPHONE ENCOUNTER
[facility] Call Center    Phone Message    May a detailed message be left on voicemail: yes    Reason for Call: Other: Pt calling to get some recommendations of pharmacies in [place] that he can go to so he can get his Viagra medication. Please give pt a call back to discuss as he was in [place] for awhile and has just returned.      Action Taken: Message routed to:  [facility] Urology

## 2019-04-22 DIAGNOSIS — E11.69 TYPE 2 DIABETES MELLITUS WITH OTHER SPECIFIED COMPLICATION, WITHOUT LONG-TERM CURRENT USE OF INSULIN (H): ICD-10-CM

## 2019-04-22 LAB
ALBUMIN SERPL-MCNC: 3.8 G/DL (ref 3.4–5)
ALP SERPL-CCNC: 93 U/L (ref 40–150)
ALT SERPL W P-5'-P-CCNC: 25 U/L (ref 0–70)
ANION GAP SERPL CALCULATED.3IONS-SCNC: 7 MMOL/L (ref 3–14)
AST SERPL W P-5'-P-CCNC: 23 U/L (ref 0–45)
BILIRUB SERPL-MCNC: 0.3 MG/DL (ref 0.2–1.3)
BUN SERPL-MCNC: 11 MG/DL (ref 7–30)
CALCIUM SERPL-MCNC: 9.3 MG/DL (ref 8.5–10.1)
CHLORIDE SERPL-SCNC: 106 MMOL/L (ref 94–109)
CO2 SERPL-SCNC: 26 MMOL/L (ref 20–32)
CREAT SERPL-MCNC: 0.82 MG/DL (ref 0.66–1.25)
GFR SERPL CREATININE-BSD FRML MDRD: >90 ML/MIN/{1.73_M2}
GLUCOSE SERPL-MCNC: 258 MG/DL (ref 70–99)
HBA1C MFR BLD: 6.9 % (ref 0–5.6)
POTASSIUM SERPL-SCNC: 4 MMOL/L (ref 3.4–5.3)
PROT SERPL-MCNC: 8.3 G/DL (ref 6.8–8.8)
SODIUM SERPL-SCNC: 139 MMOL/L (ref 133–144)

## 2019-04-22 PROCEDURE — 83036 HEMOGLOBIN GLYCOSYLATED A1C: CPT | Performed by: PHYSICIAN ASSISTANT

## 2019-04-22 PROCEDURE — 36415 COLL VENOUS BLD VENIPUNCTURE: CPT | Performed by: PHYSICIAN ASSISTANT

## 2019-04-22 PROCEDURE — 80053 COMPREHEN METABOLIC PANEL: CPT | Performed by: PHYSICIAN ASSISTANT

## 2019-04-22 NOTE — LETTER
April 22, 2019      August EDDY Cory  [address]   [place] [address]        Dear August EDDY Cory    All of your labs were very good. We will see you in 3 months for a check up.     Please contact the clinic if you have additional questions or if symptoms persist.  Thank you.      Enclosed is a copy of the results.  It was a pleasure to see you at your last appointment.      Sincerely,      Ja Malhotra PA-C/N. RAIZA Stark      Results for orders placed or performed in visit on 04/22/19   Hemoglobin A1c   Result Value Ref Range    Hemoglobin A1C 6.9 (H) 0 - 5.6 %   Comprehensive metabolic panel   Result Value Ref Range    Sodium 139 133 - 144 mmol/L    Potassium 4.0 3.4 - 5.3 mmol/L    Chloride 106 94 - 109 mmol/L    Carbon Dioxide 26 20 - 32 mmol/L    Anion Gap 7 3 - 14 mmol/L    Glucose 258 (H) 70 - 99 mg/dL    Urea Nitrogen 11 7 - 30 mg/dL    Creatinine 0.82 0.66 - 1.25 mg/dL    GFR Estimate >90 >60 mL/min/[1.73_m2]    GFR Estimate If Black >90 >60 mL/min/[1.73_m2]    Calcium 9.3 8.5 - 10.1 mg/dL    Bilirubin Total 0.3 0.2 - 1.3 mg/dL    Albumin 3.8 3.4 - 5.0 g/dL    Protein Total 8.3 6.8 - 8.8 g/dL    Alkaline Phosphatase 93 40 - 150 U/L    ALT 25 0 - 70 U/L    AST 23 0 - 45 U/L

## 2019-04-22 NOTE — RESULT ENCOUNTER NOTE
Please send letter if doesn't check mychart.  Ja Malhotra PA-C    Mr. Cory,    All of your labs were very good. We will see you in 3 months for a check up.    Please contact the clinic if you have additional questions or if symptoms persist.  Thank you.    Sincerely,    Ibrahima Malhotra

## 2019-04-22 NOTE — TELEPHONE ENCOUNTER
[facility] Call Center    Phone Message    May a detailed message be left on voicemail: yes    Reason for Call: Other: Pt called back to speak with Dr. Molina to further discuss what pharmacy in [place] he could get his prescriptions sent to.      Action Taken: Message routed to:  [facility] Urology

## 2019-04-30 NOTE — TELEPHONE ENCOUNTER
[facility] Call Center    Phone Message    May a detailed message be left on voicemail: yes    Reason for Call: Other: Per call from PT is following up on the requests and is requesting for a call back from Dr Molina to discuss it     Action Taken: Message routed to:  [facility] Urology

## 2019-05-01 ENCOUNTER — TELEPHONE (OUTPATIENT)
Dept: FAMILY MEDICINE | Facility: CLINIC | Age: 64
End: 2019-05-01

## 2019-05-01 NOTE — TELEPHONE ENCOUNTER
Plan does not cover this medication. Please call plan at [phone] to initiate prior authorization or call/fax pharmacy to change medication at [phone]. Patient ID # is [id number].        Mekhi Andujar  [facility]

## 2019-05-02 NOTE — TELEPHONE ENCOUNTER
Left message for the patient to call [facility] [phone] for 100 tablets at $86 and it is cheaper than [place] drugs at this point per Dr Jesse Tinsley, RN   Care Coordinator Urology

## 2019-05-06 DIAGNOSIS — R68.82 DECREASED LIBIDO: ICD-10-CM

## 2019-05-06 DIAGNOSIS — C61 PROSTATE CANCER (H): ICD-10-CM

## 2019-05-06 DIAGNOSIS — N52.9 ERECTILE DYSFUNCTION, UNSPECIFIED ERECTILE DYSFUNCTION TYPE: ICD-10-CM

## 2019-05-06 RX ORDER — SILDENAFIL 100 MG/1
100 TABLET, FILM COATED ORAL DAILY PRN
Qty: 100 TABLET | Refills: 0 | Status: SHIPPED | OUTPATIENT
Start: 2019-05-06

## 2019-05-08 NOTE — TELEPHONE ENCOUNTER
Called [facility] pharmacy due to prescription has been sent to them by Dr. Molina on 05/06/2019 for 100 tabs. Patient has yet to  prescription.    Teja Khan MA on 5/8/2019 at 10:51 AM

## 2019-05-08 NOTE — PROGRESS NOTES
138/89  SUBJECTIVE:   August Ray is a 63 year old male who presents to clinic today for the following   health issues:      Diabetes Follow-up    Patient is checking blood sugars: twice daily.    B  Results are as follows:         am - Can't Remember     Diabetic concerns: None     Symptoms of hypoglycemia (low blood sugar): none     Paresthesias (numbness or burning in feet) or sores: No     Date of last diabetic eye exam: Unknown, making an appointment     Diabetes Management Resources    Hyperlipidemia Follow-Up      Rate your low fat/cholesterol diet?: good    Taking statin?  No    Other lipid medications/supplements?:  Niacin, without side effects    Hypertension Follow-up      Outpatient blood pressures are being checked at home.  Results are can't remember .    Low Salt Diet: not monitoring salt    BP Readings from Last 2 Encounters:   03/14/19 119/74   01/07/19 137/82     Hemoglobin A1C (%)   Date Value   04/22/2019 6.9 (H)   10/10/2018 7.3 (H)     LDL Cholesterol Calculated (mg/dL)   Date Value   10/10/2018 93   11/24/2017 118 (H)       Amount of exercise or physical activity: 4-5 days/week for an average of 30-45 minutes    Problems taking medications regularly: No    Medication side effects: lightheadedness    Diet: regular (no restrictions)    1 month ago at work felt faint, and found to have low BP  Hx microalbuminuria.        Feels like he is urinating too much- interested in holding oxybutynin.           Allergies   Allergen Reactions     Hydrocodone-Acetaminophen      syncope     Insulin Glargine      Itchy rash     No Clinical Screening - See Comments      Intolerance to this     Oxycodone      States he passed out/fell after taking it       Past Medical History:   Diagnosis Date     Diabetes type 2, controlled (H)      HTN (hypertension)      Hyperlipidemia        Patient Active Problem List   Diagnosis     Erectile dysfunction     PC (prostate cancer) (H)     Decreased libido     HTN  (hypertension)     Hyperlipidemia     Orthostatic hypotension     Rotator cuff tear arthropathy of right shoulder     Type 2 diabetes mellitus with other specified complication, without long-term current use of insulin (H)     Gout     Allergic rhinitis     Insomnia     Lower urinary tract symptoms (LUTS)     Cervicalgia         Current Outpatient Medications on File Prior to Visit:  Acetaminophen (TYLENOL PO) Take  by mouth as needed.   aspirin 81 MG tablet Take 1 tablet by mouth daily.   ASSURE COMFORT LANCETS 30G MISC    atorvastatin (LIPITOR) 80 MG tablet TK SS A T D   blood glucose (NO BRAND SPECIFIED) lancets standard Use to test blood sugar 1-4 times daily or as directed.   Blood Glucose Calibration (CARESENS CONTROL A) SOLN    blood glucose monitoring (NO BRAND SPECIFIED) meter device kit Use to test blood sugar 1-4 times daily or as directed. Per insurance: One touch   blood glucose monitoring (NO BRAND SPECIFIED) test strip Use to test blood sugars 1-4 times daily or as directed. Per insurance   dapagliflozin (FARXIGA) 10 MG TABS tablet Take 1 tablet (10 mg) by mouth daily   glipiZIDE (GLUCOTROL XL) 5 MG 24 hr tablet TK 1 T PO D IN THE MORNING WITH BREAKFAST   Lancet Devices (ADJUSTABLE LANCING DEVICE) MISC    lisinopril-hydrochlorothiazide (PRINZIDE/ZESTORETIC) 20-25 MG tablet Take 1 tablet by mouth daily   Loratadine (CLARITIN PO) Take  by mouth as needed.   metFORMIN (GLUCOPHAGE) 1000 MG tablet Take 1 tablet (1,000 mg) by mouth 2 times daily (with meals) PT has been changed to a different medication - cannot remember name -   Multiple Vitamins-Minerals (ROGERS MULTI MEN PO) Take 1 tablet by mouth daily.   niacin (NIASPAN) 500 MG CR tablet Take 1 tablet by mouth daily.   oxybutynin ER (DITROPAN XL) 10 MG 24 hr tablet Take 1 tablet (10 mg) by mouth daily   pravastatin (PRAVACHOL) 40 MG tablet Take 1 tablet (40 mg) by mouth daily   tamsulosin (FLOMAX) 0.4 MG capsule Take 1 capsule (0.4 mg) by mouth daily  "  tiZANidine (ZANAFLEX) 2 MG tablet Take 1 tablet (2 mg) by mouth 3 times daily as needed for muscle spasms   VIAGRA 100 MG tablet Take 1 tablet (100 mg) by mouth daily as needed (sexual activity)   atorvastatin (LIPITOR) 40 MG tablet    Blood Glucose Monitoring Suppl (CARESENS N GLUCOSE SYSTEM) MARIAELENA    CARESENS N GLUCOSE TEST test strip    indomethacin (INDOCIN) 50 MG capsule    pravastatin (PRAVACHOL) 40 MG tablet TK ONE T PO D   sildenafil (VIAGRA) 100 MG tablet Take 1 tablet (100 mg) by mouth daily as needed (take 1 hour before intercourse) (Patient not taking: Reported on 2019)     No current facility-administered medications on file prior to visit.     Social History     Tobacco Use     Smoking status: Former Smoker     Last attempt to quit: 1991     Years since quittin.4     Smokeless tobacco: Never Used   Substance Use Topics     Alcohol use: Yes     Drug use: None       History reviewed. No pertinent family history.    ROS:  General: negative for fever  ENDO hx DM  CARDIO hx HTN   hx ed    OBJECTIVE:  /70   Pulse 91   Temp 97.6  F (36.4  C) (Oral)   Resp 18   Ht 1.67 m (5' 5.75\")   Wt 61 kg (134 lb 6.4 oz)   SpO2 100%   BMI 21.86 kg/m     General:   awake, alert, and cooperative.  NAD.   Head: Normocephalic, atraumatic.  Eyes: Conjunctiva clear,   H Lungs: regular rate   Neuro: Alert and oriented - normal speech.    ASSESSMENT:    ICD-10-CM    1. Essential hypertension I10 lisinopril-hydrochlorothiazide (PRINZIDE/ZESTORETIC) 20-12.5 MG tablet   2. Type 2 diabetes mellitus with other specified complication, without long-term current use of insulin (H) E11.69    3. Orthostatic hypotension I95.1      I spent a total of 25 minutes in face to face time with > 50% of the visit related to  counseling/care coordination.      PLAN: lower hydrochlorothiazide a bit given hypotension event, hold oxybutynin, dont take atorvastatin  Follow up:  6 months  Advised about symptoms which might " herald more serious problems.

## 2019-05-09 ENCOUNTER — OFFICE VISIT (OUTPATIENT)
Dept: FAMILY MEDICINE | Facility: CLINIC | Age: 64
End: 2019-05-09
Payer: COMMERCIAL

## 2019-05-09 VITALS
OXYGEN SATURATION: 100 % | RESPIRATION RATE: 18 BRPM | HEIGHT: 66 IN | DIASTOLIC BLOOD PRESSURE: 70 MMHG | BODY MASS INDEX: 21.6 KG/M2 | WEIGHT: 134.4 LBS | TEMPERATURE: 97.6 F | SYSTOLIC BLOOD PRESSURE: 118 MMHG | HEART RATE: 91 BPM

## 2019-05-09 DIAGNOSIS — E11.69 TYPE 2 DIABETES MELLITUS WITH OTHER SPECIFIED COMPLICATION, WITHOUT LONG-TERM CURRENT USE OF INSULIN (H): ICD-10-CM

## 2019-05-09 DIAGNOSIS — I10 ESSENTIAL HYPERTENSION: Primary | ICD-10-CM

## 2019-05-09 DIAGNOSIS — I95.1 ORTHOSTATIC HYPOTENSION: ICD-10-CM

## 2019-05-09 PROCEDURE — 99214 OFFICE O/P EST MOD 30 MIN: CPT | Performed by: PHYSICIAN ASSISTANT

## 2019-05-09 RX ORDER — ATORVASTATIN CALCIUM 80 MG/1
TABLET, FILM COATED ORAL
Refills: 0 | COMMUNITY
Start: 2019-05-01 | End: 2019-11-01

## 2019-05-09 RX ORDER — LISINOPRIL AND HYDROCHLOROTHIAZIDE 12.5; 2 MG/1; MG/1
1 TABLET ORAL DAILY
Qty: 90 TABLET | Refills: 1 | Status: SHIPPED | OUTPATIENT
Start: 2019-05-09 | End: 2019-12-04

## 2019-05-09 RX ORDER — ATORVASTATIN CALCIUM 40 MG/1
TABLET, FILM COATED ORAL
Refills: 0 | COMMUNITY
Start: 2018-07-12 | End: 2019-11-01

## 2019-05-09 ASSESSMENT — MIFFLIN-ST. JEOR: SCORE: 1338.41

## 2019-05-09 ASSESSMENT — PAIN SCALES - GENERAL: PAINLEVEL: NO PAIN (0)

## 2019-05-09 NOTE — PATIENT INSTRUCTIONS
START THE NEW DOSE OF LISINOPRIL/HYDROCHLOROTHIAZIDE.      ASK YOUR UROLOGIST IF THERE IS ANYTHING THAT CAN BE DONE SURGICALLY TO FIX THE EJACULATION PROBLEMS    At [facility], we strive to deliver an exceptional experience to you, every time we see you.  If you receive a survey in the mail, please send us back your thoughts. We really do value your feedback.    Your care team:                            Family Medicine Internal Medicine   MD Reji Barlow MD Shantel Branch-Fleming, MD Katya Georgiev PA-C Megan Hill, JERICA Acosta MD Pediatrics   JENNIFER Segundo, MD Renetta Coburn APRN CNP   MD Shari Guzman MD Deborah Mielke, MD Kim Thein, APRN CNP      Clinic hours: Monday - Thursday 7 am-7 pm; Fridays 7 am-5 pm.   Urgent care: Monday - Friday 11 am-9 pm; Saturday and Sunday 9 am-5 pm.  Pharmacy : Monday -Thursday 8 am-8 pm; Friday 8 am-6 pm; Saturday and Sunday 9 am-5 pm.     Clinic: [phone]   Pharmacy: [phone]

## 2019-06-03 ENCOUNTER — ANCILLARY PROCEDURE (OUTPATIENT)
Dept: CARDIOLOGY | Facility: CLINIC | Age: 64
End: 2019-06-03
Attending: INTERNAL MEDICINE
Payer: COMMERCIAL

## 2019-06-03 DIAGNOSIS — R07.89 ATYPICAL CHEST PAIN: ICD-10-CM

## 2019-06-03 RX ADMIN — Medication 8 ML: at 08:15

## 2019-06-24 DIAGNOSIS — E11.69 TYPE 2 DIABETES MELLITUS WITH OTHER SPECIFIED COMPLICATION, WITHOUT LONG-TERM CURRENT USE OF INSULIN (H): ICD-10-CM

## 2019-06-24 NOTE — TELEPHONE ENCOUNTER
"Requested Prescriptions   Pending Prescriptions Disp Refills     metFORMIN (GLUCOPHAGE) 1000 MG tablet  Last Written Prescription Date:  04/17/19  Last Fill Quantity: 60,  # refills: 0   Last Office Visit with RONALD, NILTON or [facility] prescribing provider:  05/09/19Jolynn   Future Office Visit:    60 tablet 0     Sig: Take 1 tablet (1,000 mg) by mouth 2 times daily (with meals) PT has been changed to a different medication - cannot remember name -       Biguanide Agents Passed - 6/24/2019 10:35 AM        Passed - Blood pressure less than 140/90 in past 6 months     BP Readings from Last 3 Encounters:   05/09/19 118/70   03/14/19 119/74   01/07/19 137/82                 Passed - Patient has documented LDL within the past 12 mos.     Recent Labs   Lab Test 10/10/18  1239   LDL 93             Passed - Patient has had a Microalbumin in the past 15 mos.     Recent Labs   Lab Test 10/10/18  1239   MICROL 32   UMALCR 36.95*             Passed - Patient is age 10 or older        Passed - Patient has documented A1c within the specified period of time.     If HgbA1C is 8 or greater, it needs to be on file within the past 3 months.  If less than 8, must be on file within the past 6 months.     Recent Labs   Lab Test 04/22/19  0900   A1C 6.9*             Passed - Patient's CR is NOT>1.4 OR Patient's EGFR is NOT<45 within past 12 mos.     Recent Labs   Lab Test 04/22/19  0900   GFRESTIMATED >90   GFRESTBLACK >90       Recent Labs   Lab Test 04/22/19  0900   CR 0.82             Passed - Patient does NOT have a diagnosis of CHF.        Passed - Medication is active on med list        Passed - Recent (6 mo) or future (30 days) visit within the authorizing provider's specialty     Patient had office visit in the last 6 months or has a visit in the next 30 days with authorizing provider or within the authorizing provider's specialty.  See \"Patient Info\" tab in inbasket, or \"Choose Columns\" in Meds & Orders section of the refill " TRANSFER - IN REPORT:    Verbal report received from ORN & CRNA on Kike Toledo  being received from OR (unit) for routine post - op      Report consisted of patients Situation, Background, Assessment and   Recommendations(SBAR). Information from the following report(s) SBAR was reviewed with the receiving nurse. Opportunity for questions and clarification was provided. Assessment completed upon patients arrival to unit and care assumed. encounter.

## 2019-06-26 NOTE — TELEPHONE ENCOUNTER
Routing refill request to provider for review/approval because:    Please see med detail. Note says changed to different medication

## 2019-07-18 ENCOUNTER — OFFICE VISIT (OUTPATIENT)
Dept: FAMILY MEDICINE | Facility: CLINIC | Age: 64
End: 2019-07-18
Payer: COMMERCIAL

## 2019-07-18 VITALS
BODY MASS INDEX: 22.34 KG/M2 | DIASTOLIC BLOOD PRESSURE: 84 MMHG | HEART RATE: 98 BPM | HEIGHT: 66 IN | SYSTOLIC BLOOD PRESSURE: 157 MMHG | OXYGEN SATURATION: 99 % | TEMPERATURE: 98.4 F | WEIGHT: 139 LBS | RESPIRATION RATE: 18 BRPM

## 2019-07-18 DIAGNOSIS — J01.90 ACUTE SINUSITIS WITH SYMPTOMS > 10 DAYS: Primary | ICD-10-CM

## 2019-07-18 DIAGNOSIS — R05.9 COUGH: ICD-10-CM

## 2019-07-18 PROCEDURE — 99213 OFFICE O/P EST LOW 20 MIN: CPT | Performed by: PHYSICIAN ASSISTANT

## 2019-07-18 RX ORDER — BENZONATATE 200 MG/1
200 CAPSULE ORAL 3 TIMES DAILY PRN
Qty: 20 CAPSULE | Refills: 0 | Status: SHIPPED | OUTPATIENT
Start: 2019-07-18 | End: 2019-08-01

## 2019-07-18 RX ORDER — DOXYCYCLINE 100 MG/1
100 TABLET ORAL EVERY 12 HOURS
Qty: 14 TABLET | Refills: 0 | Status: SHIPPED | OUTPATIENT
Start: 2019-07-18 | End: 2019-11-01

## 2019-07-18 ASSESSMENT — MIFFLIN-ST. JEOR: SCORE: 1359.28

## 2019-07-18 ASSESSMENT — PAIN SCALES - GENERAL: PAINLEVEL: NO PAIN (0)

## 2019-07-18 NOTE — PROGRESS NOTES
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     Therapies Tried and outcome: Tussin-cough OTC, Robitussin, water        BPs at home usually ok    Allergies   Allergen Reactions     Hydrocodone-Acetaminophen      syncope     Insulin Glargine      Itchy rash     No Clinical Screening - See Comments      Intolerance to this     Oxycodone      States he passed out/fell after taking it       Patient Active Problem List   Diagnosis     Erectile dysfunction     PC (prostate cancer) (H)     Decreased libido     HTN (hypertension)     Hyperlipidemia     Orthostatic hypotension     Rotator cuff tear arthropathy of right shoulder     Type 2 diabetes mellitus with other specified complication, without long-term current use of insulin (H)     Gout     Allergic rhinitis     Insomnia     Lower urinary tract symptoms (LUTS)     Cervicalgia       Past Medical History:   Diagnosis Date     Diabetes type 2, controlled (H)      HTN (hypertension)      Hyperlipidemia          Current Outpatient Medications on File Prior to Visit:  Acetaminophen (TYLENOL PO) Take  by mouth as needed.   aspirin 81 MG tablet Take 1 tablet by mouth daily.   ASSURE COMFORT LANCETS 30G MISC    atorvastatin (LIPITOR) 40 MG tablet    atorvastatin (LIPITOR) 80 MG tablet TK SS A T D   blood glucose (NO BRAND SPECIFIED) lancets standard Use to test blood sugar 1-4 times daily or as directed.    Blood Glucose Calibration (CARESENS CONTROL A) SOLN    blood glucose monitoring (NO BRAND SPECIFIED) meter device kit Use to test blood sugar 1-4 times daily or as directed. Per insurance: One touch   blood glucose monitoring (NO BRAND SPECIFIED) test strip Use to test blood sugars 1-4 times daily or as directed. Per insurance   Blood Glucose Monitoring Suppl (CARESENS N GLUCOSE SYSTEM) MARIAELENA    CARESENS N GLUCOSE TEST test strip    dapagliflozin (FARXIGA) 10 MG TABS tablet Take 1 tablet (10 mg) by mouth daily   glipiZIDE (GLUCOTROL XL) 5 MG 24 hr tablet TK 1 T PO D IN THE MORNING WITH BREAKFAST   indomethacin (INDOCIN) 50 MG capsule    Lancet Devices (ADJUSTABLE LANCING DEVICE) MISC    lisinopril-hydrochlorothiazide (PRINZIDE/ZESTORETIC) 20-12.5 MG tablet Take 1 tablet by mouth daily   lisinopril-hydrochlorothiazide (PRINZIDE/ZESTORETIC) 20-25 MG tablet Take 1 tablet by mouth daily   Loratadine (CLARITIN PO) Take  by mouth as needed.   metFORMIN (GLUCOPHAGE) 1000 MG tablet Take 1 tablet (1,000 mg) by mouth 2 times daily (with meals)   Multiple Vitamins-Minerals (ROGERS MULTI MEN PO) Take 1 tablet by mouth daily.   niacin (NIASPAN) 500 MG CR tablet Take 1 tablet by mouth daily.   oxybutynin ER (DITROPAN XL) 10 MG 24 hr tablet Take 1 tablet (10 mg) by mouth daily   pravastatin (PRAVACHOL) 40 MG tablet Take 1 tablet (40 mg) by mouth daily   pravastatin (PRAVACHOL) 40 MG tablet TK ONE T PO D   sildenafil (VIAGRA) 100 MG tablet Take 1 tablet (100 mg) by mouth daily as needed (take 1 hour before intercourse)   tamsulosin (FLOMAX) 0.4 MG capsule Take 1 capsule (0.4 mg) by mouth daily   tiZANidine (ZANAFLEX) 2 MG tablet Take 1 tablet (2 mg) by mouth 3 times daily as needed for muscle spasms   VIAGRA 100 MG tablet Take 1 tablet (100 mg) by mouth daily as needed (sexual activity)     Current Facility-Administered Medications on File Prior to Visit:  sodium chloride (PF) 0.9% PF flush 20 mL       Social History     Tobacco Use  "    Smoking status: Former Smoker     Last attempt to quit: 1991     Years since quittin.6     Smokeless tobacco: Never Used   Substance Use Topics     Alcohol use: Yes       History reviewed. No pertinent family history.    ROS:  Consitutional: As above  ENT: As above  Respiratory: As above    OBJECTIVE:  /84 (BP Location: Left arm, Patient Position: Chair, Cuff Size: Adult Regular)   Pulse 98   Temp 98.4  F (36.9  C) (Oral)   Resp 18   Ht 1.67 m (5' 5.75\")   Wt 63 kg (139 lb)   SpO2 99%   BMI 22.61 kg/m    GENERAL APPEARANCE: healthy, alert and no distress  EYES: conjunctiva clear  HENT:  TMs w/o erythema, effusion or bulging.  Nose and mouth without ulcers, erythema or lesions.  NO tonsillar enlargement erythema or exudates.   NECK: supple, nontender, no lymphadenopathy  RESP: lungs clear to auscultation - no rales, rhonchi or wheezes  CV: regular rates and rhythm, normal S1 S2, no murmur noted  NEURO: awake, alert          ASSESSMENT: Well appearing.    ICD-10-CM    1. Acute sinusitis with symptoms > 10 days J01.90 doxycycline monohydrate (ADOXA) 100 MG tablet   2. Cough R05 benzonatate (TESSALON) 200 MG capsule         PLAN:  Lots of rest and fluids.  RTC if any worsening symptoms or if not improving.    Ja Malhotra PA-C           "

## 2019-07-18 NOTE — PATIENT INSTRUCTIONS
At [facility], we strive to deliver an exceptional experience to you, every time we see you.  If you receive a survey in the mail, please send us back your thoughts. We really do value your feedback.    Based on your medical history, these are the current health maintenance/preventive care services that you are due for (some may have been done at this visit.)  Health Maintenance Due   Topic Date Due     ADVANCE CARE PLANNING  1955     EYE EXAM  1955     ZOSTER IMMUNIZATION (1 of 2) 11/24/2004         Suggested websites for health information:  Www.[employer].gopogo : Up to date and easily searchable information on multiple topics.  Www.[employer].gov : medication info, interactive tutorials, watch real surgeries online  Www.familydoctor.org : good info from the Academy of Family Physicians  Www.cdc.gov : public health info, travel advisories, epidemics (H1N1)  Www.aap.org : children's health info, normal development, vaccinations  Www.health.[facility].[place].us : [place] dept of health, public health issues in [place], N1N1    Your care team:                            Family Medicine Internal Medicine   MD Reji Barlow MD Shantel Branch-Fleming, MD Katya Georgiev PA-C Nam Ho, MD Pediatrics   JENNIFER Sgeundo, MD Shari Brenner CNP, MD Deborah Mielke, MD Kim Thein, APRN [facility]      Clinic hours: Monday - Thursday 7 am-7 pm; Fridays 7 am-5 pm.   Urgent care: Monday - Friday 11 am-9 pm; Saturday and Sunday 9 am-5 pm.  Pharmacy : Monday -Thursday 8 am-8 pm; Friday 8 am-6 pm; Saturday and Sunday 9 am-5 pm.     Clinic: [phone]   Pharmacy: [phone]      Sinusitis [Abx Tx]    The sinuses are air-filled spaces within the bones of the face. They connect to the inside of the nose. Sinusitis is an inflammation of the tissue lining the sinus cavity. Sinus inflammation can occur during a cold or hay-fever (allergies  to pollens and other particles in the air) and cause symptoms of sinus congestion and fullness. A sinus infection causes fever, headache and facial pain. There is usually green or yellow drainage from the nose or into the back of the throat (post-nasal drip). Antibiotics are prescribed to treat this condition.  Home Care:  Drink plenty of water, hot tea, and other liquids to stay well hydrated. This thins the mucus and promotes sinus drainage.  Apply heat to the painful areas of the face. Use a towel soaked in hot water. Or,  the shower and direct the hot spray onto your face. This is a good way to inhale warm water vapor and get heat on your face at the same time. (Cover your mouth and nose with your hands so you can still breathe as you do this.)  Use a vaporizer with products such as Vicks VapoRub (contains menthol) at night. Suck on peppermint, menthol or eucalyptus hard candies during the day.  An expectorant containing guaifenesin (such as Robitussin), helps to thin the mucus and promote drainage from the sinuses.  Over-the-counter decongestants may be used unless a similar medicine was prescribed. Nasal sprays work the fastest. Use one that contains phenylephrine (Pedro-synephrine, Sinex and others) or oxymetazoline (Afrin). First blow the nose gently to remove mucus, then apply the drops. Do not use these medicines more often than directed on the label or for more than three days or symptoms may worsen. You may also use tablets containing pseudoephedrine (Sudafed). Many sinus remedies combine ingredients, which may increase side effects. Read the labels or ask the pharmacist for help. NOTE: Persons with high blood pressure should not use decongestants. They can raise blood pressure.  Antihistamines are useful if allergies are a cause of your sinusitis. The mildest one is chlorpheniramine (available without a prescription). The dose for adults is 8-12mg three times a day. [NOTE: Do not use  chlorpheniramine if you have glaucoma or if you are a man with trouble urinating due to an enlarged prostate.] Claritin (loratidine) is an antihistamine that causes less drowsiness and is a good alternative for daytime use.  Do not use nasal rinses or irrigation during an acute sinus infection, unless advised by your doctor. Rinsing may spread the infection to other sinuses.  You may use acetaminophen (Tylenol) or ibuprofen (Motrin, Advil) to control pain, unless another pain medicine was prescribed. [ NOTE: If you have chronic liver or kidney disease or ever had a stomach ulcer, talk with your doctor before using these medicines.] (Aspirin should never be used in anyone under 18 years of age who is ill with a fever. It may cause severe liver damage.)  Finish the full course, even if you are feeling better after a few days.  Follow Up  with your doctor or this facility in one week or as instructed by our staff if not improving.  Get Prompt Medical Attention  if any of the following occur:  Facial pain or headache becomes more severe  Stiff neck  Unusual drowsiness or confusion, or not acting like your normal self  Swelling of the forehead or eyelids  Vision problems including blurred or double vision  Fever of 100.4 F (38 C) or higher, or as directed by your healthcare provider  Seizure    [id number] [facility], [address] [place] [address], [place] [id number]. All rights reserved. This information is not intended as a substitute for professional medical care. Always follow your healthcare professional's instructions.

## 2019-07-30 DIAGNOSIS — R05.9 COUGH: ICD-10-CM

## 2019-07-30 NOTE — TELEPHONE ENCOUNTER
Requested Prescriptions   Pending Prescriptions Disp Refills     benzonatate (TESSALON) 200 MG capsule        Last Written Prescription Date:  07/18/19  Last Fill Quantity: 20,   # refills: 0  Last Office Visit: 07/18/19Jolynn  Future Office visit:       Routing refill request to provider for review/approval because:  Drug not on the [facility], P or [facility] refill protocol or controlled substance 20 capsule 0     Sig: Take 1 capsule (200 mg) by mouth 3 times daily as needed for cough       There is no refill protocol information for this order

## 2019-07-31 ENCOUNTER — ANCILLARY PROCEDURE (OUTPATIENT)
Dept: GENERAL RADIOLOGY | Facility: CLINIC | Age: 64
End: 2019-07-31
Attending: PHYSICIAN ASSISTANT
Payer: COMMERCIAL

## 2019-07-31 ENCOUNTER — OFFICE VISIT (OUTPATIENT)
Dept: FAMILY MEDICINE | Facility: CLINIC | Age: 64
End: 2019-07-31
Payer: COMMERCIAL

## 2019-07-31 VITALS
TEMPERATURE: 98.8 F | BODY MASS INDEX: 22.38 KG/M2 | RESPIRATION RATE: 24 BRPM | SYSTOLIC BLOOD PRESSURE: 134 MMHG | WEIGHT: 137.6 LBS | HEART RATE: 129 BPM | DIASTOLIC BLOOD PRESSURE: 68 MMHG | OXYGEN SATURATION: 97 %

## 2019-07-31 DIAGNOSIS — R05.9 COUGH: Primary | ICD-10-CM

## 2019-07-31 DIAGNOSIS — J98.4 MILD OBSTRUCTION OF PULMONARY AIRFLOW: ICD-10-CM

## 2019-07-31 DIAGNOSIS — J90 PLEURAL EFFUSION: ICD-10-CM

## 2019-07-31 DIAGNOSIS — R05.9 COUGH: ICD-10-CM

## 2019-07-31 LAB
FEF 25/75: NORMAL
FEV-1: NORMAL
FEV1/FVC: NORMAL
FVC: NORMAL

## 2019-07-31 PROCEDURE — 71046 X-RAY EXAM CHEST 2 VIEWS: CPT

## 2019-07-31 PROCEDURE — 87798 DETECT AGENT NOS DNA AMP: CPT | Mod: 91 | Performed by: PHYSICIAN ASSISTANT

## 2019-07-31 PROCEDURE — 94010 BREATHING CAPACITY TEST: CPT | Performed by: PHYSICIAN ASSISTANT

## 2019-07-31 PROCEDURE — 99214 OFFICE O/P EST MOD 30 MIN: CPT | Mod: 25 | Performed by: PHYSICIAN ASSISTANT

## 2019-07-31 NOTE — LETTER
[address] [address]  Phone: [phone]    July 31, 2019        August Ray  [address]   [facility] [address]          To whom it may concern:    RE: August Ray    Patient was seen and treated today at our clinic.  Patient excused from work today and tomorrow.    Patient may return to work without restrictions after that.          Please contact me for questions or concerns.      Sincerely,        JESSI Latif

## 2019-07-31 NOTE — PROGRESS NOTES
Subjective     August Ray is a 63 year old male who presents to clinic today for the following health issues:    HPI   Chief Complaint   Patient presents with     RECHECK     Cough   seen 7/18 , tx;d with doxy for sinusitis- still having running nose  and cough though it seems to patient that the cough triggers the runny nose and teary eyes.,  cough worse at night.      Now with about 10 weeks of cough    Quit smoking 30 years ago        Allergies   Allergen Reactions     Hydrocodone-Acetaminophen      syncope     Insulin Glargine      Itchy rash     No Clinical Screening - See Comments      Intolerance to this     Oxycodone      States he passed out/fell after taking it       Patient Active Problem List   Diagnosis     Erectile dysfunction     PC (prostate cancer) (H)     Decreased libido     HTN (hypertension)     Hyperlipidemia     Orthostatic hypotension     Rotator cuff tear arthropathy of right shoulder     Type 2 diabetes mellitus with other specified complication, without long-term current use of insulin (H)     Gout     Allergic rhinitis     Insomnia     Lower urinary tract symptoms (LUTS)     Cervicalgia     Mild obstruction of pulmonary airflow (H)       Past Medical History:   Diagnosis Date     Diabetes type 2, controlled (H)      HTN (hypertension)      Hyperlipidemia          Current Outpatient Medications on File Prior to Visit:  Acetaminophen (TYLENOL PO) Take  by mouth as needed.   aspirin 81 MG tablet Take 1 tablet by mouth daily.   ASSURE COMFORT LANCETS 30G MISC    atorvastatin (LIPITOR) 40 MG tablet    atorvastatin (LIPITOR) 80 MG tablet TK SS A T D   benzonatate (TESSALON) 200 MG capsule Take 1 capsule (200 mg) by mouth 3 times daily as needed for cough   blood glucose (NO BRAND SPECIFIED) lancets standard Use to test blood sugar 1-4 times daily or as directed.   Blood Glucose Calibration (CARESENS CONTROL A) SOLN    blood glucose monitoring (NO BRAND SPECIFIED) meter device kit  Use to test blood sugar 1-4 times daily or as directed. Per insurance: One touch   blood glucose monitoring (NO BRAND SPECIFIED) test strip Use to test blood sugars 1-4 times daily or as directed. Per insurance   Blood Glucose Monitoring Suppl (CARESENS N GLUCOSE SYSTEM) MARIAELENA    CARESENS N GLUCOSE TEST test strip    dapagliflozin (FARXIGA) 10 MG TABS tablet Take 1 tablet (10 mg) by mouth daily   glipiZIDE (GLUCOTROL XL) 5 MG 24 hr tablet TK 1 T PO D IN THE MORNING WITH BREAKFAST   indomethacin (INDOCIN) 50 MG capsule    Lancet Devices (ADJUSTABLE LANCING DEVICE) MISC    lisinopril-hydrochlorothiazide (PRINZIDE/ZESTORETIC) 20-12.5 MG tablet Take 1 tablet by mouth daily   lisinopril-hydrochlorothiazide (PRINZIDE/ZESTORETIC) 20-25 MG tablet Take 1 tablet by mouth daily   Loratadine (CLARITIN PO) Take  by mouth as needed.   metFORMIN (GLUCOPHAGE) 1000 MG tablet Take 1 tablet (1,000 mg) by mouth 2 times daily (with meals)   Multiple Vitamins-Minerals (ROGERS MULTI MEN PO) Take 1 tablet by mouth daily.   niacin (NIASPAN) 500 MG CR tablet Take 1 tablet by mouth daily.   oxybutynin ER (DITROPAN XL) 10 MG 24 hr tablet Take 1 tablet (10 mg) by mouth daily   pravastatin (PRAVACHOL) 40 MG tablet Take 1 tablet (40 mg) by mouth daily   pravastatin (PRAVACHOL) 40 MG tablet TK ONE T PO D   sildenafil (VIAGRA) 100 MG tablet Take 1 tablet (100 mg) by mouth daily as needed (take 1 hour before intercourse)   tamsulosin (FLOMAX) 0.4 MG capsule Take 1 capsule (0.4 mg) by mouth daily   tiZANidine (ZANAFLEX) 2 MG tablet Take 1 tablet (2 mg) by mouth 3 times daily as needed for muscle spasms   VIAGRA 100 MG tablet Take 1 tablet (100 mg) by mouth daily as needed (sexual activity)   [] doxycycline monohydrate (ADOXA) 100 MG tablet Take 1 tablet (100 mg) by mouth every 12 hours for 7 days     Current Facility-Administered Medications on File Prior to Visit:  sodium chloride (PF) 0.9% PF flush 20 mL       Social History     Tobacco Use      Smoking status: Former Smoker     Last attempt to quit: 1991     Years since quittin.6     Smokeless tobacco: Never Used   Substance Use Topics     Alcohol use: Yes       No family history on file.    ROS:  Consitutional: As above  ENT: As above  Respiratory: As above    OBJECTIVE:  /68   Pulse 129   Temp 98.8  F (37.1  C) (Oral)   Resp 24   Wt 62.4 kg (137 lb 9.6 oz)   SpO2 97%   BMI 22.38 kg/m    GENERAL APPEARANCE: healthy, alert and no distress  EYES: conjunctiva clear  HENT:    TMs w/o erythema, effusion or bulging.  Nose and mouth without ulcers, erythema or lesions.  NO tonsillar enlargement erythema or exudates.   NECK: supple, nontender, no lymphadenopathy  RESP: lungs clear to auscultation - no rales, rhonchi or wheezes  CV: regular rates and rhythm, normal S1 S2, no murmur noted  NEURO: awake, alert      My independent read of XR is No changes from 10/2018 CXR (which was read normal) but I question possible stable left pleural effusion on both studies?    Spirometry- mild obstructions      ASSESSMENT: Well appearing. Could be non responsive sinusitis but per patient it seems more like severity of  cough is triggering ENT symptoms than vice versa.  Spirometry mildly positive but doesn't explain acute symptoms w/o any wheezing.      ICD-10-CM    1. Cough R05 XR Chest 2 Views     Spirometry, Breathing Capacity: Normal Order, Clinic Performed     B. pertussis/parapertussis PCR-NP   2. Mild obstruction of pulmonary airflow (H) J44.9    3. Pleural effusion J90 CT Chest w/o Contrast         PLAN:  Lots of rest and fluids.  RTC if any worsening symptoms or if not improving.    Ja Malhotra PA-C

## 2019-07-31 NOTE — PATIENT INSTRUCTIONS
TRY over the counter DELSYM FOR COUGH.      After we get pertussis results, if negative Please contact [facility]  to schedule your CT scan.        Patient Education     Chronic Cough with Uncertain Cause (Adult)    Everyone has had a cough as part of the common cold, flu, or bronchitis. This kind of cough occurs along with an achy feeling, low-grade fever, nasal and sinus congestion, and a scratchy or sore throat. This usually gets better in 2 to 3 weeks. A cough that lasts longer than 3 weeks may be due to other causes. Your healthcare provider may refer to this as a chronic cough.  If your cough does not improve over the next 2 weeks, further testing may be needed. Follow up with your healthcare provider as advised. Cough suppressants may be recommended. Based on your exam today, the exact cause of your cough is not certain. Below are some common causes for persistent cough.  Smoker's cough  Smoker s cough doesn t go away. If you continue to smoke, it only gets worse. The cough is from irritation in the air passages. Talk to your healthcare provider about quitting. Medicines or nicotine-replacement products, like gum or the patch, may make quitting easier.  Postnasal drip  A cough that is worse at night may be due to postnasal drip. Excess mucus in the nose drains from the back of your nose to your throat. This triggers the cough reflex. Postnasal drip may be due to a sinus infection or allergy. Common allergens include dust, tobacco smoke (both inhaled and secondhand smoke), environmental pollutants, pollen, mold, pets, cleaning agents, room deodorizers, and chemical fumes. Over-the-counter antihistamines or decongestants may be helpful for allergies. A sinus infection may requires antibiotic treatment. See your healthcare provider if symptoms continue.  Medicines  Certain prescribed medicines can cause a chronic cough in some people:    ACE inhibitors for high blood pressure. These include  benazepril, captopril, enalapril, fosinopril, lisinopril, quinapril, ramipril, and others.    Beta-blockers for high blood pressure and other conditions. These include propranolol, atenolol, metoprolol, nadolol, and others.  Let your healthcare provider know if you are taking any of these. The chronic cough may mean your medicine needs to be changed.  Asthma  Cough may be the only sign of mild asthma. You may have tests to find out if asthma is causing your cough. You may also take asthma medicine on a trial basis.  Acid reflux (heartburn, GERD)  The esophagus is the tube that carries food from the mouth to the stomach. A valve at its lower end prevents stomach acids from flowing upward. If this valve does not work properly, acid from the stomach enters the esophagus. This may cause a burning pain in the upper abdomen or lower chest, belching, or cough. Symptoms are often worse when lying flat. Avoid eating or drinking before bedtime. Try using extra pillows to raise your upper body, or place 4-inch blocks under the head of your bed. You may try an over-the-counter (OTC) antacid or an acid-blocking medicine such as famotidine, cimetidine, ranitidine, esomeprazole, lansoprazole, or omeprazole. Stronger medicines for this condition can be prescribed by your healthcare provider. Ask your healthcare provider which OTC medicine to use. Depending on your current medicines, some OTC medicines may cause drug interactions and should be avoided.  Follow-up care  Follow up with your healthcare provider, or as advised, if your cough does not improve. Further testing may be needed.  Note: If an X-ray was taken, a specialist will review it. You will be notified of any new findings that may affect your care.  When to seek medical advice  Call your healthcare provider right away if any of these occur:    Mild wheezing or difficulty breathing    Fever of 100.4 F (38 C) or higher, or as directed by your healthcare  provider    Unexpected weight loss    Coughing up large amounts of colored sputum or blood-tinged sputum    Night sweats (sheets and pajamas get soaking wet)  Call 911  Call 911 if any of these occur:    Coughing up blood    Moderate to severe trouble breathing or wheezing  Date Last Reviewed: 6/1/2018 2000-2018 [employer]. [address] [place] [address]. All rights reserved. This information is not intended as a substitute for professional medical care. Always follow your healthcare professional's instructions.

## 2019-08-01 ENCOUNTER — TELEPHONE (OUTPATIENT)
Dept: FAMILY MEDICINE | Facility: CLINIC | Age: 64
End: 2019-08-01

## 2019-08-01 LAB
B PARAPERT DNA SPEC QL NAA+PROBE: NOT DETECTED
B PERT DNA SPEC QL NAA+PROBE: NOT DETECTED
BORDETELLA COMMENT: NORMAL

## 2019-08-01 RX ORDER — BENZONATATE 200 MG/1
200 CAPSULE ORAL 3 TIMES DAILY PRN
Qty: 20 CAPSULE | Refills: 0 | Status: SHIPPED | OUTPATIENT
Start: 2019-08-01 | End: 2019-08-15

## 2019-08-01 NOTE — TELEPHONE ENCOUNTER
Please contact patient, let him know his pertussis swab was negative and he should schedul the CT scan ordered at his recent visit  Ja Malhotra PA-C

## 2019-08-01 NOTE — TELEPHONE ENCOUNTER
This writer attempted to contact patient on 08/01/19      Reason for call result and left detailed message of negative result and to schedule CT, number to imaging was left on voicemail as well.      If patient calls back:   1st floor [facility] Team (MA/TC) called. Inform patient that someone from the team will contact them, document that pt called and route to care team.         Teri Adhikari MA

## 2019-08-01 NOTE — TELEPHONE ENCOUNTER
Routing refill request to provider for review/approval because:  Drug not on the [facility] refill protocol       Parveen Chu RN, BSN, PHN

## 2019-08-06 ENCOUNTER — HOSPITAL ENCOUNTER (OUTPATIENT)
Dept: CT IMAGING | Facility: CLINIC | Age: 64
Discharge: HOME | End: 2019-08-06
Attending: PHYSICIAN ASSISTANT | Admitting: PHYSICIAN ASSISTANT
Payer: COMMERCIAL

## 2019-08-06 DIAGNOSIS — J90 PLEURAL EFFUSION: ICD-10-CM

## 2019-08-06 PROCEDURE — 71250 CT THORAX DX C-: CPT

## 2019-08-07 ENCOUNTER — TELEPHONE (OUTPATIENT)
Dept: FAMILY MEDICINE | Facility: CLINIC | Age: 64
End: 2019-08-07

## 2019-08-07 DIAGNOSIS — J40 BRONCHITIS: Primary | ICD-10-CM

## 2019-08-07 DIAGNOSIS — D64.9 ANEMIA, UNSPECIFIED TYPE: ICD-10-CM

## 2019-08-07 DIAGNOSIS — Q78.2 OSTEOSCLEROSIS: ICD-10-CM

## 2019-08-07 RX ORDER — ALBUTEROL SULFATE 90 UG/1
2 AEROSOL, METERED RESPIRATORY (INHALATION) EVERY 4 HOURS PRN
Qty: 1 INHALER | Refills: 0 | Status: SHIPPED | OUTPATIENT
Start: 2019-08-07 | End: 2019-11-01

## 2019-08-07 RX ORDER — PREDNISONE 10 MG/1
TABLET ORAL
Qty: 12 TABLET | Refills: 0 | Status: SHIPPED | OUTPATIENT
Start: 2019-08-07 | End: 2019-08-15

## 2019-08-07 NOTE — TELEPHONE ENCOUNTER
Please call pt with CT results. His cough is most likely from some combination of acute bronchitis and mild COPD/emphysema.  I have sent an albuterol inhaler  And some prednisone for him to try to see if it helps.  He should only take these if he is still coughing.        There was also an incidental finding of unusual formations of the bones around his chest of uncertain significance.  I have ordered some FASTING labs to look into this further.      Ja Malhotra PA-C      Recent calciums, creatines WNL, CMC showed mild anemia.

## 2019-08-07 NOTE — TELEPHONE ENCOUNTER
Called and informed the patient of the results of CT as written below. Also scheduled the patient for lab appointment.    Graciela Wilson RN, [facility]

## 2019-08-08 ENCOUNTER — TELEPHONE (OUTPATIENT)
Dept: URGENT CARE | Facility: URGENT CARE | Age: 64
End: 2019-08-08

## 2019-08-08 NOTE — TELEPHONE ENCOUNTER
Panel Management Review   One phone call and send letter if unable to reach them or [email or web address] message and send letter if not read after 2 weeks (You will get a message to your inbasket)      BP Readings from Last 1 Encounters:   07/31/19 134/68    ,   Lab Results   Component Value Date    A1C 6.9 04/22/2019    A1C 7.3 10/10/2018   , Visit date not found    Health Maintenance Due   Topic Date Due     SPIROMETRY  1955     ADVANCE CARE PLANNING  1955     COPD ACTION PLAN  1955     EYE EXAM  1955     ZOSTER IMMUNIZATION (1 of 2) 11/24/2004        Fail List measure: labs and eye exam        Patient is due/failing the following:   Labs and eye exam    Action needed:   Get eye exam done     Type of outreach:    Phone, spoke to patient.  Patient states he hasnt had an eye exam. advised patient to get one. Patient had lab appointment made for tomorrow.    Questions for provider review:    None                                                                                       Chart routed to n/a Matthew Abraham

## 2019-08-09 DIAGNOSIS — D64.9 ANEMIA, UNSPECIFIED TYPE: ICD-10-CM

## 2019-08-09 DIAGNOSIS — Q78.2 OSTEOSCLEROSIS: ICD-10-CM

## 2019-08-09 LAB
ALBUMIN SERPL-MCNC: 4 G/DL (ref 3.4–5)
ALP SERPL-CCNC: 149 U/L (ref 40–150)
ALT SERPL W P-5'-P-CCNC: 30 U/L (ref 0–70)
ANION GAP SERPL CALCULATED.3IONS-SCNC: 6 MMOL/L (ref 3–14)
AST SERPL W P-5'-P-CCNC: 28 U/L (ref 0–45)
BASOPHILS # BLD AUTO: 0 10E9/L (ref 0–0.2)
BASOPHILS NFR BLD AUTO: 0.3 %
BILIRUB SERPL-MCNC: 0.7 MG/DL (ref 0.2–1.3)
BUN SERPL-MCNC: 16 MG/DL (ref 7–30)
CALCIUM SERPL-MCNC: 9.6 MG/DL (ref 8.5–10.1)
CHLORIDE SERPL-SCNC: 102 MMOL/L (ref 94–109)
CO2 SERPL-SCNC: 28 MMOL/L (ref 20–32)
CREAT SERPL-MCNC: 0.96 MG/DL (ref 0.66–1.25)
DEPRECATED CALCIDIOL+CALCIFEROL SERPL-MC: 39 UG/L (ref 20–75)
DIFFERENTIAL METHOD BLD: ABNORMAL
EOSINOPHIL # BLD AUTO: 0.1 10E9/L (ref 0–0.7)
EOSINOPHIL NFR BLD AUTO: 1.6 %
ERYTHROCYTE [DISTWIDTH] IN BLOOD BY AUTOMATED COUNT: 14.5 % (ref 10–15)
GFR SERPL CREATININE-BSD FRML MDRD: 83 ML/MIN/{1.73_M2}
GLUCOSE SERPL-MCNC: 135 MG/DL (ref 70–99)
HCT VFR BLD AUTO: 37.6 % (ref 40–53)
HGB BLD-MCNC: 12.4 G/DL (ref 13.3–17.7)
LYMPHOCYTES # BLD AUTO: 2 10E9/L (ref 0.8–5.3)
LYMPHOCYTES NFR BLD AUTO: 32.9 %
MAGNESIUM SERPL-MCNC: 1.8 MG/DL (ref 1.6–2.3)
MCH RBC QN AUTO: 26.3 PG (ref 26.5–33)
MCHC RBC AUTO-ENTMCNC: 33 G/DL (ref 31.5–36.5)
MCV RBC AUTO: 80 FL (ref 78–100)
MONOCYTES # BLD AUTO: 0.6 10E9/L (ref 0–1.3)
MONOCYTES NFR BLD AUTO: 9.7 %
NEUTROPHILS # BLD AUTO: 3.4 10E9/L (ref 1.6–8.3)
NEUTROPHILS NFR BLD AUTO: 55.5 %
PLATELET # BLD AUTO: 217 10E9/L (ref 150–450)
POTASSIUM SERPL-SCNC: 3.5 MMOL/L (ref 3.4–5.3)
PROT SERPL-MCNC: 8.7 G/DL (ref 6.8–8.8)
PTH-INTACT SERPL-MCNC: 70 PG/ML (ref 18–80)
RBC # BLD AUTO: 4.72 10E12/L (ref 4.4–5.9)
RETICS # AUTO: 60.3 10E9/L (ref 25–95)
RETICS/RBC NFR AUTO: 1.3 % (ref 0.5–2)
SODIUM SERPL-SCNC: 136 MMOL/L (ref 133–144)
WBC # BLD AUTO: 6.1 10E9/L (ref 4–11)

## 2019-08-09 PROCEDURE — 82306 VITAMIN D 25 HYDROXY: CPT | Performed by: PHYSICIAN ASSISTANT

## 2019-08-09 PROCEDURE — 85060 BLOOD SMEAR INTERPRETATION: CPT | Performed by: PHYSICIAN ASSISTANT

## 2019-08-09 PROCEDURE — 83735 ASSAY OF MAGNESIUM: CPT | Performed by: PHYSICIAN ASSISTANT

## 2019-08-09 PROCEDURE — 80053 COMPREHEN METABOLIC PANEL: CPT | Performed by: PHYSICIAN ASSISTANT

## 2019-08-09 PROCEDURE — 36415 COLL VENOUS BLD VENIPUNCTURE: CPT | Performed by: PHYSICIAN ASSISTANT

## 2019-08-09 PROCEDURE — 83970 ASSAY OF PARATHORMONE: CPT | Performed by: PHYSICIAN ASSISTANT

## 2019-08-09 PROCEDURE — 85045 AUTOMATED RETICULOCYTE COUNT: CPT | Performed by: PHYSICIAN ASSISTANT

## 2019-08-09 PROCEDURE — 85025 COMPLETE CBC W/AUTO DIFF WBC: CPT | Performed by: PHYSICIAN ASSISTANT

## 2019-08-12 LAB — COPATH REPORT: NORMAL

## 2019-08-13 ENCOUNTER — TELEPHONE (OUTPATIENT)
Dept: FAMILY MEDICINE | Facility: CLINIC | Age: 64
End: 2019-08-13

## 2019-08-13 DIAGNOSIS — J40 BRONCHITIS: ICD-10-CM

## 2019-08-13 DIAGNOSIS — R91.8 ABNORMAL CT SCAN OF LUNG: Primary | ICD-10-CM

## 2019-08-13 DIAGNOSIS — R05.9 COUGH: ICD-10-CM

## 2019-08-13 NOTE — TELEPHONE ENCOUNTER
Requested Prescriptions   Pending Prescriptions Disp Refills     benzonatate (TESSALON) 200 MG capsule        Last Written Prescription Date:  08/01/19  Last Fill Quantity: 20,   # refills: 0  Last Office Visit: 07/31/19Jolynn  Future Office visit:       Routing refill request to provider for review/approval because:  Drug not on the [facility], [facility] or [facility] refill protocol or controlled substance 20 capsule 0     Sig: Take 1 capsule (200 mg) by mouth 3 times daily as needed for cough       There is no refill protocol information for this order        predniSONE (DELTASONE) 10 MG tablet  Last Written Prescription Date:  08/07/19  Last Fill Quantity: 12,  # refills: 0   Last Office Visit with [facility], [facility] or [facility] prescribing provider:  07/31/19Jolynn         Routing refill request to provider for review/approval because:  Drug not on the [facility], [facility] or [facility] refill protocol or controlled substance   Future Office Visit:    12 tablet 0     Sig: 3 tabs PO QD x 2 days then 2 tabs PO QD x 2 days then 1 tab PO QD x 2 days       There is no refill protocol information for this order

## 2019-08-13 NOTE — TELEPHONE ENCOUNTER
This writer attempted to contact Adelfo on 08/13/19      Reason for call results and left message.      If patient calls back:   Registered Nurse called. Follow Triage Call workflow        Shirlene Graham RN

## 2019-08-13 NOTE — TELEPHONE ENCOUNTER
Please contact patient, His labs are all basically normal ( he has a very mild anemia which is unchanged since last year).  As the radiologist reports his CT findings may just be upper range of normal, I think the next step is to recheck a CT in 6 months.    Ja Malhotra PA-C

## 2019-08-14 RX ORDER — ALBUTEROL SULFATE 90 UG/1
2 AEROSOL, METERED RESPIRATORY (INHALATION) EVERY 4 HOURS PRN
Qty: 1 INHALER | Refills: 0 | Status: SHIPPED | OUTPATIENT
Start: 2019-08-14 | End: 2019-11-01

## 2019-08-14 NOTE — TELEPHONE ENCOUNTER
Called and spoke with patient. Relayed results and provider plan to repeat Chest CT in 6 months. Patient verbalized understanding and agreed with plans.    Does report continued cough, does not feel any better at this time, cough seems to be worse during the daytime, better at night. Has tried all recommended therapies and nothing is helping. Tessalon caps seem to help the most, however do not help that much.   Was recently prescribed an inhaler to help with symptoms. Picked up from pharmacy and patient took home and found it was not working. Patient took back to pharmacy and they said was defective, will not spray the medication, and to call the  to have another one sent out. [employer] at [phone].   Unclear as to why pharmacy would not call to problem solve this, as they are ones who work with . Patient not comfortable calling, unsure of what to discuss with  or who to speak with. Slight language barrier and understanding involved. Patient unsure of what to do now, would like to trial inhaler but current one not working.   Writer did schedule OV with PCP for next week for a follow up, patient will return sooner if worsening symptoms. If improves or cough resolved prior to apt next week, patient will call and cancel.    Routing to provider to update, any other suggestions regarding inhaler?     Brunilda Robb RN

## 2019-08-14 NOTE — TELEPHONE ENCOUNTER
This writer attempted to contact Adelfo on 08/14/19      Reason for call provider  and left message.      If patient calls back:   Registered Nurse called. Follow Triage Call workflow        Shirlene Graham RN

## 2019-08-15 RX ORDER — BENZONATATE 200 MG/1
200 CAPSULE ORAL 3 TIMES DAILY PRN
Qty: 20 CAPSULE | Refills: 0 | Status: SHIPPED | OUTPATIENT
Start: 2019-08-15 | End: 2019-08-27

## 2019-08-15 RX ORDER — PREDNISONE 10 MG/1
TABLET ORAL
Qty: 12 TABLET | Refills: 0 | Status: SHIPPED | OUTPATIENT
Start: 2019-08-15

## 2019-08-15 NOTE — TELEPHONE ENCOUNTER
Patient contacted and informed of the below per provider documentation. Patient says he will call the pharmacy and ask them to send a message to his insurance company so they cover the cost of the inhaler.     Emi Major RN

## 2019-08-22 ENCOUNTER — OFFICE VISIT (OUTPATIENT)
Dept: FAMILY MEDICINE | Facility: CLINIC | Age: 64
End: 2019-08-22
Payer: COMMERCIAL

## 2019-08-22 VITALS
RESPIRATION RATE: 18 BRPM | HEART RATE: 100 BPM | DIASTOLIC BLOOD PRESSURE: 84 MMHG | TEMPERATURE: 99 F | WEIGHT: 136 LBS | SYSTOLIC BLOOD PRESSURE: 137 MMHG | BODY MASS INDEX: 21.86 KG/M2 | OXYGEN SATURATION: 98 % | HEIGHT: 66 IN

## 2019-08-22 DIAGNOSIS — R05.9 COUGH: ICD-10-CM

## 2019-08-22 DIAGNOSIS — N53.19 EJACULATORY DISORDER: ICD-10-CM

## 2019-08-22 DIAGNOSIS — H10.33 ACUTE CONJUNCTIVITIS OF BOTH EYES, UNSPECIFIED ACUTE CONJUNCTIVITIS TYPE: ICD-10-CM

## 2019-08-22 DIAGNOSIS — H66.002 NON-RECURRENT ACUTE SUPPURATIVE OTITIS MEDIA OF LEFT EAR WITHOUT SPONTANEOUS RUPTURE OF TYMPANIC MEMBRANE: Primary | ICD-10-CM

## 2019-08-22 PROCEDURE — 99214 OFFICE O/P EST MOD 30 MIN: CPT | Performed by: PHYSICIAN ASSISTANT

## 2019-08-22 RX ORDER — POLYMYXIN B SULFATE AND TRIMETHOPRIM 1; 10000 MG/ML; [USP'U]/ML
1-2 SOLUTION OPHTHALMIC EVERY 4 HOURS
Qty: 5 ML | Refills: 0 | Status: SHIPPED | OUTPATIENT
Start: 2019-08-22 | End: 2019-11-01

## 2019-08-22 RX ORDER — AZITHROMYCIN 250 MG/1
TABLET, FILM COATED ORAL
Qty: 6 TABLET | Refills: 0 | Status: SHIPPED | OUTPATIENT
Start: 2019-08-22 | End: 2019-11-01

## 2019-08-22 RX ORDER — FAMOTIDINE 40 MG/1
40 TABLET, FILM COATED ORAL DAILY
Qty: 14 TABLET | Refills: 0 | Status: CANCELLED | OUTPATIENT
Start: 2019-08-22 | End: 2019-09-05

## 2019-08-22 ASSESSMENT — MIFFLIN-ST. JEOR: SCORE: 1345.67

## 2019-08-22 ASSESSMENT — PAIN SCALES - GENERAL: PAINLEVEL: EXTREME PAIN (8)

## 2019-08-22 NOTE — PROGRESS NOTES
Subjective     August Ray is a 63 year old male who presents to clinic today for the following health issues:    HPI   Medication Followup of Cough    Taking Medication as prescribed: yes    Side Effects:  None    Medication Helping Symptoms:  Yes cough improving but still persists. Current symptoms matter in eyes for a few days not responding to over the counter artificial tears, cough, sore throat, pressure left ear with decreased hearing   No sinus congestion or PND  Hx no reflux or GERD      Lost prev referral for prostate cancer and ejaculatory issues f/u.        Allergies   Allergen Reactions     Hydrocodone-Acetaminophen      syncope     Insulin Glargine      Itchy rash     No Clinical Screening - See Comments      Intolerance to this     Oxycodone      States he passed out/fell after taking it       Patient Active Problem List   Diagnosis     Erectile dysfunction     PC (prostate cancer) (H)     Decreased libido     HTN (hypertension)     Hyperlipidemia     Orthostatic hypotension     Rotator cuff tear arthropathy of right shoulder     Type 2 diabetes mellitus with other specified complication, without long-term current use of insulin (H)     Gout     Allergic rhinitis     Insomnia     Lower urinary tract symptoms (LUTS)     Cervicalgia     Mild obstruction of pulmonary airflow (H)       Past Medical History:   Diagnosis Date     Diabetes type 2, controlled (H)      HTN (hypertension)      Hyperlipidemia          Current Outpatient Medications on File Prior to Visit:  Acetaminophen (TYLENOL PO) Take  by mouth as needed.   aspirin 81 MG tablet Take 1 tablet by mouth daily.   ASSURE COMFORT LANCETS 30G [facility]    atorvastatin (LIPITOR) 40 MG tablet    atorvastatin (LIPITOR) 80 MG tablet TK SS A T D   benzonatate (TESSALON) 200 MG capsule Take 1 capsule (200 mg) by mouth 3 times daily as needed for cough   blood glucose (NO BRAND SPECIFIED) lancets standard Use to test blood sugar 1-4 times daily or as  directed.   Blood Glucose Calibration (CARESENS CONTROL A) SOLN    blood glucose monitoring (NO BRAND SPECIFIED) meter device kit Use to test blood sugar 1-4 times daily or as directed. Per insurance: One touch   blood glucose monitoring (NO BRAND SPECIFIED) test strip Use to test blood sugars 1-4 times daily or as directed. Per insurance   Blood Glucose Monitoring Suppl (CARESENS N GLUCOSE SYSTEM) MARIAELENA    CARESENS N GLUCOSE TEST test strip    dapagliflozin (FARXIGA) 10 MG TABS tablet Take 1 tablet (10 mg) by mouth daily   glipiZIDE (GLUCOTROL XL) 5 MG 24 hr tablet TK 1 T PO D IN THE MORNING WITH BREAKFAST   indomethacin (INDOCIN) 50 MG capsule    Lancet Devices (ADJUSTABLE LANCING DEVICE) MISC    lisinopril-hydrochlorothiazide (PRINZIDE/ZESTORETIC) 20-12.5 MG tablet Take 1 tablet by mouth daily   lisinopril-hydrochlorothiazide (PRINZIDE/ZESTORETIC) 20-25 MG tablet Take 1 tablet by mouth daily   Loratadine (CLARITIN PO) Take  by mouth as needed.   metFORMIN (GLUCOPHAGE) 1000 MG tablet Take 1 tablet (1,000 mg) by mouth 2 times daily (with meals)   Multiple Vitamins-Minerals (ROGERS MULTI MEN PO) Take 1 tablet by mouth daily.   niacin (NIASPAN) 500 MG CR tablet Take 1 tablet by mouth daily.   oxybutynin ER (DITROPAN XL) 10 MG 24 hr tablet Take 1 tablet (10 mg) by mouth daily   pravastatin (PRAVACHOL) 40 MG tablet Take 1 tablet (40 mg) by mouth daily   predniSONE (DELTASONE) 10 MG tablet 3 tabs PO QD x 2 days then 2 tabs PO QD x 2 days then 1 tab PO QD x 2 days   sildenafil (VIAGRA) 100 MG tablet Take 1 tablet (100 mg) by mouth daily as needed (take 1 hour before intercourse)   tamsulosin (FLOMAX) 0.4 MG capsule Take 1 capsule (0.4 mg) by mouth daily   tiZANidine (ZANAFLEX) 2 MG tablet Take 1 tablet (2 mg) by mouth 3 times daily as needed for muscle spasms   VIAGRA 100 MG tablet Take 1 tablet (100 mg) by mouth daily as needed (sexual activity)   albuterol (PROAIR HFA/PROVENTIL HFA/VENTOLIN HFA) 108 (90 Base) MCG/ACT  "inhaler Inhale 2 puffs into the lungs every 4 hours as needed for shortness of breath / dyspnea or wheezing (Patient not taking: Reported on 2019)   albuterol (PROAIR HFA/PROVENTIL HFA/VENTOLIN HFA) 108 (90 Base) MCG/ACT inhaler Inhale 2 puffs into the lungs every 4 hours as needed for shortness of breath / dyspnea or wheezing (Patient not taking: Reported on 2019)   [] doxycycline monohydrate (ADOXA) 100 MG tablet Take 1 tablet (100 mg) by mouth every 12 hours for 7 days     Current Facility-Administered Medications on File Prior to Visit:  sodium chloride (PF) 0.9% PF flush 20 mL       Social History     Tobacco Use     Smoking status: Former Smoker     Last attempt to quit: 1991     Years since quittin.7     Smokeless tobacco: Never Used   Substance Use Topics     Alcohol use: Yes       No family history on file.    ROS:  Consitutional: As above  ENT: As above  Respiratory: As above    OBJECTIVE:  /84 (BP Location: Left arm, Patient Position: Chair, Cuff Size: Adult Regular)   Pulse 100   Temp 99  F (37.2  C) (Oral)   Resp 18   Ht 1.67 m (5' 5.75\")   Wt 61.7 kg (136 lb)   SpO2 98%   BMI 22.12 kg/m    GENERAL APPEARANCE: healthy, alert and no distress  EYES: conjunctiva clear  HENT:  rt,  TMs w/o erythema, effusion or bulging. Left is bulgin, w/effusion.,   Nose and mouth without ulcers, erythema or lesions.  NO tonsillar enlargement erythema or exudates.   NECK: supple, nontender, no lymphadenopathy  RESP: lungs clear to auscultation - no rales, rhonchi or wheezes  CV: regular rates and rhythm, normal S1 S2, no murmur noted  NEURO: awake, alert          ASSESSMENT: Well appearing. Call to sched Pulm appt 4-6 weeks out, if slowly improving cough (since May) resolves in intermim can cancel.      ICD-10-CM    1. Non-recurrent acute suppurative otitis media of left ear without spontaneous rupture of tympanic membrane H66.002 azithromycin (ZITHROMAX Z-LIDIA) 250 MG tablet   2. " Cough R05 PULMONARY MEDICINE REFERRAL   3. Acute conjunctivitis of both eyes, unspecified acute conjunctivitis type H10.33 trimethoprim-polymyxin b (POLYTRIM) 08617-7.1 UNIT/ML-% ophthalmic solution         PLAN:  Lots of rest and fluids.  RTC if any worsening symptoms or if not improving.    Ja Malhotra PA-C

## 2019-08-22 NOTE — PATIENT INSTRUCTIONS
At [facility], we strive to deliver an exceptional experience to you, every time we see you.  If you receive a survey in the mail, please send us back your thoughts. We really do value your feedback.    Based on your medical history, these are the current health maintenance/preventive care services that you are due for (some may have been done at this visit.)  Health Maintenance Due   Topic Date Due     SPIROMETRY  1955     ADVANCE CARE PLANNING  1955     COPD ACTION PLAN  1955     EYE EXAM  1955     ZOSTER IMMUNIZATION (1 of 2) 11/24/2004         Suggested websites for health information:  Www.[facility] hospital[facility].org : Up to date and easily searchable information on multiple topics.  Www.medlineplus.gov : medication info, interactive tutorials, watch real surgeries online  Www.familydoctor.org : good info from the Academy of Family Physicians  Www.cdc.gov : public health info, travel advisories, epidemics (H1N1)  Www.aap.org : children's health info, normal development, vaccinations  Www.health.[facility].[place].us : [place] dept of health, public health issues in [place], N1N1    Your care team:                            Family Medicine Internal Medicine   MD Reji Barlow MD Shantel Branch-Fleming, MD Katya Georgiev PA-C Nam Ho, MD Pediatrics   JENNIFER Segundo, BERNARDINO Soliz APRMD Shari Roman CNP, MD Deborah Mielke, MD Kim Thein, APRN [facility]      Clinic hours: Monday - Thursday 7 am-7 pm; Fridays 7 am-5 pm.   Urgent care: Monday - Friday 11 am-9 pm; Saturday and Sunday 9 am-5 pm.  Pharmacy : Monday -Thursday 8 am-8 pm; Friday 8 am-6 pm; Saturday and Sunday 9 am-5 pm.     Clinic: [phone]   Pharmacy: [phone]         Return to clinic or Emergency Department for fevers, vision loss, or worsening symptoms.    Conjunctivitis Caused by Infection  Infections are caused by viruses or bacteria. Treatment includes  keeping your eyes and hands clean. Your doctor may prescribe eyedrops, and tell you to stay home from work or school if you re contagious. Untreated infections can be serious, so it s important that a doctor diagnoses you    Viral Infections  A cold, flu, or other virus can spread to the eyes. This causes a watery discharge. The eyes may burn or itch and get red. The eyelids may also be puffy and sore.  Treating the infections. Most viral infections go away on their own. Artificial tears, over the counter antihistamine eye drops, and warm compresses can relieve symptoms. Your doctor may also prescribe eyedrops. A viral infection can be very contagious and spreads quickly. To prevent this, wash your hands often. Use a separate tissue to wipe each eye. Don t touch your eyes or share bedding or towels.  Bacterial Infections  Bacterial infections often occur in one eye. There may be a watery or a thick discharge from the eye. These infections can cause serious damage to the eye if not treated promptly.  Treating the infections. Your doctor may prescribe eyedrops or ointment to kill the bacteria. Warm compresses can help keep the eyelids clean. To keep the bacteria from spreading, wash your hands often. Use a separate tissue to wipe each eye. Don t touch your eyes or share bedding or towels.    4664-5241 [facility], [address] [place] [address], [place] [address]. All rights reserved. This information is not intended as a substitute for professional medical care. Always follow your healthcare professional's instructions.        Patient Education     Otitis Media (Middle-Ear Infection) in Adults  Otitis media is another name for a middle-ear infection. It means an infection behind your eardrum. This kind of ear infection can happen after any condition that keeps fluid from draining from the middle ear. These conditions include allergies, a cold, a sore throat, or a respiratory infection.  Middle-ear infections are common  in children, but they can also happen in adults. An ear infection in an adult may mean a more serious problem than in a child. So you may need additional tests. If you have an ear infection, you should see your health care provider for treatment.  What are the types of middle-ear infections?  Infections can affect the middle ear in several ways. They are:    Acute otitis media. This middle-ear infection occurs suddenly. It causes swelling and redness. Fluid and mucus become trapped inside the ear. You can have a fever and ear pain.    Otitis media with effusion. Fluid (effusion) and mucus build up in the middle ear after the infection goes away. You may feel like your middle ear is full. This can continue for months and may affect your hearing.    Chronic otitis media with effusion. Fluid (effusion) remains in the middle ear for a long time. Or it builds up again and again, even though there is no infection. This type of middle-ear infection may be hard to treat. It may also affect your hearing.  Who is more likely to get a middle-ear infection?  You are more likely to get an ear infection if you:    Smoke or are around someone who smokes    Have seasonal or year-round allergy symptoms    Have a cold or other upper respiratory infection  What causes a middle-ear infection?  The middle ear connects to the throat by a canal called the eustachian tube. This tube helps even out the pressure between the outer ear and the inner ear. A cold or allergy can irritate the tube or cause the area around it to swell. This can keep fluid from draining from the middle ear. The fluid builds up behind the eardrum. Bacteria and viruses can grow in this fluid. The bacteria and viruses cause the middle-ear infection.  What are the symptoms of a middle-ear infection?  Common symptoms of a middle-ear infection in adults are:    Pain in 1 or both ears    Drainage from the ear    Muffled hearing    Sore throat   You may also have a fever.  Rarely, your balance can be affected.  These symptoms may be the same as for other conditions. It s important to talk with your health care provider if you think you have a middle-ear infection. If you have a high fever, severe pain behind your ear, or paralysis in your face, see your provider as soon as you can.  How is a middle-ear infection diagnosed?  Your health care provider will take a medical history and do a physical exam. He or she will look at the outer ear and eardrum with an otoscope. The otoscope is a lighted tool that lets your provider see inside the ear. A pneumatic otoscope blows a puff of air into the ear to check how well your eardrum moves. If you eardrum doesn t move well, it may mean you have fluid behind it.  Your provider may also do a test called tympanometry. This test tells how well the middle ear is working. It can find any changes in pressure in the middle ear. Your provider may test your hearing with a tuning fork.  How is a middle-ear infection treated?  A middle-ear infection may be treated with:    Antibiotics, taken by mouth or as ear drops    Medication for pain    Decongestants, antihistamines, or nasal steroids  Your health care provider may also have you try autoinsufflation. This helps adjust the air pressure in your ear. For this, you pinch your nose and gently exhale. This forces air back through the eustachian tube.  The exact treatment for your ear infection will depend on the type of infection you have. In general, if your symptoms don t get better in 48 to 72 hours, contact your health care provider.  Middle-ear infections can cause long-term problems if not treated. They can lead to:    Infection in other parts of the head    Permanent hearing loss    Paralysis of a nerve in your face  If you have a middle-ear infection that doesn t get better, you may need to see an ear, nose, and throat specialist (otolaryngologist). You may need a CT scan or MRI to check for head and  neck cancer.  Ear tubes  Sometimes fluid stays in the middle ear even after you take antibiotics and the infection goes away. In this case, your health care provider may suggest that a small tube be placed in your ear. The tube is put at the opening of the eardrum. The tube keeps fluid from building up and relieves pressure in the middle ear. It can also help you hear better. This surgery is called myringotomy. It is not often done in adults.  The tubes usually fall out on their own after 6 months to a year.    9514-5042 [employer]. [address] [place] [address]. All rights reserved. This information is not intended as a substitute for professional medical care. Always follow your healthcare professional's instructions.

## 2019-08-27 DIAGNOSIS — R05.9 COUGH: ICD-10-CM

## 2019-08-27 DIAGNOSIS — E11.69 TYPE 2 DIABETES MELLITUS WITH OTHER SPECIFIED COMPLICATION, WITHOUT LONG-TERM CURRENT USE OF INSULIN (H): ICD-10-CM

## 2019-08-27 NOTE — TELEPHONE ENCOUNTER
JARDIANCE 10 MG TABS tablet (Discontinued)      Last Written Prescription Date:  05/07/18  Last Fill Quantity: 30,   # refills: 2  Last Office Visit: 08/22/19Jolynn  Future Office visit:       Routing refill request to provider for review/approval because:  Drug not on the [facility], [facility] or [facility] refill protocol or controlled substance

## 2019-08-27 NOTE — TELEPHONE ENCOUNTER
Requested Prescriptions   Pending Prescriptions Disp Refills     benzonatate (TESSALON) 200 MG capsule [Pharmacy Med Name: BENZONATATE 200MG CAPSULES]        Last Written Prescription Date:  08/15/19  Last Fill Quantity: 20,   # refills: 0  Last Office Visit: 08/22/19Jolynn  Future Office visit:       Routing refill request to provider for review/approval because:  Drug not on the [facility], [facility] or [facility] refill protocol or controlled substance 20 capsule 0     Sig: TAKE 1 CAPSULE(200 MG) BY MOUTH THREE TIMES DAILY AS NEEDED FOR COUGH       There is no refill protocol information for this order

## 2019-08-27 NOTE — TELEPHONE ENCOUNTER
Routing refill request to provider for review/approval because:  Drug not active on patient's medication list-looks like an alternative therapy was given instead.      Parveen Chu RN, BSN, PHN

## 2019-08-28 RX ORDER — BENZONATATE 200 MG/1
CAPSULE ORAL
Qty: 30 CAPSULE | Refills: 1 | Status: SHIPPED | OUTPATIENT
Start: 2019-08-28

## 2019-09-24 ENCOUNTER — PRE VISIT (OUTPATIENT)
Dept: UROLOGY | Facility: CLINIC | Age: 64
End: 2019-09-24

## 2019-09-24 NOTE — TELEPHONE ENCOUNTER
Reason for Visit: Follow up med refill    Diagnosis: ED    Orders/Procedures/Records: in system    Contact Patient: n/a    Rooming Requirements: normal      Mimi Echols LPN  09/24/19  11:13 AM

## 2019-10-03 ENCOUNTER — APPOINTMENT (OUTPATIENT)
Dept: LAB | Facility: CLINIC | Age: 64
End: 2019-10-03
Payer: COMMERCIAL

## 2019-10-03 ENCOUNTER — OFFICE VISIT (OUTPATIENT)
Dept: UROLOGY | Facility: CLINIC | Age: 64
End: 2019-10-03
Payer: COMMERCIAL

## 2019-10-03 VITALS
SYSTOLIC BLOOD PRESSURE: 149 MMHG | HEIGHT: 66 IN | HEART RATE: 89 BPM | WEIGHT: 136 LBS | BODY MASS INDEX: 21.86 KG/M2 | DIASTOLIC BLOOD PRESSURE: 90 MMHG

## 2019-10-03 DIAGNOSIS — Z92.3 HISTORY OF BRACHYTHERAPY: ICD-10-CM

## 2019-10-03 DIAGNOSIS — C61 PROSTATE CANCER (H): Primary | ICD-10-CM
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
## 2019-10-03 PROBLEM — R55 SYNCOPE AND COLLAPSE: Status: ACTIVE | Noted: 2017-05-22

## 2019-10-03 LAB — PSA SERPL-MCNC: 0.02 UG/L (ref 0–4)

## 2019-10-03 RX ORDER — SILDENAFIL 100 MG/1
100 TABLET, FILM COATED ORAL DAILY PRN
Qty: 100 TABLET | Refills: 0 | Status: SHIPPED | OUTPATIENT
Start: 2019-10-03 | End: 2019-11-01

## 2019-10-03 ASSESSMENT — PAIN SCALES - GENERAL: PAINLEVEL: EXTREME PAIN (8)

## 2019-10-03 ASSESSMENT — MIFFLIN-ST. JEOR: SCORE: 1345.67

## 2019-10-03 NOTE — LETTER
October 4, 2019       TO: August Ray  [address]  [place] [address]       Dear ,    We are writing to inform you of your test results.    Please can you notify him his PSA is low/ stable.   No evidence of prostate cancer after brachytherapy    Resulted Orders   PSA, tumor marker   Result Value Ref Range    PSA 0.02 0 - 4 ug/L      Comment:      Assay Method:  Chemiluminescence using Siemens Vista analyzer       Mao Molina MD

## 2019-10-03 NOTE — NURSING NOTE
"Chief Complaint   Patient presents with     Follow Up     ED, med refill       Blood pressure (!) 149/90, pulse 89, height 1.67 m (5' 5.75\"), weight 61.7 kg (136 lb). Body mass index is 22.12 kg/m .    Patient Active Problem List   Diagnosis     Erectile dysfunction     PC (prostate cancer) (H)     Decreased libido     HTN (hypertension)     Hyperlipidemia     Orthostatic hypotension     Rotator cuff tear arthropathy of right shoulder     Type 2 diabetes mellitus with other specified complication, without long-term current use of insulin (H)     Gout     Allergic rhinitis     Insomnia     Lower urinary tract symptoms (LUTS)     Cervicalgia     Mild obstruction of pulmonary airflow (H)     Malignant neoplasm of prostate (H)     Cervical paraspinal muscle spasm     Work related injury     Syncope and collapse     History of brachytherapy       Allergies   Allergen Reactions     Hydrocodone-Acetaminophen      syncope     Insulin Glargine      Itchy rash     No Clinical Screening - See Comments      Intolerance to this     Oxycodone      States he passed out/fell after taking it       Current Outpatient Medications   Medication Sig Dispense Refill     Acetaminophen (TYLENOL PO) Take  by mouth as needed.       albuterol (PROAIR HFA/PROVENTIL HFA/VENTOLIN HFA) 108 (90 Base) MCG/ACT inhaler Inhale 2 puffs into the lungs every 4 hours as needed for shortness of breath / dyspnea or wheezing (Patient not taking: Reported on 8/22/2019) 1 Inhaler 0     albuterol (PROAIR HFA/PROVENTIL HFA/VENTOLIN HFA) 108 (90 Base) MCG/ACT inhaler Inhale 2 puffs into the lungs every 4 hours as needed for shortness of breath / dyspnea or wheezing (Patient not taking: Reported on 8/22/2019) 1 Inhaler 0     aspirin 81 MG tablet Take 1 tablet by mouth daily.       ASSURE COMFORT LANCETS 30G MISC        atorvastatin (LIPITOR) 40 MG tablet   0     atorvastatin (LIPITOR) 80 MG tablet TK SS A T D  0     azithromycin (ZITHROMAX Z-LIDIA) 250 MG tablet 2 " tabs day one then 1 tab qd 6 tablet 0     benzonatate (TESSALON) 200 MG capsule TAKE 1 CAPSULE(200 MG) BY MOUTH THREE TIMES DAILY AS NEEDED FOR COUGH 30 capsule 1     blood glucose (NO BRAND SPECIFIED) lancets standard Use to test blood sugar 1-4 times daily or as directed. 100 each 3     Blood Glucose Calibration (CARESENS CONTROL A) SOLN        blood glucose monitoring (NO BRAND SPECIFIED) meter device kit Use to test blood sugar 1-4 times daily or as directed. Per insurance: One touch 1 kit 0     blood glucose monitoring (NO BRAND SPECIFIED) test strip Use to test blood sugars 1-4 times daily or as directed. Per insurance 100 strip 3     Blood Glucose Monitoring Suppl (CARESENS N GLUCOSE SYSTEM) MARIAELENA        CARESENS N GLUCOSE TEST test strip        dapagliflozin (FARXIGA) 10 MG TABS tablet Take 1 tablet (10 mg) by mouth daily 30 tablet 5     glipiZIDE (GLUCOTROL XL) 5 MG 24 hr tablet TK 1 T PO D IN THE MORNING WITH BREAKFAST 90 tablet 1     indomethacin (INDOCIN) 50 MG capsule   3     Lancet Devices (ADJUSTABLE LANCING DEVICE) MISC        lisinopril-hydrochlorothiazide (PRINZIDE/ZESTORETIC) 20-12.5 MG tablet Take 1 tablet by mouth daily 90 tablet 1     lisinopril-hydrochlorothiazide (PRINZIDE/ZESTORETIC) 20-25 MG tablet Take 1 tablet by mouth daily 90 tablet 1     Loratadine (CLARITIN PO) Take  by mouth as needed.       metFORMIN (GLUCOPHAGE) 1000 MG tablet Take 1 tablet (1,000 mg) by mouth 2 times daily (with meals) 60 tablet 3     Multiple Vitamins-Minerals (ROGERS MULTI MEN PO) Take 1 tablet by mouth daily.       niacin (NIASPAN) 500 MG CR tablet Take 1 tablet by mouth daily.       oxybutynin ER (DITROPAN XL) 10 MG 24 hr tablet Take 1 tablet (10 mg) by mouth daily 90 tablet 0     pravastatin (PRAVACHOL) 40 MG tablet Take 1 tablet (40 mg) by mouth daily 90 tablet 1     predniSONE (DELTASONE) 10 MG tablet 3 tabs PO QD x 2 days then 2 tabs PO QD x 2 days then 1 tab PO QD x 2 days 12 tablet 0     sildenafil (VIAGRA)  100 MG tablet Take 1 tablet (100 mg) by mouth daily as needed (take 1 hour before intercourse) 100 tablet 0     tamsulosin (FLOMAX) 0.4 MG capsule Take 1 capsule (0.4 mg) by mouth daily 90 capsule 1     tiZANidine (ZANAFLEX) 2 MG tablet Take 1 tablet (2 mg) by mouth 3 times daily as needed for muscle spasms 30 tablet 0     VIAGRA 100 MG tablet Take 1 tablet (100 mg) by mouth daily as needed (sexual activity) 6 tablet 11       Social History     Tobacco Use     Smoking status: Former Smoker     Last attempt to quit: 1991     Years since quittin.8     Smokeless tobacco: Never Used   Substance Use Topics     Alcohol use: Yes     Drug use: Never       Mimi Echols LPN  10/3/2019  8:46 AM

## 2019-10-03 NOTE — PROGRESS NOTES
"Clinic Follow-up  10/03/2019      Mr. Ray is a 63 year old male with urologic history of prostate cancer [place] 6= 3+3 s/p  brachytherapy in 2007 who presents today for follow up for PSA. Patient has previously been on Androgel in 2013 which has since been discontinued. His PSA trend is below. Viagra prescribed at his last visit with urology was 11/2019.    During his last visit we discussed that Viagra was too expensive so was referred to a different pharmacy. He was able to fill this last year and had 100 pills. He has not finished this yet and has been taking it PRN. He is able to get erections with this and it has been working well.     He still complains of lack of ejaculation and low libido. Testosterone was normal 10/2016:   378ng/dL. Libido is still low. Overall, he is happy with erections.       Component      Latest Ref Rng & Units 5/12/2008 9/29/2008 2/16/2009 7/6/2009   PSA      0 - 4 ug/L 0.88 0.68 0.71 0.99     Component      Latest Ref Rng & Units 2/15/2010 2/11/2011 9/6/2011 7/16/2012   PSA      0 - 4 ug/L 0.25 0.16 0.15 0.07     Component      Latest Ref Rng & Units 2/4/2013 4/26/2013 8/23/2013 3/6/2014   PSA      0 - 4 ug/L 0.10 0.09 <0.07 . . . <0.07 . . .     Component      Latest Ref Rng & Units 3/9/2015 8/14/2015 9/21/2015 8/30/2016   PSA      0 - 4 ug/L 0.05 0.06 0.04 0.02     Component      Latest Ref Rng & Units 10/17/2017 10/10/2018   PSA      0 - 4 ug/L 0.03 0.02       BP (!) 149/90   Pulse 89   Ht 1.67 m (5' 5.75\")   Wt 61.7 kg (136 lb)   BMI 22.12 kg/m      General: Alert, oriented, nad  Eyes: anicteric, EOMI.  Pulse: regular  Resps: normal, non-labored.  Abdomen:  nondistended.   exam deferred.     Current medications:    A-  History of prostate cancer, PSA low and stable.  Decreased libido with normal testosterone.  Anejaculation    Plan-  - Continue yearly PSA checks - will recheck today  - Refill for Viagra provided  - try stopping Flomax due to retrograde ejaculation.  " Also OK to try Sudafed.  - rad onc referral- his last doctor (Himanshu) moved out of our system.    Seen and evaluated by staff Dr. Jesse Garza MD  Urology Resident     I saw and examined the patient with the resident today.  I agree with the resident note and plan of care as above.     Mao Molina MD  Urology Staff     15min visit, over 50% face to face in counseling/discussion of above issues.

## 2019-10-03 NOTE — PATIENT INSTRUCTIONS
Lab work today.  Follow up with Dr. Molina in one year.        It was a pleasure meeting with you today.  Thank you for allowing me and my team the privilege of caring for you today.  YOU are the reason we are here, and I truly hope we provided you with the excellent service you deserve.  Please let us know if there is anything else we can do for you so that we can be sure you are leaving completely satisfied with your care experience.

## 2019-10-03 NOTE — LETTER
"10/3/2019       RE: August Ray  [address]  [place] [address]     Dear Colleague,    Thank you for referring your patient, August Ray, to the [facility] UROLOGY AND INST FOR PROSTATE AND UROLOGIC CANCERS at [facility]. Please see a copy of my visit note below.    Clinic Follow-up  10/03/2019      Mr. Ray is a 63 year old male with urologic history of prostate cancer [place] 6= 3+3 s/p  brachytherapy in 2007 who presents today for follow up for PSA. Patient has previously been on Androgel in 2013 which has since been discontinued. His PSA trend is below. Viagra prescribed at his last visit with urology was 11/2019.    During his last visit we discussed that Viagra was too expensive so was referred to a different pharmacy. He was able to fill this last year and had 100 pills. He has not finished this yet and has been taking it PRN. He is able to get erections with this and it has been working well.     He still complains of lack of ejaculation and low libido. Testosterone was normal 10/2016:   378ng/dL. Libido is still low. Overall, he is happy with erections.       Component      Latest Ref Rng & Units 5/12/2008 9/29/2008 2/16/2009 7/6/2009   PSA      0 - 4 ug/L 0.88 0.68 0.71 0.99     Component      Latest Ref Rng & Units 2/15/2010 2/11/2011 9/6/2011 7/16/2012   PSA      0 - 4 ug/L 0.25 0.16 0.15 0.07     Component      Latest Ref Rng & Units 2/4/2013 4/26/2013 8/23/2013 3/6/2014   PSA      0 - 4 ug/L 0.10 0.09 <0.07 . . . <0.07 . . .     Component      Latest Ref Rng & Units 3/9/2015 8/14/2015 9/21/2015 8/30/2016   PSA      0 - 4 ug/L 0.05 0.06 0.04 0.02     Component      Latest Ref Rng & Units 10/17/2017 10/10/2018   PSA      0 - 4 ug/L 0.03 0.02       BP (!) 149/90   Pulse 89   Ht 1.67 m (5' 5.75\")   Wt 61.7 kg (136 lb)   BMI 22.12 kg/m       General: Alert, oriented, nad  Eyes: anicteric, EOMI.  Pulse: regular  Resps: normal, " non-labored.  Abdomen:  nondistended.   exam deferred.     Current medications:    A-  History of prostate cancer, PSA low and stable.  Decreased libido with normal testosterone.  Anejaculation    Plan-  - Continue yearly PSA checks - will recheck today  - Refill for Viagra provided  - try stopping Flomax due to retrograde ejaculation.  Also OK to try Sudafed.  - rad onc referral- his last doctor (Himanshu) moved out of our system.    Seen and evaluated by staff Dr. Jesse Garza MD  Urology Resident     I saw and examined the patient with the resident today.  I agree with the resident note and plan of care as above.     Mao Molina MD  Urology Staff     15min visit, over 50% face to face in counseling/discussion of above issues.       Again, thank you for allowing me to participate in the care of your patient.      Sincerely,    Mao Molina MD

## 2019-10-07 DIAGNOSIS — E11.69 TYPE 2 DIABETES MELLITUS WITH OTHER SPECIFIED COMPLICATION, WITHOUT LONG-TERM CURRENT USE OF INSULIN (H): ICD-10-CM

## 2019-10-07 NOTE — TELEPHONE ENCOUNTER
"Requested Prescriptions   Pending Prescriptions Disp Refills     blood glucose (NO BRAND SPECIFIED) test strip  Last Written Prescription Date:  05/11/18  Last Fill Quantity: 100,  # refills: 3   Last Office Visit with RONALD, THU or [facility] prescribing provider:  08/22/19Jolynn   Future Office Visit:    100 strip 3     Sig: Use to test blood sugars 1-4 times daily or as directed. Per insurance       Diabetic Supplies Protocol Passed - 10/7/2019  8:40 AM        Passed - Medication is active on med list        Passed - Patient is 18 years of age or older        Passed - Recent (6 mo) or future (30 days) visit within the authorizing provider's specialty     Patient had office visit in the last 6 months or has a visit in the next 30 days with authorizing provider.  See \"Patient Info\" tab in inbasket, or \"Choose Columns\" in Meds & Orders section of the refill encounter.              "

## 2019-10-09 ENCOUNTER — TELEPHONE (OUTPATIENT)
Dept: UROLOGY | Facility: CLINIC | Age: 64
End: 2019-10-09

## 2019-10-09 NOTE — TELEPHONE ENCOUNTER
Prescription approved per [facility] Refill Protocol.      Parveen Chu RN, BSN, PHN

## 2019-10-09 NOTE — TELEPHONE ENCOUNTER
Central Prior Authorization Team   [phone]    PA Initiation    Medication: sildenafil 100 mg  Insurance Company: [employer] - Phone [phone] Fax [phone]  Pharmacy Filling the Rx: [id number] - [place], [place] - [address] [address]  Filling Pharmacy Phone: [phone]  Filling Pharmacy Fax: [phone]  Start Date: 10/9/2019

## 2019-10-09 NOTE — TELEPHONE ENCOUNTER
Prior Authorization Retail Medication Request    Medication/Dose: sildenafil 100 mg  ICD code (if different than what is on RX):  Erectile dysfunction:N52.9Previously Tried and Failed:  none  Rationale:  To help obtain an erection       Insurance Name:  [facility] [employer]  Insurance ID:  [id number]    Secondary Insurance Name:[employer] HMO  Secondary Insurance Name: [id number]      Pharmacy Information (if different than what is on RX)  Name:  [facility]  Phone:  [phone]

## 2019-10-14 NOTE — TELEPHONE ENCOUNTER
Prior Authorization Approval    **NOTE: PA Approval is up to 6 tablets per month**    Authorization Effective Date: 10/11/2019  Authorization Expiration Date: 10/11/2020  Medication: sildenafil 100 mg-[place] APPROVED   Approved Dose/Quantity:   Reference #: CASE # [id number]   Insurance Company: [employer] - Phone [phone] Fax [phone]  Expected CoPay:       CoPay Card Available:      Foundation Assistance Needed:    Which Pharmacy is filling the prescription (Not needed for infusion/clinic administered): [id number] - [place], [place] - [address] [address]  Pharmacy Notified: Yes - Pharmacy stated that medication is a refill too soon. Next fill date is 10/16/2019. **Instructed pharmacy to notify patient when script is ready to /ship.**   Patient Notified: Yes

## 2019-10-24 NOTE — PROGRESS NOTES
RADIATION ONCOLOGY FOLLOW UP NOTE      DATE OF FOLLOW UP: November 1, 2019    PATIENT NAME: August Ray    DISEASE TREATED: Adenocarcinoma of the prostate, clinical stage K5uB2H6, [place] grade 3 + 3 = 6, and  pre-therapy PSA of 5.6             INTERVAL SINCE COMPLETION OF RADIOTHERAPY: 12 years  (completed on 10/9/2007).     TYPE OF RADIOTHERAPY GIVEN:   Brachytherapy I-125, 144 Gy, prostate volume 34 cc, 30 needles and 111 seeds(36.8 mCi average activity 0.332mCi)    SUBJECTIVE: This is a 63-year-old man with a history of prostate cancer dating back to 2007 when he underwent brachii therapy for localized prostate cancer.  The treatment was insertion of iodine 125 seeds on October 9, 2007.  He presents for routine follow-up.  He complains of low libido but he was satisfied with erection. Otherwise, he was well    AUA sx score was 4/35  with a DEMAR score of 17/25    OBJECTIVE: /86   Pulse 112   Resp 18    Wt Readings from Last 2 Encounters:   10/03/19 61.7 kg (136 lb)   08/22/19 61.7 kg (136 lb)   Abdomen soft and non tender  Lungs clear without palpable LNs in the neck      PSA:  Date Value   10/03/2019 0.02   10/10/2018 0.02   10/17/2017 0.03   08/30/2016 0.02   09/21/2015 0.04   08/14/2015 0.06   03/09/2015 0.05   04/26/2013 0.09   7/15/2012 0.07   9/6/2011 0.15   2/15/2009 0.25   7/6/2009 0.99   9/29/2008 0.68   5/12/2008 0.88       CURRENT ZUBROD PERFORMANCE STATUS: 0       IMPRESSION OF DISEASE STATUS: Clinical No Evidence of Disease(LAKSHMI)    RECOMMENDATION: Follow up in a year with a PSA. The patient will see our nurse practitioner for a routine visit,      SYLVIA Cisse M.D.  Department of Radiation Oncology  [facility]  [phone]

## 2019-10-28 ENCOUNTER — TELEPHONE (OUTPATIENT)
Dept: UROLOGY | Facility: CLINIC | Age: 64
End: 2019-10-28

## 2019-10-28 NOTE — TELEPHONE ENCOUNTER
[facility] Call Center    Phone Message    May a detailed message be left on voicemail: yes    Reason for Call: Other: Patient called need to speak to someone about some type of request for medication.He said he need to discuss with Dr. Molina to get further into the situation.     Action Taken: Other: p Urology

## 2019-11-01 ENCOUNTER — OFFICE VISIT (OUTPATIENT)
Dept: RADIATION ONCOLOGY | Facility: CLINIC | Age: 64
End: 2019-11-01
Attending: RADIOLOGY
Payer: COMMERCIAL

## 2019-11-01 ENCOUNTER — OFFICE VISIT (OUTPATIENT)
Dept: FAMILY MEDICINE | Facility: CLINIC | Age: 64
End: 2019-11-01
Payer: COMMERCIAL

## 2019-11-01 VITALS
TEMPERATURE: 98.9 F | RESPIRATION RATE: 18 BRPM | BODY MASS INDEX: 20.69 KG/M2 | HEART RATE: 107 BPM | OXYGEN SATURATION: 98 % | HEIGHT: 68 IN | SYSTOLIC BLOOD PRESSURE: 146 MMHG | WEIGHT: 136.5 LBS | DIASTOLIC BLOOD PRESSURE: 85 MMHG

## 2019-11-01 VITALS
DIASTOLIC BLOOD PRESSURE: 86 MMHG | RESPIRATION RATE: 18 BRPM | BODY MASS INDEX: 22.01 KG/M2 | SYSTOLIC BLOOD PRESSURE: 136 MMHG | HEART RATE: 112 BPM | WEIGHT: 135.36 LBS

## 2019-11-01 DIAGNOSIS — J06.9 VIRAL URI WITH COUGH: Primary | ICD-10-CM

## 2019-11-01 DIAGNOSIS — C61 MALIGNANT NEOPLASM OF PROSTATE (H): Primary | ICD-10-CM

## 2019-11-01 DIAGNOSIS — I10 ESSENTIAL HYPERTENSION: ICD-10-CM

## 2019-11-01 DIAGNOSIS — J98.01 BRONCHOSPASM: ICD-10-CM

## 2019-11-01 LAB
DEPRECATED S PYO AG THROAT QL EIA: NORMAL
FLUAV+FLUBV AG SPEC QL: NEGATIVE
FLUAV+FLUBV AG SPEC QL: NEGATIVE
SPECIMEN SOURCE: NORMAL
SPECIMEN SOURCE: NORMAL

## 2019-11-01 PROCEDURE — 87081 CULTURE SCREEN ONLY: CPT | Performed by: NURSE PRACTITIONER

## 2019-11-01 PROCEDURE — 94640 AIRWAY INHALATION TREATMENT: CPT | Performed by: NURSE PRACTITIONER

## 2019-11-01 PROCEDURE — 99203 OFFICE O/P NEW LOW 30 MIN: CPT | Mod: 25 | Performed by: NURSE PRACTITIONER

## 2019-11-01 PROCEDURE — 87804 INFLUENZA ASSAY W/OPTIC: CPT | Performed by: NURSE PRACTITIONER

## 2019-11-01 PROCEDURE — G0463 HOSPITAL OUTPT CLINIC VISIT: HCPCS | Performed by: RADIOLOGY

## 2019-11-01 PROCEDURE — 87880 STREP A ASSAY W/OPTIC: CPT | Performed by: NURSE PRACTITIONER

## 2019-11-01 RX ORDER — ALBUTEROL SULFATE 90 UG/1
1-2 AEROSOL, METERED RESPIRATORY (INHALATION) EVERY 6 HOURS PRN
Qty: 8.5 G | Refills: 0 | Status: SHIPPED | OUTPATIENT
Start: 2019-11-01

## 2019-11-01 RX ORDER — IPRATROPIUM BROMIDE AND ALBUTEROL SULFATE 2.5; .5 MG/3ML; MG/3ML
3 SOLUTION RESPIRATORY (INHALATION) ONCE
Status: COMPLETED | OUTPATIENT
Start: 2019-11-01 | End: 2019-11-01

## 2019-11-01 RX ADMIN — IPRATROPIUM BROMIDE AND ALBUTEROL SULFATE 3 ML: 2.5; .5 SOLUTION RESPIRATORY (INHALATION) at 01:00

## 2019-11-01 ASSESSMENT — ENCOUNTER SYMPTOMS
NEUROLOGICAL NEGATIVE: 1
GASTROINTESTINAL NEGATIVE: 1
CARDIOVASCULAR NEGATIVE: 1
PSYCHIATRIC NEGATIVE: 1
MUSCULOSKELETAL NEGATIVE: 1
EYES NEGATIVE: 1
COUGH: 1

## 2019-11-01 ASSESSMENT — PAIN SCALES - GENERAL
PAINLEVEL: NO PAIN (0)
PAINLEVEL: NO PAIN (0)

## 2019-11-01 ASSESSMENT — MIFFLIN-ST. JEOR: SCORE: 1375.72

## 2019-11-01 NOTE — NURSING NOTE
Clinic Administered Medication Documentation    MEDICATION LIST: Inhalable/Nebs Medication Documentation    Patient was given Ipratropium-Albuterol Neb. Prior to medication administration, verified patients identity using patient s name and date of birth. Please see MAR and medication order for additional information.     Chris Matson CMA

## 2019-11-01 NOTE — PATIENT INSTRUCTIONS
Negative for strep and influenza  Albuterol inhaler as needed for cough/wheeze  Push fluids, rest  You can use a humidifier by your bed at night. Distilled water should be used with the humidifier.  Tylenol or ibuprofen as needed for pain or fever  Follow up if you are worsening or not improving in 5 days    Patient Education     * Viral Upper Respiratory Illness with Wheezing (Child)    Your child has a cold. The medical term is Upper Respiratory Illness (URI). A cold is caused by a virus. It s contagious during the first few days. It spreads easily from person to person by coughing, sneezing or direct contact (touching your sick child, then touching your own eyes, nose or mouth). Washing your hands often lowers the risk of spread to others.  Most viral illnesses go away within 7 to 14 days with rest and simple home remedies. But they can last up to 4 weeks.  Antibiotics will not kill a virus and should not be prescribed for a cold. If your child s air passages are irritated, they may go into spasm. This can cause wheezing, even in children who don t have asthma. The doctor may prescribe medicine to prevent wheezing.  Home care    FLUIDS: Fever makes the body lose more water.  ? For infants under 1 year old, continue regular feedings (formula or breast). Between feedings offer Pedialyte, Infalyte, Rehydralyte or another oral rehydration drink. You can get these from grocery and drug stores without a prescription. DON T give honey to a child younger than 1 year old.  ? For children over 1 year old, give plenty of liquids. Children may prefer cool drinks, frozen desserts or ice pops. They may also like warm soup or drinks with lemon and honey.    HYGIENE: Wash your hands well with soap and warm water before and after caring for your child. This helps prevent spreading the infection.    FEEDING: If your child doesn t want to eat solid foods, it s OK for a few days, as long as they drink lots of fluid.    ACTIVITY:  Keep children with fever at home, resting or playing quietly. Encourage lots of naps. Keep your child home from  or school for the first 3 days of the illness. Your child may return to  or school when the fever is gone, and they are eating well and feeling better.    SLEEP: Give your child plenty of time to rest. Sleeplessness and fussing are common. A congested child will sleep best with the head and upper body propped up on pillows. You can also try raising the head of the bed frame on a 6-inch block. An infant may sleep in a car seat placed on the bed. Don t use pillows for babies under 1 year old.    COUGH:Coughing is a normal part of this illness.  ? A cool mist humidifier at the bedside may help. Be sure to clean and dry the humidifier every day to prevent bacteria and mold.  ? Over-the-counter cough and cold medicine doesn t help young children and can cause serious side effects. They are especially bad for babies under 2 years of age.  ? Don t give over-the-counter cough and cold medicines to children under 6 years unless your doctor has told you to do so.  ? Don t expose your child to cigarette smoke. It can make the cough worse.    STUFFY NOSE (NASAL CONGESTION): Suction the nose of infants with a rubber bulb syringe. Talk with your child s doctor if you don t know how to use a bulb syringe. It may help to put 2 to 3 drops of saltwater (saline) nose drops in each nostril before suctioning. You can get saline nose drops without a prescription. You can also make saline by adding 1/4 teaspoon table salt to 1 cup of water.    MEDICINE: Use Tylenol (acetaminophen) for fever, fussiness or discomfort, unless the doctor prescribed another medicine. In infants over 6 months of age, you may use Children s Motrin (ibuprofen) instead of Tylenol. Never give aspirin to anyone under 18 years of age who has a fever. It may cause severe liver damage.  Follow-up care  Follow up as directed by your child s  doctor.  Note: If your child had an X-ray, a doctor will review it. We ll let you know if we find anything that may affect your child's care.  When to call the doctor  For a usually healthy child, call your child s doctor right away if any of these occur:  1. Your child is 3 months old or younger and has a fever of 100.4 F (38 C) or higher. Get medical care right away. Fever in a young baby can be a sign of a dangerous infection.  2. Your child is younger than 2 years of age and has a fever of 100.4 F (38 C) for more than 1 day.  3. Your child is 2 years old or older and has a fever of 100.4 F (38 C) for more than 3 days.  4. Your child is any age and has repeated fevers above 104 F (40 C).  5. Symptoms don t get better, or get worse.  6. Breathing doesn t get better.  7. Your child loses their appetite or feeds poorly.  8. A new rash appears.  9. Your child has any of these problems:  ? Earache  ? Pain around the nose or eyes (sinus pain)  ? Stiff or painful neck  ? Headache  ? Repeated loose, watery poop (diarrhea)  ? Throwing up (vomiting)  Call 911  Call 911 if any of these occur:    Breathing gets worse    Fast breathing:  ? Birth to 6 weeks: over 60 breaths per minute  ? 6 weeks to 2 years: over 45 breaths per minute  ? 3 to 6 years: over 35 breaths per minute  ? 7 to 10 years: over 30 breaths per minute  ? Older than 10 years: over 25 breaths per minute    Blue tint to the lips or fingernails    Signs of dehydration, such as dry mouth, crying with no tears or peeing less than normal (For babies, this means no wet diapers for 8 hours.)    Unusual fussiness, drowsiness, or confusion    3066-3225 [employer]. [address] [place] [address], [place] [address]. All rights reserved. This information is not intended as a substitute for professional medical care. Always follow your healthcare professional's instructions.  This information has been modified by your health care provider with permission from the  publisher.  Modifications clinically reviewed by Torsten Chambers DO, MBA, FACOEP, Director of Physician Informatics for Emergency Medicine, [facility] on 8/20/18.

## 2019-11-01 NOTE — LETTER
November 1, 2019      August Ray  [address]   [facility] [address]        To Whom It May Concern:    August Ray  was seen on 11/1/2019.  Please excuse him today and tomorrow due to illness.      Sincerely,        JERICA Miner CNP

## 2019-11-01 NOTE — PROGRESS NOTES
HPI    INITIAL PATIENT ASSESSMENT    Diagnosis: Prostate Cancer Follow-up    Prior radiation therapy: See pt records: Brachy seed therapy 2007    Prior chemotherapy: None    Prior hormonal therapy:No    Pain Eval:  Denies    Psychosocial  Living arrangements: Lives at home with wife.  Fall Risk: independent   referral needs: Not needed    Advanced Directive: No  Implantable Cardiac Device? No    Nurse face-to-face time: Level 5:  over 15 min face to face time  Review of Systems   HENT: Negative.    Eyes: Negative.    Respiratory: Positive for cough.    Cardiovascular: Negative.    Gastrointestinal: Negative.    Musculoskeletal: Negative.    Skin: Negative.    Neurological: Negative.    Endo/Heme/Allergies: Negative.    Psychiatric/Behavioral: Negative.

## 2019-11-01 NOTE — LETTER
11/1/2019       RE: August Ray  [address]  [place] [address]     Dear Colleague,    Thank you for referring your patient, August Ray, to the RADIATION ONCOLOGY CLINIC. Please see a copy of my visit note below.        RADIATION ONCOLOGY FOLLOW UP NOTE      DATE OF FOLLOW UP: November 1, 2019    PATIENT NAME: August Ray    DISEASE TREATED: Adenocarcinoma of the prostate, clinical stage N6uL2G5, [place] grade 3 + 3 = 6, and  pre-therapy PSA of 5.6             INTERVAL SINCE COMPLETION OF RADIOTHERAPY: 12 years  (completed on 10/9/2007).     TYPE OF RADIOTHERAPY GIVEN:   Brachytherapy I-125, 144 Gy, prostate volume 34 cc, 30 needles and 111 seeds(36.8 mCi average activity 0.332mCi)    SUBJECTIVE: This is a 63-year-old man with a history of prostate cancer dating back to 2007 when he underwent brachii therapy for localized prostate cancer.  The treatment was insertion of iodine 125 seeds on October 9, 2007.  He presents for routine follow-up.  He complains of low libido but he was satisfied with erection. Otherwise, he was well    AUA sx score was 4/35  with a DEMAR score of 17/25    OBJECTIVE: /86   Pulse 112   Resp 18    Wt Readings from Last 2 Encounters:   10/03/19 61.7 kg (136 lb)   08/22/19 61.7 kg (136 lb)   Abdomen soft and non tender  Lungs clear without palpable LNs in the neck      PSA:  Date Value   10/03/2019 0.02   10/10/2018 0.02   10/17/2017 0.03   08/30/2016 0.02   09/21/2015 0.04   08/14/2015 0.06   03/09/2015 0.05   04/26/2013 0.09   7/15/2012 0.07   9/6/2011 0.15   2/15/2009 0.25   7/6/2009 0.99   9/29/2008 0.68   5/12/2008 0.88       CURRENT ZUBROD PERFORMANCE STATUS: 0       IMPRESSION OF DISEASE STATUS: Clinical No Evidence of Disease(LAKSHMI)    RECOMMENDATION: Follow up in a year with a PSA. The patient will see our nurse practitioner for a routine visit,      SYLVIA Cisse M.D.  Department of Radiation Oncology  [facility]  [place]  [phone]            [facility]    INITIAL PATIENT ASSESSMENT    Diagnosis: Prostate Cancer Follow-up    Prior radiation therapy: See pt records: Brachy seed therapy 2007    Prior chemotherapy: None    Prior hormonal therapy:No    Pain Eval:  Denies    Psychosocial  Living arrangements: Lives at home with wife.  Fall Risk: independent   referral needs: Not needed    Advanced Directive: No  Implantable Cardiac Device? No    Nurse face-to-face time: Level 5:  over 15 min face to face time  Review of Systems   HENT: Negative.    Eyes: Negative.    Respiratory: Positive for cough.    Cardiovascular: Negative.    Gastrointestinal: Negative.    Musculoskeletal: Negative.    Skin: Negative.    Neurological: Negative.    Endo/Heme/Allergies: Negative.    Psychiatric/Behavioral: Negative.          Again, thank you for allowing me to participate in the care of your patient.      Sincerely,    Eugenio Cisse MD

## 2019-11-01 NOTE — PROGRESS NOTES
Subjective     August Ray is a 63 year old male who presents to clinic today for the following health issues:    HPI   Acute Illness   Acute illness concerns: cough  Onset: 10/30/2019    Fever: no     Chills/Sweats: YES- Chills    Headache (location?): YES- Frontal lobe    Sinus Pressure:YES    Conjunctivitis:  no    Ear Pain: no    Rhinorrhea: YES    Congestion: YES    Sore Throat: no      Cough: YES-productive of clear sputum    Wheeze: YES    Decreased Appetite: no     Nausea: no     Vomiting: no     Diarrhea:  no     Dysuria/Freq.: no     Fatigue/Achiness: YES    Sick/Strep Exposure: no      Therapies Tried and outcome: Nyquil ,ibuprofen ,mucenix     Pleasant 63 year old male presents with concerns for cough, sneeze and running nose that started 2 days ago. No chest pain or shortness of breath. Some wheezing.    Patient Active Problem List   Diagnosis     Erectile dysfunction     PC (prostate cancer) (H)     Decreased libido     HTN (hypertension)     Hyperlipidemia     Orthostatic hypotension     Rotator cuff tear arthropathy of right shoulder     Type 2 diabetes mellitus with other specified complication, without long-term current use of insulin (H)     Gout     Allergic rhinitis     Insomnia     Lower urinary tract symptoms (LUTS)     Cervicalgia     Mild obstruction of pulmonary airflow (H)     Malignant neoplasm of prostate (H)     Cervical paraspinal muscle spasm     Work related injury     Syncope and collapse     History of brachytherapy     History reviewed. No pertinent surgical history.    Social History     Tobacco Use     Smoking status: Former Smoker     Packs/day: 0.00     Last attempt to quit: 1991     Years since quittin.9     Smokeless tobacco: Never Used   Substance Use Topics     Alcohol use: Yes     Family History   Family history unknown: Yes         Current Outpatient Medications   Medication Sig Dispense Refill     Acetaminophen (TYLENOL PO) Take  by mouth as needed.        albuterol (PROAIR HFA/PROVENTIL HFA/VENTOLIN HFA) 108 (90 Base) MCG/ACT inhaler Inhale 1-2 puffs into the lungs every 6 hours as needed for shortness of breath / dyspnea or wheezing 8.5 g 0     aspirin 81 MG tablet Take 1 tablet by mouth daily.       ASSURE COMFORT LANCETS 30G MISC        benzonatate (TESSALON) 200 MG capsule TAKE 1 CAPSULE(200 MG) BY MOUTH THREE TIMES DAILY AS NEEDED FOR COUGH 30 capsule 1     blood glucose (NO BRAND SPECIFIED) lancets standard Use to test blood sugar 1-4 times daily or as directed. 100 each 3     blood glucose (NO BRAND SPECIFIED) test strip Use to test blood sugars 1-4 times daily or as directed. Per insurance 100 strip 3     Blood Glucose Calibration (CARESENS CONTROL A) SOLN        blood glucose monitoring (NO BRAND SPECIFIED) meter device kit Use to test blood sugar 1-4 times daily or as directed. Per insurance: One touch 1 kit 0     Blood Glucose Monitoring Suppl (Iggli N GLUCOSE SYSTEM) MARIAELENA        dapagliflozin (FARXIGA) 10 MG TABS tablet Take 1 tablet (10 mg) by mouth daily 30 tablet 5     glipiZIDE (GLUCOTROL XL) 5 MG 24 hr tablet TK 1 T PO D IN THE MORNING WITH BREAKFAST 90 tablet 1     indomethacin (INDOCIN) 50 MG capsule   3     lisinopril-hydrochlorothiazide (PRINZIDE/ZESTORETIC) 20-12.5 MG tablet Take 1 tablet by mouth daily 90 tablet 1     Loratadine (CLARITIN PO) Take  by mouth as needed.       metFORMIN (GLUCOPHAGE) 1000 MG tablet Take 1 tablet (1,000 mg) by mouth 2 times daily (with meals) 60 tablet 3     Multiple Vitamins-Minerals (ROGERS MULTI MEN PO) Take 1 tablet by mouth daily.       niacin ER (NIASPAN) 500 MG CR tablet Take 1 tablet by mouth daily        pravastatin (PRAVACHOL) 40 MG tablet Take 1 tablet (40 mg) by mouth daily 90 tablet 1     predniSONE (DELTASONE) 10 MG tablet 3 tabs PO QD x 2 days then 2 tabs PO QD x 2 days then 1 tab PO QD x 2 days 12 tablet 0     sildenafil (VIAGRA) 100 MG tablet Take 1 tablet (100 mg) by mouth daily as needed  "(take 1 hour before intercourse) 100 tablet 0     tamsulosin (FLOMAX) 0.4 MG capsule Take 1 capsule (0.4 mg) by mouth daily 90 capsule 1     tiZANidine (ZANAFLEX) 2 MG tablet Take 1 tablet (2 mg) by mouth 3 times daily as needed for muscle spasms 30 tablet 0     Allergies   Allergen Reactions     Hydrocodone-Acetaminophen      syncope     Insulin Glargine      Itchy rash     No Clinical Screening - See Comments      Intolerance to this     Oxycodone      States he passed out/fell after taking it     BP Readings from Last 3 Encounters:   11/01/19 (!) 146/85   11/01/19 136/86   10/03/19 (!) 149/90    Wt Readings from Last 3 Encounters:   11/01/19 61.9 kg (136 lb 8 oz)   11/01/19 61.4 kg (135 lb 5.8 oz)   10/03/19 61.7 kg (136 lb)                    Reviewed and updated as needed this visit by Provider  Tobacco  Allergies  Meds  Problems  Med Hx  Surg Hx  Fam Hx         Review of Systems   ROS COMP: Constitutional, HEENT, cardiovascular, pulmonary, gi and gu systems are negative, except as otherwise noted.      Objective    BP (!) 146/85   Pulse 107   Temp 98.9  F (37.2  C) (Oral)   Resp 18   Ht 1.715 m (5' 7.5\")   Wt 61.9 kg (136 lb 8 oz)   SpO2 98%   BMI 21.06 kg/m    Body mass index is 21.06 kg/m .  Physical Exam   GENERAL: healthy, alert and no distress  EYES: Eyes grossly normal to inspection, PERRL and conjunctivae and sclerae normal  HENT: ear canals and TM's normal, nose and mouth without ulcers or lesions  NECK: no adenopathy, no asymmetry, masses, or scars and thyroid normal to palpation  RESP: pre-neb: expiratory wheezing  Post-neb: lungs clear to auscultation - no rales, rhonchi or wheezes  CV: regular rate and rhythm, normal S1 S2, no S3 or S4, no murmur, click or rub, no peripheral edema and peripheral pulses strong  MS: no gross musculoskeletal defects noted, no edema  PSYCH: mentation appears normal, affect normal/bright    Diagnostic Test Results:  Results for orders placed or performed " in visit on 11/01/19 (from the past 24 hour(s))   Strep, Rapid Screen   Result Value Ref Range    Specimen Description Throat     Rapid Strep A Screen       NEGATIVE: No Group A streptococcal antigen detected by immunoassay, await culture report.   Influenza A/B antigen   Result Value Ref Range    Influenza A/B Agn Specimen Nasopharyngeal     Influenza A Negative NEG^Negative    Influenza B Negative NEG^Negative           Assessment & Plan     1. Viral URI with cough  Neb in clinic with excellent results. Patient reported significant improvement in wheezing breathing. Negative strep and flu. Lungs clear after neb. Discussed supportive interventions. RTC if worsening or not improving. Note given for work for today and tomorrow.  - Strep, Rapid Screen  - Influenza A/B antigen  - ipratropium - albuterol 0.5 mg/2.5 mg/3 mL (DUONEB) neb solution 3 mL  - Beta strep group A culture    2. Bronchospasm  As above  - albuterol (PROAIR HFA/PROVENTIL HFA/VENTOLIN HFA) 108 (90 Base) MCG/ACT inhaler; Inhale 1-2 puffs into the lungs every 6 hours as needed for shortness of breath / dyspnea or wheezing  Dispense: 8.5 g; Refill: 0    3. Essential hypertension  Slightly elevated. Likely related to OTC cold medications. Otherwise asymptomatic.         See Patient Instructions    Negative for strep and influenza  Albuterol inhaler as needed for cough/wheeze  Push fluids, rest  You can use a humidifier by your bed at night. Distilled water should be used with the humidifier.  Tylenol or ibuprofen as needed for pain or fever  Follow up if you are worsening or not improving in 5 days    Return in about 5 days (around 11/6/2019), or if symptoms worsen or fail to improve.    The benefits, risks and potential side effects were discussed in detail. Black box warnings discussed as relevant. All patient questions were answered. The patient was instructed to follow up immediately if any adverse reactions develop.    Return precautions discussed,  including when to seek urgent/emergent care.    Patient verbalizes understanding and agrees with plan of care. Patient stable for discharge.      JERICA Miner CNP  [facility] [address]

## 2019-11-02 LAB
BACTERIA SPEC CULT: NORMAL
SPECIMEN SOURCE: NORMAL

## 2019-11-04 DIAGNOSIS — E11.69 TYPE 2 DIABETES MELLITUS WITH OTHER SPECIFIED COMPLICATION, WITHOUT LONG-TERM CURRENT USE OF INSULIN (H): Primary | ICD-10-CM

## 2019-11-04 NOTE — TELEPHONE ENCOUNTER
"Requested Prescriptions   Pending Prescriptions Disp Refills     glipiZIDE (GLUCOTROL XL) 5 MG 24 hr tablet  Last Written Prescription Date:  01/07/19  Last Fill Quantity: 90,  # refills: 1   Last Office Visit with [facility], [facility] or [facility] prescribing provider:  11/01/19-[facility]   Future Office Visit:    90 tablet 1     Sig: TK 1 T PO D IN THE MORNING WITH BREAKFAST       Sulfonylurea Agents Failed - 11/4/2019  8:52 AM        Failed - Blood pressure less than 140/90 in past 6 months     BP Readings from Last 3 Encounters:   11/01/19 (!) 146/85   11/01/19 136/86   10/03/19 (!) 149/90                 Failed - Patient has documented LDL within the past 12 mos.     Recent Labs   Lab Test 10/10/18  1239   LDL 93             Failed - Patient has documented A1c within the specified period of time.     If HgbA1C is 8 or greater, it needs to be on file within the past 3 months.  If less than 8, must be on file within the past 6 months.     Recent Labs   Lab Test 04/22/19  0900   A1C 6.9*             Passed - Patient has had a Microalbumin in the past 15 mos.     Recent Labs   Lab Test 10/10/18  1239   MICROL 32   UMALCR 36.95*             Passed - Medication is active on med list        Passed - Patient is age 18 or older        Passed - Patient has a recent creatinine (normal) within the past 12 mos.     Recent Labs   Lab Test 08/09/19  0806   CR 0.96             Passed - Recent (6 mo) or future (30 days) visit within the authorizing provider's specialty     Patient had office visit in the last 6 months or has a visit in the next 30 days with authorizing provider or within the authorizing provider's specialty.  See \"Patient Info\" tab in inbasket, or \"Choose Columns\" in Meds & Orders section of the refill encounter.              "

## 2019-11-05 RX ORDER — GLIPIZIDE 5 MG/1
TABLET, FILM COATED, EXTENDED RELEASE ORAL
Qty: 90 TABLET | Refills: 0 | Status: SHIPPED | OUTPATIENT
Start: 2019-11-05 | End: 2020-02-07

## 2019-11-05 NOTE — TELEPHONE ENCOUNTER
Routing refill request to provider for review/approval because:  A break in medication and due to not having the following:   Patient has documented LDL within the past 12 mos.    Patient has documented A1c within the specified period of time.     Shirlene Graham RN

## 2019-11-14 ENCOUNTER — TELEPHONE (OUTPATIENT)
Dept: UROLOGY | Facility: CLINIC | Age: 64
End: 2019-11-14

## 2019-11-14 NOTE — TELEPHONE ENCOUNTER
[facility] Call Center    Phone Message    May a detailed message be left on voicemail: yes    Reason for Call: Other: Patient said Dr. Molina sent him to a nerve specialist, he said the doctor did nothing for him but shook his hand and said he will see him next year. The patient said he recvd a 25.00 bill for nothing, please call to discuss.     Action Taken: Other: [facility] Urology

## 2019-11-18 DIAGNOSIS — C61 PROSTATE CANCER (H): ICD-10-CM

## 2019-11-18 NOTE — TELEPHONE ENCOUNTER
Patient called and given number to set up his oncology appointment April Flaherty LPN Staff Nurse

## 2019-11-18 NOTE — LETTER
[address]  [address]  [phone]    November 20, 2019      August Ray  [address]   [facility] [address]      Dear August,    We have refilled your Flomax.   We will need to see you for an office visit and fasting labs before any additional refills can be given.  Please call [phone] to schedule this appointment.      Thank you,    [facility]

## 2019-11-18 NOTE — TELEPHONE ENCOUNTER
"Requested Prescriptions   Pending Prescriptions Disp Refills     tamsulosin (FLOMAX) 0.4 MG capsule  Last Written Prescription Date:  01/07/19  Last Fill Quantity: 90,  # refills: 1   Last Office Visit with RONALD, THU or [facility] prescribing provider:  11/01/19DharmeshFrederick   Future Office Visit:    90 capsule 1     Sig: Take 1 capsule (0.4 mg) by mouth daily       Alpha Blockers Failed - 11/18/2019  2:17 PM        Failed - Blood pressure under 140/90 in past 12 months     BP Readings from Last 3 Encounters:   11/01/19 (!) 146/85   11/01/19 136/86   10/03/19 (!) 149/90                 Failed - Patient does not have Tadalafil, Vardenafil, or Sildenafil on their medication list        Passed - Recent (12 mo) or future (30 days) visit within the authorizing provider's specialty     Patient has had an office visit with the authorizing provider or a provider within the authorizing providers department within the previous 12 mos or has a future within next 30 days. See \"Patient Info\" tab in inbasket, or \"Choose Columns\" in Meds & Orders section of the refill encounter.              Passed - Medication is active on med list        Passed - Patient is 18 years of age or older          "

## 2019-11-19 RX ORDER — TAMSULOSIN HYDROCHLORIDE 0.4 MG/1
0.4 CAPSULE ORAL DAILY
Qty: 90 CAPSULE | Refills: 0 | Status: SHIPPED | OUTPATIENT
Start: 2019-11-19 | End: 2020-02-27

## 2019-11-19 NOTE — TELEPHONE ENCOUNTER
Medication is being filled for 1 time refill only due to:  Patient needs to be seen because it has been more than one year since last visit.   Please call to schedule annual exam/Dm recheck.  Mariposa Salmeron RN

## 2019-12-02 DIAGNOSIS — I10 ESSENTIAL HYPERTENSION: ICD-10-CM

## 2019-12-02 NOTE — TELEPHONE ENCOUNTER
"Requested Prescriptions   Pending Prescriptions Disp Refills     lisinopril-hydrochlorothiazide (PRINZIDE/ZESTORETIC) 20-12.5 MG tablet  Last Written Prescription Date:  05/09/19  Last Fill Quantity: 90,  # refills: 1   Last Office Visit with G, P or [facility] prescribing provider:  11/01/19-Mattaponi   Future Office Visit:    90 tablet 1     Sig: Take 1 tablet by mouth daily       Diuretics (Including Combos) Protocol Failed - 12/2/2019  2:17 PM        Failed - Blood pressure under 140/90 in past 12 months     BP Readings from Last 3 Encounters:   11/01/19 (!) 146/85   11/01/19 136/86   10/03/19 (!) 149/90                 Passed - Recent (12 mo) or future (30 days) visit within the authorizing provider's specialty     Patient has had an office visit with the authorizing provider or a provider within the authorizing providers department within the previous 12 mos or has a future within next 30 days. See \"Patient Info\" tab in inbasket, or \"Choose Columns\" in Meds & Orders section of the refill encounter.              Passed - Medication is active on med list        Passed - Patient is age 18 or older        Passed - Normal serum creatinine on file in past 12 months     Recent Labs   Lab Test 08/09/19  0806   CR 0.96              Passed - Normal serum potassium on file in past 12 months     Recent Labs   Lab Test 08/09/19  0806   POTASSIUM 3.5                    Passed - Normal serum sodium on file in past 12 months     Recent Labs   Lab Test 08/09/19  0806                   "

## 2019-12-04 RX ORDER — LISINOPRIL AND HYDROCHLOROTHIAZIDE 12.5; 2 MG/1; MG/1
1 TABLET ORAL DAILY
Qty: 90 TABLET | Refills: 1 | Status: SHIPPED | OUTPATIENT
Start: 2019-12-04

## 2019-12-04 NOTE — TELEPHONE ENCOUNTER
Routing refill request to provider for review/approval because:  2/3 Bp reading have been out of ranges and failed the FM RN Refill protocol.    Radha Montes RN

## 2020-01-20 ENCOUNTER — TELEPHONE (OUTPATIENT)
Dept: FAMILY MEDICINE | Facility: CLINIC | Age: 65
End: 2020-01-20

## 2020-01-20 DIAGNOSIS — E78.5 HYPERLIPIDEMIA, UNSPECIFIED HYPERLIPIDEMIA TYPE: ICD-10-CM

## 2020-01-20 NOTE — LETTER
[address]  [address]  [phone]    January 23, 2020      August Ray  [address]   [facility] [address]      Dear August,    We have refilled your Pravastatin.   We will need to see you for an office visit and fasting labs before any additional refills can be given.  Please call [phone] to schedule this appointment.      Thank you,    [facility]

## 2020-01-20 NOTE — TELEPHONE ENCOUNTER
"Requested Prescriptions   Pending Prescriptions Disp Refills     pravastatin (PRAVACHOL) 40 MG tablet  Last Written Prescription Date:  01/07/19  Last Fill Quantity: 90,  # refills: 1   Last Office Visit with G, THU or [facility] prescribing provider: 11/01/19-Robson  Future Office Visit:    90 tablet 1     Sig: Take 1 tablet (40 mg) by mouth daily       Statins Protocol Failed - 1/20/2020  2:14 PM        Failed - LDL on file in past 12 months     Recent Labs   Lab Test 10/10/18  1239   LDL 93             Passed - No abnormal creatine kinase in past 12 months     No lab results found.             Passed - Recent (12 mo) or future (30 days) visit within the authorizing provider's specialty     Patient has had an office visit with the authorizing provider or a provider within the authorizing providers department within the previous 12 mos or has a future within next 30 days. See \"Patient Info\" tab in inbasket, or \"Choose Columns\" in Meds & Orders section of the refill encounter.              Passed - Medication is active on med list        Passed - Patient is age 18 or older          "

## 2020-01-22 RX ORDER — PRAVASTATIN SODIUM 40 MG
40 TABLET ORAL DAILY
Qty: 90 TABLET | Refills: 0 | Status: SHIPPED | OUTPATIENT
Start: 2020-01-22 | End: 2020-04-28

## 2020-01-22 NOTE — TELEPHONE ENCOUNTER
Routing refill request to provider for review/approval because:  Labs not current:  Lipids last drawn on 10/10/2018    Radha Frederick RN  [place]/[facility]

## 2020-01-26 DIAGNOSIS — E11.69 TYPE 2 DIABETES MELLITUS WITH OTHER SPECIFIED COMPLICATION, WITHOUT LONG-TERM CURRENT USE OF INSULIN (H): ICD-10-CM

## 2020-01-26 NOTE — TELEPHONE ENCOUNTER
"Requested Prescriptions   Pending Prescriptions Disp Refills     dapagliflozin (FARXIGA) 10 MG TABS tablet  Last Written Prescription Date:  11/26/18  Last Fill Quantity: 30,  # refills: 5   Last Office Visit with G, [facility] or [facility] prescribing provider:  11/1/19   Future Office Visit:      30 tablet 5     Sig: Take 1 tablet (10 mg) by mouth daily       Sodium Glucose Co-Transport Inhibitor Agents Failed - 1/26/2020 11:07 AM        Failed - Blood pressure less than 140/90 in past 6 months     BP Readings from Last 3 Encounters:   11/01/19 (!) 146/85   11/01/19 136/86   10/03/19 (!) 149/90                 Failed - Patient has documented LDL within the past 12 mos.     Recent Labs   Lab Test 10/10/18  1239   LDL 93             Failed - Patient has had a Microalbumin in the past 15 mos.     Recent Labs   Lab Test 10/10/18  1239   MICROL 32   UMALCR 36.95*             Failed - Patient has documented A1c within the specified period of time.     If HgbA1C is 8 or greater, it needs to be on file within the past 3 months.  If less than 8, must be on file within the past 6 months.     Recent Labs   Lab Test 04/22/19  0900   A1C 6.9*             Passed - No creatinine >1.4 or GFR <45 within the past 12 mos     Recent Labs   Lab Test 08/09/19  0806   GFRESTIMATED 83   GFRESTBLACK >90       Recent Labs   Lab Test 08/09/19  0806   CR 0.96             Passed - Medication is active on med list        Passed - Patient is age 18 or older        Passed - Patient has documented normal Potassium within the last 12 mos.     Recent Labs   Lab Test 08/09/19  0806   POTASSIUM 3.5             Passed - Recent (6 mo) or future (30 days) visit within the authorizing provider's specialty     Patient had office visit in the last 6 months or has a visit in the next 30 days with authorizing provider or within the authorizing provider's specialty.  See \"Patient Info\" tab in inbasket, or \"Choose Columns\" in Meds & Orders section of the refill " encounter.

## 2020-01-30 RX ORDER — DAPAGLIFLOZIN 10 MG/1
10 TABLET, FILM COATED ORAL DAILY
Qty: 30 TABLET | Refills: 0 | Status: SHIPPED | OUTPATIENT
Start: 2020-01-30 | End: 2020-03-06

## 2020-01-30 NOTE — TELEPHONE ENCOUNTER
Routing refill request to provider for review/approval because:    Labs not current:  Microalbumin, lipids, A1c last drawn on 10/10/2018 and 04/22/2019  BP readings out of range    Radha Frederick RN  [place]/[facility]

## 2020-02-04 DIAGNOSIS — E11.69 TYPE 2 DIABETES MELLITUS WITH OTHER SPECIFIED COMPLICATION, WITHOUT LONG-TERM CURRENT USE OF INSULIN (H): ICD-10-CM

## 2020-02-04 NOTE — TELEPHONE ENCOUNTER
"Requested Prescriptions   Pending Prescriptions Disp Refills     glipiZIDE (GLUCOTROL XL) 5 MG 24 hr tablet 90 tablet 0     Sig: TK 1 T PO D IN THE MORNING WITH BREAKFAST           Last Written Prescription Date:  11/5/19  Last Fill Quantity: 90,  # refills: 0   Last Office Visit with [facility], [facility] or [facility] prescribing provider:  11/1/19   Future Office Visit:         Sulfonylurea Agents Failed - 2/4/2020  2:37 PM        Failed - Blood pressure less than 140/90 in past 6 months     BP Readings from Last 3 Encounters:   11/01/19 (!) 146/85   11/01/19 136/86   10/03/19 (!) 149/90                 Failed - Patient has documented LDL within the past 12 mos.     Recent Labs   Lab Test 10/10/18  1239   LDL 93             Failed - Patient has had a Microalbumin in the past 15 mos.     Recent Labs   Lab Test 10/10/18  1239   MICROL 32   UMALCR 36.95*             Failed - Patient has documented A1c within the specified period of time.     If HgbA1C is 8 or greater, it needs to be on file within the past 3 months.  If less than 8, must be on file within the past 6 months.     Recent Labs   Lab Test 04/22/19  0900   A1C 6.9*             Passed - Medication is active on med list        Passed - Patient is age 18 or older        Passed - Patient has a recent creatinine (normal) within the past 12 mos.     Recent Labs   Lab Test 08/09/19  0806   CR 0.96             Passed - Recent (6 mo) or future (30 days) visit within the authorizing provider's specialty     Patient had office visit in the last 6 months or has a visit in the next 30 days with authorizing provider or within the authorizing provider's specialty.  See \"Patient Info\" tab in inbasket, or \"Choose Columns\" in Meds & Orders section of the refill encounter.                  Mekhi Faarax  [facility]  "

## 2020-02-07 RX ORDER — GLIPIZIDE 5 MG/1
TABLET, FILM COATED, EXTENDED RELEASE ORAL
Qty: 30 TABLET | Refills: 0 | Status: SHIPPED | OUTPATIENT
Start: 2020-02-07 | End: 2020-03-16

## 2020-02-07 NOTE — TELEPHONE ENCOUNTER
Routing refill request to provider for review/approval because:  Labs not current:  LDL, microalbumin, HgbA1c  BP above goal    Brunilda Robb RN  [facility]

## 2020-02-25 DIAGNOSIS — C61 PROSTATE CANCER (H): ICD-10-CM

## 2020-02-25 NOTE — TELEPHONE ENCOUNTER
"Requested Prescriptions   Pending Prescriptions Disp Refills     tamsulosin (FLOMAX) 0.4 MG capsule  Last Written Prescription Date:  11/19/19  Last Fill Quantity: 90,  # refills: 0   Last Office Visit with RONALD, THU or [facility] prescribing provider:  11/01/19-Hill   Future Office Visit:    90 capsule 0     Sig: Take 1 capsule (0.4 mg) by mouth daily       Alpha Blockers Failed - 2/25/2020  8:49 AM        Failed - Blood pressure under 140/90 in past 12 months     BP Readings from Last 3 Encounters:   11/01/19 (!) 146/85   11/01/19 136/86   10/03/19 (!) 149/90                 Failed - Patient does not have Tadalafil, Vardenafil, or Sildenafil on their medication list        Passed - Recent (12 mo) or future (30 days) visit within the authorizing provider's specialty     Patient has had an office visit with the authorizing provider or a provider within the authorizing providers department within the previous 12 mos or has a future within next 30 days. See \"Patient Info\" tab in inbasket, or \"Choose Columns\" in Meds & Orders section of the refill encounter.              Passed - Medication is active on med list        Passed - Patient is 18 years of age or older          "

## 2020-02-26 NOTE — TELEPHONE ENCOUNTER
Routing refill request to provider for review/approval because:  Does not pass [facility] RN refill protocol because BP reading are too elevated    Radha Frederick RN  [place]/[facility]

## 2020-02-27 RX ORDER — TAMSULOSIN HYDROCHLORIDE 0.4 MG/1
0.4 CAPSULE ORAL DAILY
Qty: 90 CAPSULE | Refills: 0 | Status: SHIPPED | OUTPATIENT
Start: 2020-02-27 | End: 2020-05-28

## 2020-03-04 DIAGNOSIS — E11.69 TYPE 2 DIABETES MELLITUS WITH OTHER SPECIFIED COMPLICATION, WITHOUT LONG-TERM CURRENT USE OF INSULIN (H): ICD-10-CM

## 2020-03-04 NOTE — TELEPHONE ENCOUNTER
"Requested Prescriptions   Pending Prescriptions Disp Refills     dapagliflozin (FARXIGA) 10 MG TABS tablet  Last Written Prescription Date:  01/30/2020  Last Fill Quantity: 30,  # refills: 0   Last Office Visit with RONALD, THU or [facility] prescribing provider:  11/01/19-Hill   Future Office Visit:    30 tablet 0     Sig: Take 1 tablet (10 mg) by mouth daily Please schedule an appt for further refills.       Sodium Glucose Co-Transport Inhibitor Agents Failed - 3/4/2020  8:12 AM        Failed - Blood pressure less than 140/90 in past 6 months     BP Readings from Last 3 Encounters:   11/01/19 (!) 146/85   11/01/19 136/86   10/03/19 (!) 149/90                 Failed - Patient has documented LDL within the past 12 mos.     Recent Labs   Lab Test 10/10/18  1239   LDL 93             Failed - Patient has had a Microalbumin in the past 15 mos.     Recent Labs   Lab Test 10/10/18  1239   MICROL 32   UMALCR 36.95*             Failed - Patient has documented A1c within the specified period of time.     If HgbA1C is 8 or greater, it needs to be on file within the past 3 months.  If less than 8, must be on file within the past 6 months.     Recent Labs   Lab Test 04/22/19  0900   A1C 6.9*             Passed - No creatinine >1.4 or GFR <45 within the past 12 mos     Recent Labs   Lab Test 08/09/19  0806   GFRESTIMATED 83   GFRESTBLACK >90       Recent Labs   Lab Test 08/09/19  0806   CR 0.96             Passed - Medication is active on med list        Passed - Patient is age 18 or older        Passed - Patient has documented normal Potassium within the last 12 mos.     Recent Labs   Lab Test 08/09/19  0806   POTASSIUM 3.5             Passed - Recent (6 mo) or future (30 days) visit within the authorizing provider's specialty     Patient had office visit in the last 6 months or has a visit in the next 30 days with authorizing provider or within the authorizing provider's specialty.  See \"Patient Info\" tab in inbasket, or \"Choose " "Columns\" in Meds & Orders section of the refill encounter.              "

## 2020-03-04 NOTE — LETTER
[address]  [address]  [phone]    March 10, 2020      August Ray  [address]   [facility] [address]      Dear August,    We recently received a call from your pharmacy requesting a refill of your medication.    A review of your chart indicates that an appointment is required with your provider.  Please call the clinic at [phone] to schedule your appointment.    We have authorized one refill for two weeks worth of your medication to allow time for you to schedule.   If you have a history of diabetes or high cholesterol, please come in fasting for the appointment. Fasting entails nothing to eat or drink 8 hours prior to your appointment; with the exception on water. You may take your medication the day of the appointment.    Sincerely,   [facility] Care Team

## 2020-03-06 ENCOUNTER — TELEPHONE (OUTPATIENT)
Dept: UROLOGY | Facility: CLINIC | Age: 65
End: 2020-03-06

## 2020-03-06 RX ORDER — DAPAGLIFLOZIN 10 MG/1
10 TABLET, FILM COATED ORAL DAILY
Qty: 14 TABLET | Refills: 0 | Status: SHIPPED | OUTPATIENT
Start: 2020-03-06 | End: 2020-06-01

## 2020-03-06 NOTE — TELEPHONE ENCOUNTER
[facility] Call Center    Phone Message    May a detailed message be left on voicemail: yes     Reason for Call: Other: Adelfo calling to request a call back from Diane or Dr. Molina. Adelfo says he has tried [employer] phone number to leave a voicemail message, but it hasn't worked. Adelfo would like to discuss some treatment with them. Please give Adelfo a call back at your earliest convenience.     Action Taken: Message routed to:  [facility]  [facility]    Travel Screening: Not Applicable

## 2020-03-06 NOTE — TELEPHONE ENCOUNTER
Routing refill request to provider for review/approval because:  Sodium Glucose Co-Transport Inhibitor Agents Failed3/4 8:12 AM   Blood pressure less than 140/90 in past 6 months    Patient has documented LDL within the past 12 mos.    Patient has had a Microalbumin in the past 15 mos.    Patient has documented A1c within the specified period of time.     Graciela Wilson RN, [facility] Triage

## 2020-03-10 NOTE — TELEPHONE ENCOUNTER
This writer attempted to contact pt on 03/10/20      Reason for call schedule OV for further refill and left message.      If patient calls back:   Schedule Office Visit appointment within 2 weeks with PCP, document that pt called and close encounter         Chika Clark

## 2020-03-10 NOTE — TELEPHONE ENCOUNTER
Patient is having issues with desire and mood for sex   He would like to discuss with the doctor   Patient should come into the clinic to discuss sooner than September  Diane Tinsley RN   [facility]

## 2020-03-13 DIAGNOSIS — E11.69 TYPE 2 DIABETES MELLITUS WITH OTHER SPECIFIED COMPLICATION, WITHOUT LONG-TERM CURRENT USE OF INSULIN (H): ICD-10-CM

## 2020-03-13 NOTE — TELEPHONE ENCOUNTER
"Requested Prescriptions   Pending Prescriptions Disp Refills     glipiZIDE (GLUCOTROL XL) 5 MG 24 hr tablet  Last Written Prescription Date:  02/07/2020  Last Fill Quantity: 30,  # refills: 0   Last Office Visit with RONALD, THU or [facility] prescribing provider:  11/01/19-Hill   Future Office Visit:    30 tablet 0     Sig: TK 1 T PO D IN THE MORNING WITH BREAKFAST. Please schedule an appt for further refills.       Sulfonylurea Agents Failed - 3/13/2020 10:20 AM        Failed - Blood pressure less than 140/90 in past 6 months     BP Readings from Last 3 Encounters:   11/01/19 (!) 146/85   11/01/19 136/86   10/03/19 (!) 149/90                 Failed - Patient has documented LDL within the past 12 mos.     Recent Labs   Lab Test 10/10/18  1239   LDL 93             Failed - Patient has had a Microalbumin in the past 15 mos.     Recent Labs   Lab Test 10/10/18  1239   MICROL 32   UMALCR 36.95*             Failed - Patient has documented A1c within the specified period of time.     If HgbA1C is 8 or greater, it needs to be on file within the past 3 months.  If less than 8, must be on file within the past 6 months.     Recent Labs   Lab Test 04/22/19  0900   A1C 6.9*             Passed - Medication is active on med list        Passed - Patient is age 18 or older        Passed - Patient has a recent creatinine (normal) within the past 12 mos.     Recent Labs   Lab Test 08/09/19  0806   CR 0.96       Ok to refill medication if creatinine is low          Passed - Recent (6 mo) or future (30 days) visit within the authorizing provider's specialty     Patient had office visit in the last 6 months or has a visit in the next 30 days with authorizing provider or within the authorizing provider's specialty.  See \"Patient Info\" tab in inbasket, or \"Choose Columns\" in Meds & Orders section of the refill encounter.                 "

## 2020-03-16 RX ORDER — GLIPIZIDE 5 MG/1
TABLET, FILM COATED, EXTENDED RELEASE ORAL
Qty: 30 TABLET | Refills: 0 | Status: SHIPPED | OUTPATIENT
Start: 2020-03-16 | End: 2020-04-28

## 2020-03-17 ENCOUNTER — PRE VISIT (OUTPATIENT)
Dept: UROLOGY | Facility: CLINIC | Age: 65
End: 2020-03-17

## 2020-03-17 NOTE — TELEPHONE ENCOUNTER
Reason for Visit: Low libido discussion of symptoms    Diagnosis: low libido    Orders/Procedures/Records: in system    Contact Patient: n/a    Rooming Requirements: normal      Mimi Echols LPN  03/17/20  1:01 PM

## 2020-03-18 ENCOUNTER — TELEPHONE (OUTPATIENT)
Dept: UROLOGY | Facility: CLINIC | Age: 65
End: 2020-03-18

## 2020-03-18 NOTE — TELEPHONE ENCOUNTER
Left generic VM informing patient to call the clinic back in regards to an appointment he has next Thursday 3/26/2020. Informed the patient that the call back number is [phone].    The patient is to have their appointment switched to a telephone visit.    Puja Matthews, EMT

## 2020-03-26 ENCOUNTER — TELEPHONE (OUTPATIENT)
Dept: UROLOGY | Facility: CLINIC | Age: 65
End: 2020-03-26

## 2020-03-26 NOTE — TELEPHONE ENCOUNTER
Spoke with patient to check them in for their appointment. Patient stated that they recently lost their insurance and their new insurance won't come into effect until 4/1/2020. Patient wants to cancel their appointment and call the clinic to reschedule when their insurance renews.       Puja Matthews, EMT

## 2020-04-21 DIAGNOSIS — E78.5 HYPERLIPIDEMIA, UNSPECIFIED HYPERLIPIDEMIA TYPE: ICD-10-CM

## 2020-04-24 NOTE — TELEPHONE ENCOUNTER
"Routing refill request to provider for review/approval because:  Labs not current:  ELLE Major RN  [facility]            Requested Prescriptions   Pending Prescriptions Disp Refills     pravastatin (PRAVACHOL) 40 MG tablet 90 tablet 0     Sig: Take 1 tablet (40 mg) by mouth daily       Statins Protocol Failed - 4/21/2020  1:52 PM        Failed - LDL on file in past 12 months     Recent Labs   Lab Test 10/10/18  1239   LDL 93             Passed - No abnormal creatine kinase in past 12 months     No lab results found.             Passed - Recent (12 mo) or future (30 days) visit within the authorizing provider's specialty     Patient has had an office visit with the authorizing provider or a provider within the authorizing providers department within the previous 12 mos or has a future within next 30 days. See \"Patient Info\" tab in inbasket, or \"Choose Columns\" in Meds & Orders section of the refill encounter.              Passed - Medication is active on med list        Passed - Patient is age 18 or older             "

## 2020-04-27 ENCOUNTER — PATIENT OUTREACH (OUTPATIENT)
Dept: UROLOGY | Facility: CLINIC | Age: 65
End: 2020-04-27

## 2020-04-27 DIAGNOSIS — E11.69 TYPE 2 DIABETES MELLITUS WITH OTHER SPECIFIED COMPLICATION, WITHOUT LONG-TERM CURRENT USE OF INSULIN (H): ICD-10-CM

## 2020-04-27 RX ORDER — PRAVASTATIN SODIUM 40 MG
40 TABLET ORAL DAILY
Qty: 90 TABLET | Refills: 0 | Status: CANCELLED | OUTPATIENT
Start: 2020-04-27

## 2020-04-27 NOTE — PROGRESS NOTES
Patient left voicemail to schedule an appointment with Dr Molina on Thursday. Patient needs a smart device to schedule video appointment. He can call scheduling to set it up.    Diane Tinsley RN   Care Coord[facility]

## 2020-04-27 NOTE — TELEPHONE ENCOUNTER
Please contact patient- he needs a lab visit then will refill rx.  Other meds already have failed janneth refills.      Ja Malhotra PA-C

## 2020-04-28 ENCOUNTER — VIRTUAL VISIT (OUTPATIENT)
Dept: FAMILY MEDICINE | Facility: CLINIC | Age: 65
End: 2020-04-28
Payer: COMMERCIAL

## 2020-04-28 VITALS — BODY MASS INDEX: 21.6 KG/M2 | WEIGHT: 140 LBS

## 2020-04-28 DIAGNOSIS — E11.69 TYPE 2 DIABETES MELLITUS WITH OTHER SPECIFIED COMPLICATION, WITHOUT LONG-TERM CURRENT USE OF INSULIN (H): ICD-10-CM

## 2020-04-28 DIAGNOSIS — R36.9 PENILE DISCHARGE: Primary | ICD-10-CM

## 2020-04-28 DIAGNOSIS — E78.5 HYPERLIPIDEMIA, UNSPECIFIED HYPERLIPIDEMIA TYPE: ICD-10-CM

## 2020-04-28 PROCEDURE — 99213 OFFICE O/P EST LOW 20 MIN: CPT | Mod: 95 | Performed by: PHYSICIAN ASSISTANT

## 2020-04-28 RX ORDER — GLIPIZIDE 5 MG/1
TABLET, FILM COATED, EXTENDED RELEASE ORAL
Qty: 90 TABLET | Refills: 0 | Status: SHIPPED | OUTPATIENT
Start: 2020-04-28

## 2020-04-28 RX ORDER — PRAVASTATIN SODIUM 40 MG
40 TABLET ORAL DAILY
Qty: 90 TABLET | Refills: 0 | Status: SHIPPED | OUTPATIENT
Start: 2020-04-28

## 2020-04-28 ASSESSMENT — PAIN SCALES - GENERAL: PAINLEVEL: EXTREME PAIN (8)

## 2020-04-28 NOTE — TELEPHONE ENCOUNTER
Refill to be addressed at time of virtual visit today with provider.  Scheduled for 3:00 pm with Ja Malhotra PA-C.    Brunilda Robb RN  [facility]

## 2020-04-28 NOTE — PROGRESS NOTES
"August Ray is a 64 year old male who is being evaluated via a billable telephone visit.      The patient has been notified of following:     \"This telephone visit will be conducted via a call between you and your physician/provider. We have found that certain health care needs can be provided without the need for a physical exam.  This service lets us provide the care you need with a short phone conversation.  If a prescription is necessary we can send it directly to your pharmacy.  If lab work is needed we can place an order for that and you can then stop by our lab to have the test done at a later time.    Telephone visits are billed at different rates depending on your insurance coverage. During this emergency period, for some insurers they may be billed the same as an in-person visit.  Please reach out to your insurance provider with any questions.    If during the course of the call the physician/provider feels a telephone visit is not appropriate, you will not be charged for this service.\"    Patient has given verbal consent for Telephone visit?  Yes    How would you like to obtain your AVS? Mail a copy    Subjective     August Ray is a 64 year old male who presents to clinic today for the following health issues:    HPI  pain in penis 2 weeks, + discharge . Pain feels like it on the inside.   No sex for 3 months.  Had a single female partner until 3 months ago.  No rashes.is circumcised.        Also due for routine DM follow up. Lab visit tomorrow as wasn't fasting today-        Reviewed and updated as needed this visit by Provider         Review of Systems   ROS COMP: CONSTITUTIONAL: NEGATIVE for fever, chills, change in weight  : positive as ab0ve  ENDOCRINE: Hx diabetes       Objective   Reported vitals:  Wt 63.5 kg (140 lb)   BMI 21.60 kg/m     healthy, alert and no distress  PSYCH: Alert and oriented times 3; coherent speech, normal   rate and volume, able to articulate logical " thoughts, able   to abstract reason, no tangential thoughts, no hallucinations   or delusions  His affect is normal  RESP: No cough, no audible wheezing, able to talk in full sentences  Remainder of exam unable to be completed due to telephone visits    Diagnostic Test Results:  none         Assessment/Plan:  1. Penile discharge     - Neisseria gonorrhoeae PCR; Future  - Chlamydia trachomatis PCR; Future  - UA reflex to Microscopic and Culture; Future    2. Type 2 diabetes mellitus with other specified complication, without long-term current use of insulin (H)  Will see labs tomorrow.  DM and cholesterol Meds refilled 90 day supplies.       Return in about 1 day (around 4/29/2020) for Lab Work.      Phone call duration:  9 minutes  LM 3:02    JESSI Latif

## 2020-04-28 NOTE — TELEPHONE ENCOUNTER
Team, please see provider message below. Please outreach to patient and schedule visit, for further refills.  Once visit is scheduled, please route encounter back to provider for refill.     Brunilda Robb RN  [facility]

## 2020-04-29 ENCOUNTER — TELEPHONE (OUTPATIENT)
Dept: FAMILY MEDICINE | Facility: CLINIC | Age: 65
End: 2020-04-29

## 2020-04-29 DIAGNOSIS — E11.69 TYPE 2 DIABETES MELLITUS WITH OTHER SPECIFIED COMPLICATION, WITHOUT LONG-TERM CURRENT USE OF INSULIN (H): ICD-10-CM

## 2020-04-29 DIAGNOSIS — R36.9 PENILE DISCHARGE: ICD-10-CM

## 2020-04-29 LAB
ALBUMIN UR-MCNC: NEGATIVE MG/DL
APPEARANCE UR: CLEAR
BILIRUB UR QL STRIP: NEGATIVE
CHOLEST SERPL-MCNC: 234 MG/DL
COLOR UR AUTO: YELLOW
CREAT UR-MCNC: 34 MG/DL
GLUCOSE UR STRIP-MCNC: NEGATIVE MG/DL
HBA1C MFR BLD: 6.6 % (ref 0–5.6)
HDLC SERPL-MCNC: 91 MG/DL
HGB UR QL STRIP: NEGATIVE
KETONES UR STRIP-MCNC: NEGATIVE MG/DL
LDLC SERPL CALC-MCNC: 91 MG/DL
LEUKOCYTE ESTERASE UR QL STRIP: NEGATIVE
MICROALBUMIN UR-MCNC: 49 MG/L
MICROALBUMIN/CREAT UR: 146.43 MG/G CR (ref 0–17)
NITRATE UR QL: NEGATIVE
NONHDLC SERPL-MCNC: 143 MG/DL
PH UR STRIP: 6 PH (ref 5–7)
SOURCE: NORMAL
SP GR UR STRIP: <=1.005 (ref 1–1.03)
TRIGL SERPL-MCNC: 259 MG/DL
UROBILINOGEN UR STRIP-ACNC: 0.2 EU/DL (ref 0.2–1)

## 2020-04-29 PROCEDURE — 82043 UR ALBUMIN QUANTITATIVE: CPT | Performed by: PHYSICIAN ASSISTANT

## 2020-04-29 PROCEDURE — 87491 CHLMYD TRACH DNA AMP PROBE: CPT | Performed by: PHYSICIAN ASSISTANT

## 2020-04-29 PROCEDURE — 87591 N.GONORRHOEAE DNA AMP PROB: CPT | Performed by: PHYSICIAN ASSISTANT

## 2020-04-29 PROCEDURE — 81003 URINALYSIS AUTO W/O SCOPE: CPT | Performed by: PHYSICIAN ASSISTANT

## 2020-04-29 PROCEDURE — 83036 HEMOGLOBIN GLYCOSYLATED A1C: CPT | Performed by: PHYSICIAN ASSISTANT

## 2020-04-29 PROCEDURE — 36415 COLL VENOUS BLD VENIPUNCTURE: CPT | Performed by: PHYSICIAN ASSISTANT

## 2020-04-29 PROCEDURE — 80061 LIPID PANEL: CPT | Performed by: PHYSICIAN ASSISTANT

## 2020-04-29 NOTE — LETTER
May 1, 2020      August Ray  [address]   [place] [address]        Dear ,    We are writing to inform you of your test results. All of your urine tests were normal. Please follow up with your urologist if your symptoms continue. Please contact the clinic if you have additional questions. Thank you.     Sincerely,  JESSI Latif/toby    Resulted Orders   Lipid Profile   Result Value Ref Range    Cholesterol 234 (H) <200 mg/dL      Comment:      Desirable:       <200 mg/dl    Triglycerides 259 (H) <150 mg/dL      Comment:      Borderline high:  150-199 mg/dl  High:             200-499 mg/dl  Very high:       >499 mg/dl  Fasting specimen      HDL Cholesterol 91 >39 mg/dL    LDL Cholesterol Calculated 91 <100 mg/dL      Comment:      Desirable:       <100 mg/dl    Non HDL Cholesterol 143 (H) <130 mg/dL      Comment:      Above Desirable:  130-159 mg/dl  Borderline high:  160-189 mg/dl  High:             190-219 mg/dl  Very high:       >219 mg/dl     Hemoglobin A1c   Result Value Ref Range    Hemoglobin A1C 6.6 (H) 0 - 5.6 %      Comment:      Normal <5.7% Prediabetes 5.7-6.4%  Diabetes 6.5% or higher - adopted from ADA   consensus guidelines.     UA reflex to Microscopic and Culture   Result Value Ref Range    Color Urine Yellow     Appearance Urine Clear     Glucose Urine Negative NEG^Negative mg/dL    Bilirubin Urine Negative NEG^Negative    Ketones Urine Negative NEG^Negative mg/dL    Specific Gravity Urine <=1.005 1.003 - 1.035    Blood Urine Negative NEG^Negative    pH Urine 6.0 5.0 - 7.0 pH    Protein Albumin Urine Negative NEG^Negative mg/dL    Urobilinogen Urine 0.2 0.2 - 1.0 EU/dL    Nitrite Urine Negative NEG^Negative    Leukocyte Esterase Urine Negative NEG^Negative    Source Midstream Urine    Chlamydia trachomatis PCR   Result Value Ref Range    Specimen Description Urine     Chlamydia Trachomatis PCR Negative NEG^Negative      Comment:      Negative for C.  trachomatis rRNA by transcription mediated amplification.  A negative result by transcription mediated amplification does not preclude   the presence of C. trachomatis infection because results are dependent on   proper and adequate collection, absence of inhibitors, and sufficient rRNA to   be detected.     Neisseria gonorrhoeae PCR   Result Value Ref Range    Specimen Descrip Urine     N Gonorrhea PCR Negative NEG^Negative      Comment:      Negative for N. gonorrhoeae rRNA by transcription mediated amplification.  A negative result by transcription mediated amplification does not preclude   the presence of N. gonorrhoeae infection because results are dependent on   proper and adequate collection, absence of inhibitors, and sufficient rRNA to   be detected.     Albumin Random Urine Quantitative with Creat Ratio   Result Value Ref Range    Creatinine Urine 34 mg/dL    Albumin Urine mg/L 49 mg/L    Albumin Urine mg/g Cr 146.43 (H) 0 - 17 mg/g Cr

## 2020-04-29 NOTE — TELEPHONE ENCOUNTER
Patient called anxious about his results- A1c is excellent, lipids worse and he admits med compliance hasnt been great recently.  UA WNL, urine protein elev but did n't discuss this in light of his symptoms and still pending STD screens.  Ja Malhotra PA-C

## 2020-04-30 LAB
C TRACH DNA SPEC QL NAA+PROBE: NEGATIVE
N GONORRHOEA DNA SPEC QL NAA+PROBE: NEGATIVE
SPECIMEN SOURCE: NORMAL
SPECIMEN SOURCE: NORMAL

## 2020-04-30 NOTE — RESULT ENCOUNTER NOTE
Please send letter if doesn't check mychart.  Ja Malhotra PA-C    Mr. Cory,    All of your urine tests were normal.  Please follow up with your urologist if your symptoms continue    Please contact the clinic if you have additional questions.  Thank you.    Sincerely,    JESSI Latif

## 2020-05-04 ENCOUNTER — TELEPHONE (OUTPATIENT)
Dept: FAMILY MEDICINE | Facility: CLINIC | Age: 65
End: 2020-05-04

## 2020-05-04 NOTE — TELEPHONE ENCOUNTER
Panel Management Review      4/29/2020  Last Office Visit with this department: 4/29/2020    Fail List measure: BP/DM      Patient is due/failing the following:   BP CHECK/medicaire annual exam    Action needed:   Patient needs curbside BP check and medicare annual exam    Type of outreach:    none    Questions for provider review:    None                                                                                                                                    JESSI Latif      Chart routed to Care Team .

## 2020-05-04 NOTE — LETTER
[address]  [address]  [phone]    May 20, 2020      August Ray  [address]   [facility] [address]      Dear August,    We see that you are due for a video wellness exam and a curbside blood pressure check.  Please call [phone] to schedule these appointments.    Thank you  [facility]

## 2020-05-08 ENCOUNTER — TELEPHONE (OUTPATIENT)
Dept: FAMILY MEDICINE | Facility: CLINIC | Age: 65
End: 2020-05-08

## 2020-05-08 NOTE — TELEPHONE ENCOUNTER
Called and spoke with patient. States that his kidneys have been itching for 3 weeks and having difficulty urinating.     Patient is stating that his urine is now sticky and is peeing all over. Now he is having to sit down on the toilet.    I broke up with my fiance in January and have not had sex since.     Adelfo states that his urine has a red tinge to it. Not blood, it is from my vitamins from [facility].     Denies any fever or chest congestion, no exposure to COVID19.       Patient was advised to be seen in UC today. Patient states that he has to work, but will go in before his shift.     No further questions or concerns at this time.    Radha Frederick RN  [facility] [place], [place]

## 2020-05-08 NOTE — TELEPHONE ENCOUNTER
Reason for Call:  Other concern    Detailed comments: Patient states that he was suppose to get a call from Ibrahima Malhotra but I do not see it anywhere in the notes. He states that his Kidney's are itching and he is having trouble urinating he would like to come in the clinic to be seen in person please follow up with patient and advise.    Phone Number Patient can be reached at: Cell number on file:    Telephone Information:   Mobile [phone]       Best Time: Any    Can we leave a detailed message on this number? YES    Call taken on 5/8/2020 at 9:40 AM by Jimmie Aguilera

## 2020-05-08 NOTE — TELEPHONE ENCOUNTER
Plan does not cover dapagliflozin (FARXIGA) 10 MG TABS tablet .  Please call [phone] to initiate Prior Auth or change med.    Insurance type and ID number: ID# [id number]      Additional Information:     Bryanna Bravo

## 2020-05-10 ENCOUNTER — NURSE TRIAGE (OUTPATIENT)
Dept: NURSING | Facility: CLINIC | Age: 65
End: 2020-05-10

## 2020-05-10 ENCOUNTER — OFFICE VISIT (OUTPATIENT)
Dept: URGENT CARE | Facility: URGENT CARE | Age: 65
End: 2020-05-10
Payer: COMMERCIAL

## 2020-05-10 VITALS
WEIGHT: 135.38 LBS | SYSTOLIC BLOOD PRESSURE: 161 MMHG | OXYGEN SATURATION: 99 % | HEART RATE: 125 BPM | DIASTOLIC BLOOD PRESSURE: 77 MMHG | BODY MASS INDEX: 20.89 KG/M2 | TEMPERATURE: 97.8 F | RESPIRATION RATE: 16 BRPM

## 2020-05-10 DIAGNOSIS — R36.9 PENILE DISCHARGE: ICD-10-CM

## 2020-05-10 DIAGNOSIS — R30.0 DYSURIA: ICD-10-CM

## 2020-05-10 DIAGNOSIS — R82.90 NONSPECIFIC FINDING ON EXAMINATION OF URINE: ICD-10-CM

## 2020-05-10 DIAGNOSIS — N30.00 ACUTE CYSTITIS WITHOUT HEMATURIA: Primary | ICD-10-CM

## 2020-05-10 DIAGNOSIS — E11.9 TYPE 2 DIABETES MELLITUS WITHOUT COMPLICATION, UNSPECIFIED WHETHER LONG TERM INSULIN USE (H): ICD-10-CM

## 2020-05-10 LAB
ALBUMIN UR-MCNC: NEGATIVE MG/DL
APPEARANCE UR: CLEAR
BACTERIA #/AREA URNS HPF: ABNORMAL /HPF
BILIRUB UR QL STRIP: NEGATIVE
COLOR UR AUTO: YELLOW
GLUCOSE UR STRIP-MCNC: NEGATIVE MG/DL
HGB UR QL STRIP: NEGATIVE
KETONES UR STRIP-MCNC: NEGATIVE MG/DL
LEUKOCYTE ESTERASE UR QL STRIP: ABNORMAL
NITRATE UR QL: NEGATIVE
PH UR STRIP: 6.5 PH (ref 5–7)
RBC #/AREA URNS AUTO: ABNORMAL /HPF
SOURCE: ABNORMAL
SP GR UR STRIP: 1.01 (ref 1–1.03)
UROBILINOGEN UR STRIP-ACNC: 0.2 EU/DL (ref 0.2–1)
WBC #/AREA URNS AUTO: ABNORMAL /HPF

## 2020-05-10 PROCEDURE — 84153 ASSAY OF PSA TOTAL: CPT | Performed by: PHYSICIAN ASSISTANT

## 2020-05-10 PROCEDURE — 87086 URINE CULTURE/COLONY COUNT: CPT | Performed by: PHYSICIAN ASSISTANT

## 2020-05-10 PROCEDURE — 36415 COLL VENOUS BLD VENIPUNCTURE: CPT | Performed by: PHYSICIAN ASSISTANT

## 2020-05-10 PROCEDURE — 81001 URINALYSIS AUTO W/SCOPE: CPT | Performed by: PHYSICIAN ASSISTANT

## 2020-05-10 PROCEDURE — 86780 TREPONEMA PALLIDUM: CPT | Performed by: PHYSICIAN ASSISTANT

## 2020-05-10 PROCEDURE — 87529 HSV DNA AMP PROBE: CPT | Performed by: PHYSICIAN ASSISTANT

## 2020-05-10 PROCEDURE — 99214 OFFICE O/P EST MOD 30 MIN: CPT | Performed by: PHYSICIAN ASSISTANT

## 2020-05-10 PROCEDURE — 87389 HIV-1 AG W/HIV-1&-2 AB AG IA: CPT | Performed by: PHYSICIAN ASSISTANT

## 2020-05-10 RX ORDER — SULFAMETHOXAZOLE/TRIMETHOPRIM 800-160 MG
1 TABLET ORAL 2 TIMES DAILY
Qty: 20 TABLET | Refills: 0 | Status: SHIPPED | OUTPATIENT
Start: 2020-05-10 | End: 2020-05-20

## 2020-05-10 NOTE — LETTER
May 11, 2020    August Ray  [address]   [place] [facility] [address]      Dear ,    The results of your recent lab tests were within normal limits. Enclosed is a copy of these results.  If you have any further questions or problems, please contact our office at [phone].      Sincerely,        Dorothy Figueroa PA-C    Resulted Orders   *UA reflex to Microscopic and Culture ([place] and [facility] (except [place] and [place])   Result Value Ref Range    Color Urine Yellow     Appearance Urine Clear     Glucose Urine Negative NEG^Negative mg/dL    Bilirubin Urine Negative NEG^Negative    Ketones Urine Negative NEG^Negative mg/dL    Specific Gravity Urine 1.015 1.003 - 1.035    Blood Urine Negative NEG^Negative    pH Urine 6.5 5.0 - 7.0 pH    Protein Albumin Urine Negative NEG^Negative mg/dL    Urobilinogen Urine 0.2 0.2 - 1.0 EU/dL    Nitrite Urine Negative NEG^Negative    Leukocyte Esterase Urine Small (A) NEG^Negative    Source Midstream Urine    PSA, tumor marker   Result Value Ref Range    PSA 0.02 0 - 4 ug/L      Comment:      Assay Method:  Chemiluminescence using Siemens Vista analyzer   Treponema Abs w Reflex to RPR and Titer   Result Value Ref Range    Treponema Antibodies Nonreactive NR^Nonreactive      Comment:      Methodology Change: Test performed on the LED Light Sense Liaison XL by Treponema   pallidum Total Antibodies Assay as of 3.17.2020.     Urine Microscopic   Result Value Ref Range    WBC Urine 10-25 (A) OTO5^0 - 5 /HPF    RBC Urine O - 2 OTO2^O - 2 /HPF    Bacteria Urine Moderate (A) NEG^Negative /HPF   Urine Culture Aerobic Bacterial   Result Value Ref Range    Specimen Description Midstream Urine     Culture Micro No growth

## 2020-05-10 NOTE — TELEPHONE ENCOUNTER
For the past 2-3 weeks has been having trouble with his urine. He has to sit down when urinates as it goes all over. Urine is different too. Just like when he was younger and dx with gonorrhea. His recent testing for gonorrhea was negative but it is still happening. On 5/8/20 he spoke with a clinic nurse who said for him to go to the urgent care which he has not done yet because of work. He doesn't work till later today, so he will go to the [facility] today at 9am.     April Dey RN  [facility]  Triage Nurse Advisors    Reason for Disposition    [1] Can't control passage of urine (i.e., urinary incontinence) AND [2] new onset (< 2 weeks) or worsening    Additional Information    Negative: Shock suspected (e.g., cold/pale/clammy skin, too weak to stand, low BP, rapid pulse)    Negative: Sounds like a life-threatening emergency to the triager    Negative: Followed a genital area injury    Negative: Followed a genital area injury (penis, scrotum)    Negative: Vaginal discharge    Negative: Pus (white, yellow) or bloody discharge from end of penis    Negative: [1] Taking antibiotic for urinary tract infection (UTI) AND [2] female    Negative: [1] Taking antibiotic for urinary tract infection (UTI) AND [2] male    Negative: [1] Discomfort (pain, burning or stinging) when passing urine AND [2] pregnant    Negative: [1] Discomfort (pain, burning or stinging) when passing urine AND [2] postpartum < 1 month    Negative: [1] Discomfort (pain, burning or stinging) when passing urine AND [2] female    Negative: [1] Discomfort (pain, burning or stinging) when passing urine AND [2] male    Negative: Pain or itching in the vulvar area    Negative: Pain in scrotum is main symptom    Negative: Blood in the urine is main symptom    Negative: Symptoms arising from use of a urinary catheter (Pur[place] or [place])    Negative: [1] Unable to urinate (or only a few drops) > 4 hours AND     [2] bladder feels very full (e.g., palpable bladder  or strong urge to urinate)    Negative: [1] Decreased urination and [2] drinking very little AND [2] dehydration suspected (e.g., dark urine, no urine > 12 hours, very dry mouth, very lightheaded)    Negative: Patient sounds very sick or weak to the triager    Negative: Fever > 100.5 F (38.1 C)    Negative: Side (flank) or lower back pain present    Protocols used: URINARY SYMPTOMS-A-AH

## 2020-05-10 NOTE — PROGRESS NOTES
S: 64-year-old male presents for evaluation dysuria for 2 to 3 weeks.  He states the tip of his penis also itches.  He denies any lesions or sores.  He is status post prostate cancer.  At times he reports urgency and frequency and other times reports hesitancy.  He states that last week he had some pus drainage.  He had this in the past when he had gonorrhea many many years ago.  He had a recent UA urine GC and chlamydia on April 29, 2020 that were negative.  He is diabetic.  He denies any headache, dizziness, fever, nausea, vomiting, abdominal pain or flank pain.  No low back pain.  His urine seems sticky and sprays all over.  He broke up with his fiancée in January and has not had sex since then. BG yesterday AM was 96        Allergies   Allergen Reactions     Hydrocodone-Acetaminophen      syncope     Insulin Glargine      Itchy rash     No Clinical Screening - See Comments      Intolerance to this     Oxycodone      States he passed out/fell after taking it       Past Medical History:   Diagnosis Date     Diabetes type 2, controlled (H)      Gout      HTN (hypertension)      Hyperlipidemia        Acetaminophen (TYLENOL PO), Take  by mouth as needed.  aspirin 81 MG tablet, Take 1 tablet by mouth daily.  ASSURE COMFORT LANCETS 30G MISC,   blood glucose (NO BRAND SPECIFIED) lancets standard, Use to test blood sugar 1-4 times daily or as directed.  blood glucose (NO BRAND SPECIFIED) test strip, Use to test blood sugars 1-4 times daily or as directed. Per insurance  Blood Glucose Calibration (CARESENS CONTROL A) SOLN,   blood glucose monitoring (NO BRAND SPECIFIED) meter device kit, Use to test blood sugar 1-4 times daily or as directed. Per insurance: One touch  Blood Glucose Monitoring Suppl (CARESENS N GLUCOSE SYSTEM) MARIAELENA,   glipiZIDE (GLUCOTROL XL) 5 MG 24 hr tablet, TK 1 T PO D IN THE MORNING WITH BREAKFAST.  lisinopril-hydrochlorothiazide (PRINZIDE/ZESTORETIC) 20-12.5 MG tablet, Take 1 tablet by mouth  daily  Loratadine (CLARITIN PO), Take  by mouth as needed.  metFORMIN (GLUCOPHAGE) 1000 MG tablet, Take 1 tablet (1,000 mg) by mouth 2 times daily (with meals)  Multiple Vitamins-Minerals (ROGERS MULTI MEN PO), Take 1 tablet by mouth daily.  pravastatin (PRAVACHOL) 40 MG tablet, Take 1 tablet (40 mg) by mouth daily  sildenafil (VIAGRA) 100 MG tablet, Take 1 tablet (100 mg) by mouth daily as needed (take 1 hour before intercourse)  tamsulosin (FLOMAX) 0.4 MG capsule, Take 1 capsule (0.4 mg) by mouth daily  albuterol (PROAIR HFA/PROVENTIL HFA/VENTOLIN HFA) 108 (90 Base) MCG/ACT inhaler, Inhale 1-2 puffs into the lungs every 6 hours as needed for shortness of breath / dyspnea or wheezing (Patient not taking: Reported on 5/10/2020)  benzonatate (TESSALON) 200 MG capsule, TAKE 1 CAPSULE(200 MG) BY MOUTH THREE TIMES DAILY AS NEEDED FOR COUGH (Patient not taking: Reported on 5/10/2020)  dapagliflozin (FARXIGA) 10 MG TABS tablet, Take 1 tablet (10 mg) by mouth daily Please schedule an appt for further refills.  indomethacin (INDOCIN) 50 MG capsule,   niacin ER (NIASPAN) 500 MG CR tablet, Take 1 tablet by mouth daily   predniSONE (DELTASONE) 10 MG tablet, 3 tabs PO QD x 2 days then 2 tabs PO QD x 2 days then 1 tab PO QD x 2 days (Patient not taking: Reported on 5/10/2020)  tiZANidine (ZANAFLEX) 2 MG tablet, Take 1 tablet (2 mg) by mouth 3 times daily as needed for muscle spasms (Patient not taking: Reported on 5/10/2020)    sodium chloride (PF) 0.9% PF flush 20 mL        Social History     Tobacco Use     Smoking status: Former Smoker     Packs/day: 0.00     Last attempt to quit: 1991     Years since quittin.4     Smokeless tobacco: Never Used   Substance Use Topics     Alcohol use: Yes     Drug use: Never       ROS:  General: negative for fever  ABD: Denies abd pain  : as above    OBJECTIVE:  BP (!) 161/77 (BP Location: Left arm, Patient Position: Chair, Cuff Size: Adult Regular)   Pulse 125   Temp 97.8  F  (36.6  C) (Tympanic)   Resp 16   Wt 61.4 kg (135 lb 6 oz)   SpO2 99%   BMI 20.89 kg/m     General:   awake, alert, and cooperative.  NAD.   Head: Normocephalic, atraumatic.  Eyes: Conjunctiva clear, non icteric.   ABD: soft, no tenderness to palpation , no rigidity, guarding or rebound . No CVAT  Neuro: Alert and oriented - normal speech.   Orders placed or performed in visit on 05/10/20     sulfamethoxazole-trimethoprim (BACTRIM DS) 800-160 MG tablet       Creatinine 0.96, GFR 83 in 8/9/2019    Results for orders placed or performed in visit on 05/10/20   *UA reflex to Microscopic and Culture ([place] and [facility] (except [place] and [place])     Status: Abnormal    Specimen: Midstream Urine   Result Value Ref Range    Color Urine Yellow     Appearance Urine Clear     Glucose Urine Negative NEG^Negative mg/dL    Bilirubin Urine Negative NEG^Negative    Ketones Urine Negative NEG^Negative mg/dL    Specific Gravity Urine 1.015 1.003 - 1.035    Blood Urine Negative NEG^Negative    pH Urine 6.5 5.0 - 7.0 pH    Protein Albumin Urine Negative NEG^Negative mg/dL    Urobilinogen Urine 0.2 0.2 - 1.0 EU/dL    Nitrite Urine Negative NEG^Negative    Leukocyte Esterase Urine Small (A) NEG^Negative    Source Midstream Urine    Urine Microscopic     Status: Abnormal   Result Value Ref Range    WBC Urine 10-25 (A) OTO5^0 - 5 /HPF    RBC Urine O - 2 OTO2^O - 2 /HPF    Bacteria Urine Moderate (A) NEG^Negative /HPF     Neg GC/chlamydia 4/29/2020.     ASSESSMENT:       ICD-10-CM    1. Acute cystitis without hematuria  N30.00 UROLOGY ADULT REFERRAL     sulfamethoxazole-trimethoprim (BACTRIM DS) 800-160 MG tablet   2. Dysuria  R30.0 *UA reflex to Microscopic and Culture ([place] and [facility] (except [place] and [place])     PSA, tumor marker     Treponema Abs w Reflex to RPR and Titer     HIV Antigen Antibody Combo     HSV 1 and 2 DNA by PCR     UROLOGY ADULT REFERRAL     Urine Microscopic      sulfamethoxazole-trimethoprim (BACTRIM DS) 800-160 MG tablet   3. Penile discharge  R36.9    4. Nonspecific finding on examination of urine  R82.90 Urine Culture Aerobic Bacterial   5. Type 2 diabetes mellitus without complication, unspecified whether long term insulin use (H)  E11.9                PLAN: UC pending. Used Bactrim as Cipro can lower BG. F/U with urology if no resolution.  As per ordered above.  Drink plenty of fluids.  Prevention and treatment of UTI's discussed. Follow up with primary care physician if not improving.  Advised about symptoms which might herald more serious problems.      Dorothy Figueroa PA-C

## 2020-05-11 DIAGNOSIS — R68.82 DECREASED LIBIDO: ICD-10-CM

## 2020-05-11 DIAGNOSIS — C61 PROSTATE CANCER (H): ICD-10-CM

## 2020-05-11 DIAGNOSIS — N52.9 ERECTILE DYSFUNCTION, UNSPECIFIED ERECTILE DYSFUNCTION TYPE: ICD-10-CM

## 2020-05-11 LAB
BACTERIA SPEC CULT: NO GROWTH
HIV 1+2 AB+HIV1 P24 AG SERPL QL IA: NONREACTIVE
HSV1 DNA SPEC QL NAA+PROBE: NEGATIVE
HSV2 DNA SPEC QL NAA+PROBE: NEGATIVE
PSA SERPL-MCNC: 0.02 UG/L (ref 0–4)
SPECIMEN SOURCE: NORMAL
SPECIMEN SOURCE: NORMAL
T PALLIDUM AB SER QL: NONREACTIVE

## 2020-05-11 RX ORDER — SILDENAFIL 100 MG/1
100 TABLET, FILM COATED ORAL DAILY PRN
Qty: 100 TABLET | Refills: 0 | Status: CANCELLED | OUTPATIENT
Start: 2020-05-11

## 2020-05-11 NOTE — TELEPHONE ENCOUNTER
Prior Authorization Approval    Authorization Effective Date: 5/11/2020  Authorization Expiration Date: 5/11/2021  Medication: dapagliflozin (FARXIGA) 10 MG-APPROVED  Approved Dose/Quantity:    Reference #:     Insurance Company: [employer] - Phone [phone] Fax [phone]  Expected CoPay:       CoPay Card Available:      Foundation Assistance Needed:    Which Pharmacy is filling the prescription (Not needed for infusion/clinic administered): [id number] [address] AT [facility]  Pharmacy Notified: Yes  Patient Notified: Yes  **Instructed pharmacy to notify patient when script is ready to /ship.**

## 2020-05-11 NOTE — TELEPHONE ENCOUNTER
Central Prior Authorization Team   Phone: [phone]    PA Initiation    Medication: dapagliflozin (FARXIGA) 10 MG TABS tablet   Insurance Company: [employer] - Phone [phone] Fax [phone]  Pharmacy Filling the Rx: [facility][id number] [address] [place] AT [address] [facility] [place]  Filling Pharmacy Phone: [phone]  Filling Pharmacy Fax: [phone]  Start Date: 5/11/2020

## 2020-05-18 NOTE — TELEPHONE ENCOUNTER
This writer attempted to contact pt on 05/18/20      Reason for call due for curbside BP check and virtual visit for medicare annual exam  and left message.      If patient calls back:   Schedule virtual visit for medicare annual exam and curbside ancillary for BP check within 1 week with primary care and ancillary, document that pt called and close encounter         Sandra Adhikari MA

## 2020-05-26 DIAGNOSIS — C61 PROSTATE CANCER (H): ICD-10-CM

## 2020-05-26 NOTE — LETTER
May 29, 2020      August Ray  [address]   [facility] [address]        Dear ,    At [facility] we care about your health and are committed to providing quality patient care. Regular appointments are a vital part of the care and management of your health and can help prevent many of the complications that can occur.       It has come to our attention that you have been given a 30 day refill  of tamsulosin and that you are due for a blood pressure check and virtual medicare visit.  Please call [facility] at [phone]  to schedule your appointment.       Sincerely,   [facility] Care Team

## 2020-05-28 DIAGNOSIS — E11.69 TYPE 2 DIABETES MELLITUS WITH OTHER SPECIFIED COMPLICATION, WITHOUT LONG-TERM CURRENT USE OF INSULIN (H): ICD-10-CM

## 2020-05-28 RX ORDER — TAMSULOSIN HYDROCHLORIDE 0.4 MG/1
0.4 CAPSULE ORAL DAILY
Qty: 30 CAPSULE | Refills: 0 | Status: SHIPPED | OUTPATIENT
Start: 2020-05-28 | End: 2020-06-27

## 2020-05-28 NOTE — TELEPHONE ENCOUNTER
Routing refill request to provider for review/approval because:    Alpha Blockers Failed for the following did not meet parameters for [id number]/26/2020 10:51 AM   Blood pressure under 140/90 in past 12 months Protocol Details    Patient does not have Tadalafil, Vardenafil, or Sildenafil on their medication list        Graciela Wilson RN, [facility] Triage

## 2020-05-28 NOTE — TELEPHONE ENCOUNTER
One month approved. Patient still needs to sched curbside BP check and a virtual medicare visit  Ja Malhotra PA-C

## 2020-06-01 RX ORDER — DAPAGLIFLOZIN 10 MG/1
10 TABLET, FILM COATED ORAL DAILY
Qty: 90 TABLET | Refills: 0 | Status: SHIPPED | OUTPATIENT
Start: 2020-06-01 | End: 2020-06-16

## 2020-06-01 NOTE — TELEPHONE ENCOUNTER
Prescription approved per [facility] Refill Protocol.  Emi Major RN  [facility]

## 2020-06-30 ENCOUNTER — TELEPHONE (OUTPATIENT)
Dept: FAMILY MEDICINE | Facility: CLINIC | Age: 65
End: 2020-06-30

## 2020-06-30 NOTE — TELEPHONE ENCOUNTER
Reason for Call:  Other Pt Information    Detailed comments: Pt calling regarding obtaining medical information for Pt has recently      Phone Number Patient can be reached at: Other phone number:  [phone]    Best Time: anytime    Can we leave a detailed message on this number? YES    Call taken on 2020 at 10:32 AM by Harsha Champagne

## 2020-06-30 NOTE — TELEPHONE ENCOUNTER
Request should go to medical records.  Please note in chart that patient has .  Ja Malhotra PA-C

## 2020-07-01 NOTE — TELEPHONE ENCOUNTER
[facility] office called to speak with staff regarding patient and his recent passing. They wanted information from chart, regarding list of health history/problem list diagnosis and list of current medications.    Writer spoke with caller, Megan. All requested information was given.    Please note in chart that patient is  and update medical records.    Brunilda Robb RN  [facility]

## 2020-07-01 NOTE — TELEPHONE ENCOUNTER
Reason for Call:  Other     Detailed comments: Megan from the [facility] medical examiner office is calling to obtain pt info about the  patient.Kaye was transferred to RN.    Phone Number Patient can be reached at: Other phone number:  [phone]    Best Time: Any    Can we leave a detailed message on this number? YES    Call taken on 2020 at 2:57 PM by Suzie Hill

## 2020-09-11 DIAGNOSIS — E11.69 TYPE 2 DIABETES MELLITUS WITH OTHER SPECIFIED COMPLICATION, WITHOUT LONG-TERM CURRENT USE OF INSULIN (H): ICD-10-CM

## 2020-09-11 DIAGNOSIS — I10 ESSENTIAL HYPERTENSION: ICD-10-CM

## 2020-09-11 RX ORDER — LISINOPRIL AND HYDROCHLOROTHIAZIDE 12.5; 2 MG/1; MG/1
1 TABLET ORAL DAILY
Qty: 90 TABLET | Refills: 1 | OUTPATIENT
Start: 2020-09-11

## 2020-09-11 RX ORDER — GLIPIZIDE 5 MG/1
TABLET, FILM COATED, EXTENDED RELEASE ORAL
Qty: 90 TABLET | Refills: 0 | OUTPATIENT
Start: 2020-09-11

## 2021-02-18 DIAGNOSIS — C61 PROSTATE CANCER (H): ICD-10-CM

## 2021-02-18 DIAGNOSIS — E78.5 HYPERLIPIDEMIA, UNSPECIFIED HYPERLIPIDEMIA TYPE: ICD-10-CM

## 2021-02-18 DIAGNOSIS — E11.69 TYPE 2 DIABETES MELLITUS WITH OTHER SPECIFIED COMPLICATION, WITHOUT LONG-TERM CURRENT USE OF INSULIN (H): ICD-10-CM

## 2021-02-18 RX ORDER — TAMSULOSIN HYDROCHLORIDE 0.4 MG/1
CAPSULE ORAL
Qty: 90 CAPSULE | Refills: 0 | OUTPATIENT
Start: 2021-02-18

## 2021-02-18 RX ORDER — PRAVASTATIN SODIUM 40 MG
TABLET ORAL
Qty: 90 TABLET | Refills: 0 | OUTPATIENT
Start: 2021-02-18

## 2021-08-28 NOTE — LETTER
[address]  [address]  [phone]    November 8, 2019      August Ray  [address]   [facility] [address]      Dear August,    We have refilled your Glipizide.   Please schedule a fasting lab appointment, at your earliest convenience.  You call call [phone] to schedule this appointment.      Thank you,    [facility]               Green (Altered Mental Status/Behavior)/Red S (Sepsis)